# Patient Record
Sex: FEMALE | Race: WHITE | Employment: OTHER | URBAN - METROPOLITAN AREA
[De-identification: names, ages, dates, MRNs, and addresses within clinical notes are randomized per-mention and may not be internally consistent; named-entity substitution may affect disease eponyms.]

---

## 2018-07-05 ENCOUNTER — HOSPITAL ENCOUNTER (OUTPATIENT)
Dept: PHYSICAL THERAPY | Age: 73
Discharge: HOME | End: 2018-07-05
Payer: MEDICARE

## 2018-07-05 ENCOUNTER — HOSPITAL ENCOUNTER (OUTPATIENT)
Dept: BONE DENSITY | Age: 73
Discharge: HOME | End: 2018-07-05
Attending: FAMILY MEDICINE
Payer: MEDICARE

## 2018-07-05 DIAGNOSIS — M85.80 OSTEOPENIA: ICD-10-CM

## 2018-07-05 DIAGNOSIS — M81.0 OSTEOPOROSIS: ICD-10-CM

## 2018-07-05 PROCEDURE — 77080 DXA BONE DENSITY AXIAL: CPT

## 2018-07-05 PROCEDURE — G8979 MOBILITY GOAL STATUS: HCPCS

## 2018-07-05 PROCEDURE — 97162 PT EVAL MOD COMPLEX 30 MIN: CPT

## 2018-07-05 PROCEDURE — G8978 MOBILITY CURRENT STATUS: HCPCS

## 2018-07-05 PROCEDURE — 97110 THERAPEUTIC EXERCISES: CPT

## 2018-07-05 NOTE — PROGRESS NOTES
PT DAILY TREATMENT NOTE - [facility] 3-16    Patient Name: Emmett Gómez  Date:2018  : 1945  [x]  Patient  Verified  Payor: [place] MEDICARE / Plan: [place] MEDICARE PART A & B / Product Type: Medicare /    In time:4:00  Out time:4:40  Total Treatment Time (min): 40  Total Timed Codes (min): 10  1:1 Treatment Time ( [address] only): 40   Visit #: 1 of 10    Treatment Area: Pain in right hip [M25.551]    SUBJECTIVE  Pain Level (0-10 scale): 0/10  Any medication changes, allergies to medications, adverse drug reactions, diagnosis change, or new procedure performed?: [x] No    [] Yes (see summary sheet for update)  Subjective functional status/changes:   [x] See paper initial evaluation form in patient chart      OBJECTIVE    30 min [x]Eval                  []Re-Eval     10 min Therapeutic Exercise:  [] See flow sheet : HEP given and reviewed with Pt   Rationale: increase ROM to improve the patients ability to perform ADLs, resume exercise regimen          With   [x] TE   [] TA   [] neuro   [] other: Patient Education: [x] Review HEP    [] Progressed/Changed HEP based on:   [] positioning   [] body mechanics   [] transfers   [] heat/ice application    [] other:      Other Objective/Functional Measures: FOTO 53 pts     Pain Level (0-10 scale) post treatment: 0/10    ASSESSMENT/Changes in Function:     Patient will continue to benefit from skilled PT services to address functional mobility deficits, address ROM deficits, analyze and address soft tissue restrictions, analyze and cue movement patterns, analyze and modify body mechanics/ergonomics and instruct in home and community integration to attain remaining goals.      [x]  See Plan of Care         PLAN  []  Upgrade activities as tolerated     [x]  Continue plan of care  []  Update interventions per flow sheet       []  Discharge due to:_  []  Other:_      Sosa Andre, PT 2018  5:01 PM

## 2018-07-05 NOTE — PROGRESS NOTES
[facility] - [facility], [address] [place] [address]  [phone] fax  Plan of Care/ Statement of Necessity for Physical Therapy Services    Patient name: Clara Talavera Start of Care: 2018   Referral source: Michelle Barriga MD : 1945    Medical Diagnosis: Pain in right hip [M25.551]   Onset Date:3/2018    Treatment Diagnosis: Right Hip Pain   Prior Hospitalization: see medical history Provider#: [id number]   Medications: Verified on Patient summary List    Comorbidities: Arthritis; Asthma; Neck pain; hx of ulcers; Headaches   Prior Level of Function: Lives in high [facility]; functionally independent; exercising 3x/wk at [address] and following information is based on the information from the initial evaluation. Assessment/ key information: Pt is a 67 y.o. female who presents with c/o gradual onset of right glute cramping and intermittent tenderness that persisted and now radiates down into right thigh and calf. Pt uncertain if from low impact exercises Pt began at beginning of the year after joining [employer]. Pt now reports intermittent burning, hot pain and cramping/aching. Pt compliant with stretches and use of cold pack that assists with pain but only temporarily. Pt reports decreased standing/amb tolerance, difficulty with stairs when visiting son's home, cramping at night, disrupting sleep, and inability to exercise regularly now. Upon exam, Pt exhibited point tenderness to right glute medius, piriformis, quad that reproduced Pt's symptoms; and tightness in same muscle groups. Pt would benefit from skilled PT to address above deficits to improve Pt's function and ability to return to prior functional level without difficulty or pain.     Evaluation Complexity History MEDIUM  Complexity : 1-2 comorbidities / personal factors will impact the outcome/ POC ; Examination LOW Complexity : 1-2 Standardized tests and measures addressing body structure, function, activity limitation and / or participation in recreation  ;Presentation MEDIUM Complexity : Evolving with changing characteristics  ; Clinical Decision Making MEDIUM Complexity : FOTO score of 26-74  Overall Complexity Rating: MEDIUM  Problem List: pain affecting function, decrease ROM, impaired gait/ balance, decrease ADL/ functional abilitiies, decrease activity tolerance and decrease flexibility/ joint mobility    Treatment Plan may include any combination of the following: Therapeutic exercise, Therapeutic activities, Neuromuscular re-education, Physical agent/modality, Manual therapy and Patient education  Patient / Family readiness to learn indicated by: asking questions, trying to perform skills and interest  Persons(s) to be included in education: patient (P)  Barriers to Learning/Limitations: None  Patient Goal (s): No pain.   Patient Self Reported Health Status: good  Rehabilitation Potential: good    Short Term Goals: To be accomplished in 1 weeks:  Goal: Pt to be compliant with initial HEP to improve flexibility and reduce cramping. Status at last note/certification: Established and reviewed with Pt  Long Term Goals: To be accomplished in 5 weeks:  Goal: Pt to report no more than 1-2 episodes of cramping per week to be able to sleep throughout the night. Status at last note/certification: cramping daily, multiple times   Goal: Pt to report at least 50% improvement in right hip symptoms to return to exercise regimen. Status at last note/certification: N/A  Goal: Pt to report FOTO score of 63 pts to show improved function. Status at last note/certification: FOTO 53 pts    Frequency / Duration: Patient to be seen 2 times per week for 5 weeks.     Patient/ Caregiver education and instruction: Diagnosis, prognosis, exercises   [x]  Plan of care has been reviewed with PTA    G-Codes (GP)  Mobility   Current  CK= 40-59%   Goal CJ= 20-39%    The severity rating is based on clinical judgment and the FOTO score. Certification Period: 7/5/18 - 8/3/18  Sonali Andre, PT 7/5/2018 5:02 PM  _____________________________________________________________________  I certify that the above Therapy Services are being furnished while the patient is under my care. I agree with the treatment plan and certify that this therapy is necessary.     Physician's Signature:____________________  Date:__________Time:______    Please sign and return to [facility] - [address]  [phone] fax

## 2018-07-10 ENCOUNTER — HOSPITAL ENCOUNTER (OUTPATIENT)
Dept: PHYSICAL THERAPY | Age: 73
Discharge: HOME | End: 2018-07-10
Payer: MEDICARE

## 2018-07-10 PROCEDURE — 97014 ELECTRIC STIMULATION THERAPY: CPT

## 2018-07-10 PROCEDURE — 97110 THERAPEUTIC EXERCISES: CPT

## 2018-07-10 PROCEDURE — 97112 NEUROMUSCULAR REEDUCATION: CPT

## 2018-07-10 PROCEDURE — 97140 MANUAL THERAPY 1/> REGIONS: CPT

## 2018-07-10 NOTE — PROGRESS NOTES
PT DAILY TREATMENT NOTE - [facility]     Patient Name: Liliya Reading  Date:7/10/2018  : 1945  [x]  Patient  Verified  Payor: VA MEDICARE / Plan: VA MEDICARE PART A & B / Product Type: Medicare /    In time:8:30  Out time:9:20  Total Treatment Time (min): 50  Total Timed Codes (min): 40  1:1 Treatment Time ( [address] only): 40   Visit #: 2 of 10    Treatment Area: Pain in right hip [M25.551]    SUBJECTIVE  Pain Level (0-10 scale): 3-4/10  Any medication changes, allergies to medications, adverse drug reactions, diagnosis change, or new procedure performed?: [x] No    [] Yes (see summary sheet for update)  Subjective functional status/changes:   [] No changes reported  Pt reports compliance with HEP. OBJECTIVE    Modality rationale: decrease pain to improve the patients ability to perform ADls with less difficulty. Min Type Additional Details   10 [x] Estim:  [x]Unatt       [x]IFC  []Premod                        []Other:  []w/ice   []w/heat  Position:left S/L  Location:right glutes    [] Estim: []Att    []TENS instruct  []NMES                    []Other:  []w/US   []w/ice   []w/heat  Position:  Location:    []  Traction: [] Cervical       []Lumbar                       [] Prone          []Supine                       []Intermittent   []Continuous Lbs:  [] before manual  [] after manual    []  Ultrasound: []Continuous   [] Pulsed                           []1MHz   []3MHz W/cm2:  Location:    []  Iontophoresis with dexamethasone         Location: [] Take home patch   [] In clinic    []  Ice     []  heat  []  Ice massage  []  Laser   []  Anodyne Position:  Location:    []  Laser with stim  []  Other:  Position:  Location:    []  Vasopneumatic Device Pressure:       [] lo [] med [] hi   Temperature: [] lo [] med [] hi   [x] Skin assessment post-treatment:  [x]intact [x]redness- no adverse reaction    []redness - adverse reaction:        30 min Neuromuscular Re-education:  []  See flow sheet :pilates ex. Dynamic warm up. Rationale: increase ROM, increase strength, improve coordination and improve balance  to improve the patients ability to perform ADls with less difficulty. 10 min Manual Therapy:  TPR, STM to right piriformis, ITB and quads in left S/L. Rationale: decrease pain, increase ROM, increase tissue extensibility and decrease trigger points to perform ADLs with less difficulty. With   [] TE   [] TA   [] neuro   [] other: Patient Education: [x] Review HEP    [] Progressed/Changed HEP based on:   [] positioning   [] body mechanics   [] transfers   [] heat/ice application    [] other:      Other Objective/Functional Measures:      Pain Level (0-10 scale) post treatment: 3/10    ASSESSMENT/Changes in Function: Initiated ex. Per flow sheet. Pt reported increased pain with pilate's ex. Feet in straps and declined circles. Will progress next visit per pt. Tolerance. Pt reported decreased pain and muscle tension after manual and e-stim. Patient will continue to benefit from skilled PT services to modify and progress therapeutic interventions, address functional mobility deficits, address ROM deficits, address strength deficits, analyze and address soft tissue restrictions and analyze and cue movement patterns to attain remaining goals. []  See Plan of Care  []  See progress note/recertification  []  See Discharge Summary         Progress towards goals / Updated goals:  Short Term Goals: To be accomplished in 1 weeks:  Goal: Pt to be compliant with initial HEP to improve flexibility and reduce cramping. Status at last note/certification: Established and reviewed with Pt  Current: MET. Pt reports compliance. Long Term Goals: To be accomplished in 5 weeks:  Goal: Pt to report no more than 1-2 episodes of cramping per week to be able to sleep throughout the night.   Status at last note/certification: cramping daily, multiple times   Goal: Pt to report at least 50% improvement in right hip symptoms to return to exercise regimen. Status at last note/certification: N/A  Goal: Pt to report FOTO score of 63 pts to show improved function.   Status at last note/certification: FOTO 53 pts    PLAN  [x]  Upgrade activities as tolerated     [x]  Continue plan of care  []  Update interventions per flow sheet       []  Discharge due to:_  []  Other:_      Suzanne Cunningham PTA 7/10/2018  8:28 AM    Future Appointments  Date Time Provider Diana Burton   7/10/2018 8:30 AM Suzanne Cunningham PTA [facility]   7/12/2018 8:30 AM Carletha Garrison [facility]   7/17/2018 9:30  [address] [facility]   7/19/2018 9:30 AM Pal Perez, PT [facility]   7/24/2018 9:30 AM Pal Perez, PT [facility]   7/26/2018 10:30 AM Carletha Garrison [facility]   7/31/2018 9:30 AM Pal Perez, PT Jagruti Fernandez

## 2018-07-12 ENCOUNTER — HOSPITAL ENCOUNTER (OUTPATIENT)
Dept: PHYSICAL THERAPY | Age: 73
Discharge: HOME | End: 2018-07-12
Payer: MEDICARE

## 2018-07-12 PROCEDURE — 97112 NEUROMUSCULAR REEDUCATION: CPT

## 2018-07-12 PROCEDURE — 97014 ELECTRIC STIMULATION THERAPY: CPT

## 2018-07-12 PROCEDURE — 97140 MANUAL THERAPY 1/> REGIONS: CPT

## 2018-07-12 NOTE — PROGRESS NOTES
PT DAILY TREATMENT NOTE - [facility]     Patient Name: Carlitos Ordaz  Date:2018  : 1945  [x]  Patient  Verified  Payor: VA MEDICARE / Plan: VA MEDICARE PART A & B / Product Type: Medicare /    In time:8:30  Out time:9;30  Total Treatment Time (min): 60  Total Timed Codes (min): 45  1:1 Treatment Time ( [address] only): 39   Visit #: 3 of 10    Treatment Area: Pain in right hip [M25.551]    SUBJECTIVE  Pain Level (0-10 scale): 3-4  Any medication changes, allergies to medications, adverse drug reactions, diagnosis change, or new procedure performed?: [x] No    [] Yes (see summary sheet for update)  Subjective functional status/changes:   [] No changes reported  I felt loser after last session. And I didn't wake up with spasms last night, just sore.     OBJECTIVE    Modality rationale: decrease pain to improve the patients ability to improve activity tolerance   Min Type Additional Details   15 [x] Estim:  [x]Unatt       [x]IFC  []Premod                        []Other:  []w/ice   []w/heat  Position: left SL  Location: right hip    [] Estim: []Att    []TENS instruct  []NMES                    []Other:  []w/US   []w/ice   []w/heat  Position:  Location:    []  Traction: [] Cervical       []Lumbar                       [] Prone          []Supine                       []Intermittent   []Continuous Lbs:  [] before manual  [] after manual    []  Ultrasound: []Continuous   [] Pulsed                           []1MHz   []3MHz W/cm2:  Location:    []  Iontophoresis with dexamethasone         Location: [] Take home patch   [] In clinic    []  Ice     []  heat  []  Ice massage  []  Laser   []  Anodyne Position:  Location:    []  Laser with stim  []  Other:  Position:  Location:    []  Vasopneumatic Device Pressure:       [] lo [] med [] hi   Temperature: [] lo [] med [] hi   [x] Skin assessment post-treatment:  [x]intact []redness- no adverse reaction    []redness - adverse reaction:          30 min Neuromuscular Re-education:  []  See flow sheet :   Rationale: increase strength and improve coordination  to improve the patients ability to increase hip stability for ease of function    15 min Manual Therapy:  STM to Right glutes, piriformis, psoas tendon for hip flexion   Rationale: decrease pain, increase ROM and increase tissue extensibility to improve ease of gait, resume workouts. With   [] TE   [] TA   [] neuro   [] other: Patient Education: [x] Review HEP    [] Progressed/Changed HEP based on:   [] positioning   [] body mechanics   [] transfers   [] heat/ice application    [] other:      Other Objective/Functional Measures:      Pain Level (0-10 scale) post treatment:  3    ASSESSMENT/Changes in Function: tender along right hip quadrant, hip flexors and distal ITB. Pt demo minimal hip./trunkdisociationwithgait, but heel toe pattern. Will address gait fluidity next session     Patient will continue to benefit from skilled PT services to modify and progress therapeutic interventions, address functional mobility deficits, address ROM deficits, address strength deficits, analyze and address soft tissue restrictions, analyze and cue movement patterns, analyze and modify body mechanics/ergonomics, assess and modify postural abnormalities, address imbalance/dizziness and instruct in home and community integration to attain remaining goals. []  See Plan of Care  []  See progress note/recertification  []  See Discharge Summary         Progress towards goals / Updated goals:  Goal: Pt to be compliant with initial HEP to improve flexibility and reduce cramping. Status at last note/certification: Established and reviewed with Pt  Current: MET. Pt reports compliance. Long Term Goals: To be accomplished in 5 weeks:  Goal: Pt to report no more than 1-2 episodes of cramping per week to be able to sleep throughout the night.   Status at last note/certification: cramping daily, multiple times   Goal: Pt to report at least 50% improvement in right hip symptoms to return to exercise regimen. Status at last note/certification: N/A  Goal: Pt to report FOTO score of 63 pts to show improved function.   Status at last note/certification: FOTO 53 pts    PLAN  [x]  Upgrade activities as tolerated     []  Continue plan of care  []  Update interventions per flow sheet       []  Discharge due to:_  []  Other:_      Lloyd Livingston, PTA 7/12/2018  8:51 AM    Future Appointments  Date Time Provider Diana Burton   7/17/2018 9:30  [address] [facility]   7/19/2018 9:30 AM Claudeen Clay, PT [facility]   7/24/2018 9:30 AM Claudeen Clay, PT [facility]   7/26/2018 10:30  [address] [facility]   7/31/2018 9:30 AM Claudeen Clay, PT Florentin Shay

## 2018-07-17 ENCOUNTER — HOSPITAL ENCOUNTER (OUTPATIENT)
Dept: PHYSICAL THERAPY | Age: 73
Discharge: HOME | End: 2018-07-17
Payer: MEDICARE

## 2018-07-17 PROCEDURE — 97140 MANUAL THERAPY 1/> REGIONS: CPT

## 2018-07-17 PROCEDURE — 97014 ELECTRIC STIMULATION THERAPY: CPT

## 2018-07-17 PROCEDURE — 97112 NEUROMUSCULAR REEDUCATION: CPT

## 2018-07-17 NOTE — PROGRESS NOTES
PT DAILY TREATMENT NOTE - [facility]     Patient Name: Padmini Gutierrez  Date:2018  : 1945  [x]  Patient  Verified  Payor: VA MEDICARE / Plan: VA MEDICARE PART A & B / Product Type: Medicare /    In time: 9;30 Out time:10;30  Total Treatment Time (min): 60  Total Timed Codes (min): 45  1:1 Treatment Time ( [address] only): 39   Visit #: 4 of 10    Treatment Area: Pain in right hip [M25.551]    SUBJECTIVE  Pain Level (0-10 scale): 7  Any medication changes, allergies to medications, adverse drug reactions, diagnosis change, or new procedure performed?: [x] No    [] Yes (see summary sheet for update)  Subjective functional status/changes:   [] No changes reported  I was sore after Thursday and into the weekend, but it got better.     OBJECTIVE    Modality rationale: decrease inflammation, decrease pain and increase tissue extensibility to improve the patients ability to perform daily tasks   Min Type Additional Details   15 [x] Estim:  [x]Unatt       [x]IFC  []Premod                        []Other:  [x]w/ice   []w/heat  Position: left SL  Location: Right hip, ITB    [] Estim: []Att    []TENS instruct  []NMES                    []Other:  []w/US   []w/ice   []w/heat  Position:  Location:    []  Traction: [] Cervical       []Lumbar                       [] Prone          []Supine                       []Intermittent   []Continuous Lbs:  [] before manual  [] after manual    []  Ultrasound: []Continuous   [] Pulsed                           []1MHz   []3MHz W/cm2:  Location:    []  Iontophoresis with dexamethasone         Location: [] Take home patch   [] In clinic    []  Ice     []  heat  []  Ice massage  []  Laser   []  Anodyne Position:  Location:    []  Laser with stim  []  Other:  Position:  Location:    []  Vasopneumatic Device Pressure:       [] lo [] med [] hi   Temperature: [] lo [] med [] hi   [x] Skin assessment post-treatment:  [x]intact []redness- no adverse reaction    []redness - adverse reaction: 30 min Neuromuscular Re-education:  []  See flow sheet :   Rationale: increase strength, improve coordination and improve balance  to improve the patients ability to increase hip stability and core for ease of function, resume exercise routine    15 min Manual Therapy:  STM to ITB, piriformis   Rationale: decrease pain, increase ROM and increase tissue extensibility to improve ease of mobility       With   [] TE   [] TA   [] neuro   [] other: Patient Education: [x] Review HEP    [] Progressed/Changed HEP based on:   [] positioning   [] body mechanics   [] transfers   [] heat/ice application    [] other:      Other Objective/Functional Measures:   Emphasis on gait and arm swing for correct sequencing and trunk rotation     Pain Level (0-10 scale) post treatment: 3    ASSESSMENT/Changes in Function: with increased ramiro, pt reporting greater ease of LE advancement and more fluidity in gait . Patient will continue to benefit from skilled PT services to modify and progress therapeutic interventions, address functional mobility deficits, address ROM deficits, address strength deficits, analyze and address soft tissue restrictions, analyze and cue movement patterns, analyze and modify body mechanics/ergonomics, assess and modify postural abnormalities, address imbalance/dizziness and instruct in home and community integration to attain remaining goals. []  See Plan of Care  []  See progress note/recertification  []  See Discharge Summary         Progress towards goals / Updated goals:  Goal: Pt to be compliant with initial HEP to improve flexibility and reduce cramping. Status at last note/certification: Established and reviewed with Pt  Current: MET. Pt reports compliance. Long Term Goals: To be accomplished in 5 weeks:  Goal: Pt to report no more than 1-2 episodes of cramping per week to be able to sleep throughout the night.   Status at last note/certification: cramping daily, multiple times Goal: Pt to report at least 50% improvement in right hip symptoms to return to exercise regimen. Status at last note/certification: N/A  Goal: Pt to report FOTO score of 63 pts to show improved function.   Status at last note/certification: FOTO 53 pts    PLAN  [x]  Upgrade activities as tolerated     []  Continue plan of care  []  Update interventions per flow sheet       []  Discharge due to:_  []  Other:_      Marzetta Sever, PTA 7/17/2018  10:22 AM    Future Appointments  Date Time Provider Diana Burton   7/19/2018 9:30 AM Mukesh Wild, PT [facility]   7/24/2018 9:30 AM Mukesh Wild, PT [facility]   7/26/2018 10:30  [address] [facility]   7/31/2018 9:30 AM Mukesh Wild, PT [facility] [place]

## 2018-07-19 ENCOUNTER — HOSPITAL ENCOUNTER (OUTPATIENT)
Dept: PHYSICAL THERAPY | Age: 73
Discharge: HOME | End: 2018-07-19
Payer: MEDICARE

## 2018-07-19 PROCEDURE — 97140 MANUAL THERAPY 1/> REGIONS: CPT

## 2018-07-19 PROCEDURE — 97112 NEUROMUSCULAR REEDUCATION: CPT

## 2018-07-19 NOTE — PROGRESS NOTES
PT DAILY TREATMENT NOTE - [facility]     Patient Name: Liliya Reading  Date:2018  : 1945  [x]  Patient  Verified  Payor: [place] MEDICARE / Plan: [place] MEDICARE PART A & B / Product Type: Medicare /    In time:930  Out time:1025  Total Treatment Time (min): 55  Total Timed Codes (min): 40  1:1 Treatment Time ( [address] only): 35   Visit #: 5 of 10    Treatment Area: Pain in right hip [M25.551]    SUBJECTIVE  Pain Level (0-10 scale): 3  Any medication changes, allergies to medications, adverse drug reactions, diagnosis change, or new procedure performed?: [x] No    [] Yes (see summary sheet for update)  Subjective functional status/changes:   [] No changes reported  I haven't had any of the cramping for the past three nights. I did have some pain last night though.      OBJECTIVE    Modality rationale: decrease inflammation and decrease pain to improve the patients ability to improve activity tolerance   Min Type Additional Details   15 [x] Estim:  [x]Unatt       [x]IFC  []Premod                        []Other:  [x]w/ice   []w/heat  Position:sidelying  Location:Right hip    [] Estim: []Att    []TENS instruct  []NMES                    []Other:  []w/US   []w/ice   []w/heat  Position:  Location:    []  Traction: [] Cervical       []Lumbar                       [] Prone          []Supine                       []Intermittent   []Continuous Lbs:  [] before manual  [] after manual    []  Ultrasound: []Continuous   [] Pulsed                           []1MHz   []3MHz W/cm2:  Location:    []  Iontophoresis with dexamethasone         Location: [] Take home patch   [] In clinic    []  Ice     []  heat  []  Ice massage  []  Laser   []  Anodyne Position:  Location:    []  Laser with stim  []  Other:  Position:  Location:    []  Vasopneumatic Device Pressure:       [] lo [] med [] hi   Temperature: [] lo [] med [] hi   [] Skin assessment post-treatment:  []intact []redness- no adverse reaction    []redness - adverse reaction: 28 min Neuromuscular Re-education:  [x]  See flow sheet :   Rationale: increase strength and improve coordination  to improve the patients ability to improve hip stability     12 min Manual Therapy:  STM Right glute med/TFL/ITB   Rationale: decrease pain, increase ROM, increase tissue extensibility and decrease trigger points to improve ease of mobility       With   [] TE   [] TA   [] neuro   [] other: Patient Education: [x] Review HEP    [] Progressed/Changed HEP based on:   [] positioning   [] body mechanics   [] transfers   [] heat/ice application    [] other:      Other Objective/Functional Measures: completed exercises per flow sheet     Pain Level (0-10 scale) post treatment: 0    ASSESSMENT/Changes in Function: Patient reports limited glute cramping over the past few days, but remains TTP over her Right greater trochanter and in the gluteal musculature. Less tenderness following manual therapy. Patient will continue to benefit from skilled PT services to modify and progress therapeutic interventions, address functional mobility deficits, address ROM deficits, address strength deficits, analyze and address soft tissue restrictions, analyze and cue movement patterns, analyze and modify body mechanics/ergonomics, assess and modify postural abnormalities, address imbalance/dizziness and instruct in home and community integration to attain remaining goals. []  See Plan of Care  []  See progress note/recertification  []  See Discharge Summary         Progress towards goals / Updated goals:  Goal: Pt to be compliant with initial HEP to improve flexibility and reduce cramping. Status at last note/certification: Established and reviewed with Pt  Current status: Met  Long Term Goals: To be accomplished in 5 weeks:  Goal: Pt to report no more than 1-2 episodes of cramping per week to be able to sleep throughout the night.   Status at last note/certification: cramping daily, multiple times   Current status: Progressing, less frequent cramping at night  Goal: Pt to report at least 50% improvement in right hip symptoms to return to exercise regimen. Status at last note/certification: N/A  Current status: Met  Goal: Pt to report FOTO score of 63 pts to show improved function.   Status at last note/certification: FOTO 53 pts  Current status: Progressing, FOTO 56 pts    PLAN  [x]  Upgrade activities as tolerated     [x]  Continue plan of care  []  Update interventions per flow sheet       []  Discharge due to:_  []  Other:_      Checo Jeter, PT 7/19/2018  8:11 AM    Future Appointments  Date Time Provider Diana Burton   7/19/2018 9:30 AM Checo Jeter, PT [facility]   7/24/2018 9:30 AM Checo Jeter, JOHAN [facility]   7/26/2018 10:30  [address] [facility]   7/31/2018 9:30 AM Checo Jeter, JOHAN [facility]

## 2018-07-24 ENCOUNTER — HOSPITAL ENCOUNTER (OUTPATIENT)
Dept: PHYSICAL THERAPY | Age: 73
Discharge: HOME | End: 2018-07-24
Payer: MEDICARE

## 2018-07-24 PROCEDURE — 97110 THERAPEUTIC EXERCISES: CPT

## 2018-07-24 PROCEDURE — 97014 ELECTRIC STIMULATION THERAPY: CPT

## 2018-07-24 PROCEDURE — 97140 MANUAL THERAPY 1/> REGIONS: CPT

## 2018-07-24 NOTE — PROGRESS NOTES
PT DAILY TREATMENT NOTE - [facility]     Patient Name: Clara Talavera  Date:2018  : 1945  [x]  Patient  Verified  Payor: [place] MEDICARE / Plan: VA MEDICARE PART A & B / Product Type: Medicare /    In time:930  Out time:1010  Total Treatment Time (min): 40  Total Timed Codes (min): 25  1:1 Treatment Time ( [address] only): 25   Visit #: 6 of 10    Treatment Area: Pain in right hip [M25.551]    SUBJECTIVE  Pain Level (0-10 scale): 2-3  Any medication changes, allergies to medications, adverse drug reactions, diagnosis change, or new procedure performed?: [x] No    [] Yes (see summary sheet for update)  Subjective functional status/changes:   [] No changes reported  Unrelated to the hip my stomach isn't feeling great.      OBJECTIVE    Modality rationale: decrease inflammation and decrease pain to improve the patients ability to improve activity tolerance   Min Type Additional Details   15 [x] Estim:  [x]Unatt       [x]IFC  []Premod                        []Other:  [x]w/ice   []w/heat  Position:sidelying Left  Location:Right hip    [] Estim: []Att    []TENS instruct  []NMES                    []Other:  []w/US   []w/ice   []w/heat  Position:  Location:    []  Traction: [] Cervical       []Lumbar                       [] Prone          []Supine                       []Intermittent   []Continuous Lbs:  [] before manual  [] after manual    []  Ultrasound: []Continuous   [] Pulsed                           []1MHz   []3MHz W/cm2:  Location:    []  Iontophoresis with dexamethasone         Location: [] Take home patch   [] In clinic    []  Ice     []  heat  []  Ice massage  []  Laser   []  Anodyne Position:  Location:    []  Laser with stim  []  Other:  Position:  Location:    []  Vasopneumatic Device Pressure:       [] lo [] med [] hi   Temperature: [] lo [] med [] hi   [] Skin assessment post-treatment:  []intact []redness- no adverse reaction    []redness - adverse reaction:     13 min Therapeutic Exercise:  [x] See flow sheet :   Rationale: increase ROM and increase strength to improve the patients ability to improve ease of mobility    12 min Manual Therapy:  STM Right glute med/TFL/ITB/glute max/distal vastus lateralis    Rationale: decrease pain, increase ROM, increase tissue extensibility and decrease trigger points to improve ease of mobility         With   [] TE   [] TA   [] neuro   [] other: Patient Education: [x] Review HEP    [] Progressed/Changed HEP based on:   [] positioning   [] body mechanics   [] transfers   [] heat/ice application    [] other:      Other Objective/Functional Measures: performed static stretches; updated HEP     Pain Level (0-10 scale) post treatment: 0    ASSESSMENT/Changes in Function: Due to stomach discomfort, Patient requested more static, gentle stretching today. Updated HEP to include gentle gluteal and quad strengthening, due to tenderness in the distal vastus lateralis and gluteal musculature. Patient will continue to benefit from skilled PT services to modify and progress therapeutic interventions, address functional mobility deficits, address ROM deficits, address strength deficits, analyze and address soft tissue restrictions, analyze and cue movement patterns, analyze and modify body mechanics/ergonomics, assess and modify postural abnormalities, address imbalance/dizziness and instruct in home and community integration to attain remaining goals. []  See Plan of Care  []  See progress note/recertification  []  See Discharge Summary         Progress towards goals / Updated goals:  Goal: Pt to be compliant with initial HEP to improve flexibility and reduce cramping. Status at last note/certification: Established and reviewed with Pt  Current status: Met  Long Term Goals: To be accomplished in 5 weeks:  Goal: Pt to report no more than 1-2 episodes of cramping per week to be able to sleep throughout the night.   Status at last note/certification: cramping daily, multiple times Current status: Progressing, less frequent cramping at night  Goal: Pt to report at least 50% improvement in right hip symptoms to return to exercise regimen. Status at last note/certification: N/A  Current status: Met  Goal: Pt to report FOTO score of 63 pts to show improved function.   Status at last note/certification: FOTO 53 pts  Current status: Progressing, FOTO 56 pts    PLAN  []  Upgrade activities as tolerated     [x]  Continue plan of care  []  Update interventions per flow sheet       []  Discharge due to:_  []  Other:_      Teresita Zhou, SPT   Britney Acosta, PT 7/24/2018  9:37 AM    Future Appointments  Date Time Provider Diana Burton   7/26/2018 10:30  [address] [facility]   7/31/2018 9:30 AM Britney Acosta, PT [facility]

## 2018-07-26 ENCOUNTER — HOSPITAL ENCOUNTER (OUTPATIENT)
Dept: PHYSICAL THERAPY | Age: 73
Discharge: HOME | End: 2018-07-26
Payer: MEDICARE

## 2018-07-26 PROCEDURE — 97140 MANUAL THERAPY 1/> REGIONS: CPT

## 2018-07-26 PROCEDURE — 97014 ELECTRIC STIMULATION THERAPY: CPT

## 2018-07-26 PROCEDURE — 97110 THERAPEUTIC EXERCISES: CPT

## 2018-07-26 NOTE — PROGRESS NOTES
PT DAILY TREATMENT NOTE - [facility]     Patient Name: Vanessa Astudillo  Date:2018  : 1945  [x]  Patient  Verified  Payor: VA MEDICARE / Plan: VA MEDICARE PART A & B / Product Type: Medicare /    In time:10;30  Out time:11;30  Total Treatment Time (min): 60  Total Timed Codes (min): 45  1:1 Treatment Time ( [address] only): 39   Visit #: 7 of 10    Treatment Area: Pain in right hip [M25.551]    SUBJECTIVE  Pain Level (0-10 scale): 2-3  Any medication changes, allergies to medications, adverse drug reactions, diagnosis change, or new procedure performed?: [x] No    [] Yes (see summary sheet for update)  Subjective functional status/changes:   [] No changes reported  Feel better today    OBJECTIVE    Modality rationale: decrease pain to improve the patients ability to improve activity tolerance   Min Type Additional Details   15 [x] Estim:  [x]Unatt       []IFC  []Premod                        [x]Other:  [x]w/ice   []w/heat  Position: left SL  Location: right hip    [] Estim: []Att    []TENS instruct  []NMES                    []Other:  []w/US   []w/ice   []w/heat  Position:  Location:    []  Traction: [] Cervical       []Lumbar                       [] Prone          []Supine                       []Intermittent   []Continuous Lbs:  [] before manual  [] after manual    []  Ultrasound: []Continuous   [] Pulsed                           []1MHz   []3MHz W/cm2:  Location:    []  Iontophoresis with dexamethasone         Location: [] Take home patch   [] In clinic    []  Ice     []  heat  []  Ice massage  []  Laser   []  Anodyne Position:  Location:    []  Laser with stim  []  Other:  Position:  Location:    []  Vasopneumatic Device Pressure:       [] lo [] med [] hi   Temperature: [] lo [] med [] hi   [x] Skin assessment post-treatment:  [x]intact []redness- no adverse reaction    []redness - adverse reaction:         35 min Therapeutic Exercise:  [] See flow sheet :   Rationale: increase ROM and increase strength to improve the patients ability to improve ease of mobility    10 min Manual Therapy:  STM to right ITB and piriformis   Rationale: decrease pain, increase ROM and increase tissue extensibility to improve ease of gait      With   [] TE   [] TA   [] neuro   [] other: Patient Education: [x] Review HEP    [] Progressed/Changed HEP based on:   [] positioning   [] body mechanics   [] transfers   [] heat/ice application    [] other:      Other Objective/Functional Measures:      Pain Level (0-10 scale) post treatment:  2    ASSESSMENT/Changes in Function: decreased tenderness in ITB and adductors today    Patient will continue to benefit from skilled PT services to modify and progress therapeutic interventions, address functional mobility deficits, address ROM deficits, address strength deficits, analyze and address soft tissue restrictions, analyze and cue movement patterns, analyze and modify body mechanics/ergonomics, assess and modify postural abnormalities, address imbalance/dizziness and instruct in home and community integration to attain remaining goals. []  See Plan of Care  []  See progress note/recertification  []  See Discharge Summary         Progress towards goals / Updated goals:  Goal: Pt to be compliant with initial HEP to improve flexibility and reduce cramping. Status at last note/certification: Established and reviewed with Pt  Current status: Met  Long Term Goals: To be accomplished in 5 weeks:  Goal: Pt to report no more than 1-2 episodes of cramping per week to be able to sleep throughout the night. Status at last note/certification: cramping daily, multiple times   Current status: Progressing, less frequent cramping at night  Goal: Pt to report at least 50% improvement in right hip symptoms to return to exercise regimen. Status at last note/certification: N/A  Current status: Met  Goal: Pt to report FOTO score of 63 pts to show improved function.   Status at last note/certification: FOTO 53 pts  Current status: Progressing, FOTO 56 pts    PLAN  [x]  Upgrade activities as tolerated     []  Continue plan of care  []  Update interventions per flow sheet       []  Discharge due to:_  []  Other:_      Shauna Campos, TEE 7/26/2018  4:01 PM    Future Appointments  Date Time Provider Diana Burton   7/31/2018 9:30 AM Felicia Garcia, PT [facility]

## 2018-07-31 ENCOUNTER — HOSPITAL ENCOUNTER (OUTPATIENT)
Dept: PHYSICAL THERAPY | Age: 73
Discharge: HOME | End: 2018-07-31
Payer: MEDICARE

## 2018-07-31 PROCEDURE — 97014 ELECTRIC STIMULATION THERAPY: CPT

## 2018-07-31 PROCEDURE — 97112 NEUROMUSCULAR REEDUCATION: CPT

## 2018-07-31 PROCEDURE — 97140 MANUAL THERAPY 1/> REGIONS: CPT

## 2018-07-31 NOTE — PROGRESS NOTES
PT DAILY TREATMENT NOTE - [facility]     Patient Name: Man Mims  Date:2018  : 1945  [x]  Patient  Verified  Payor: VA MEDICARE / Plan: VA MEDICARE PART A & B / Product Type: Medicare /    In time:927  Out time:1020  Total Treatment Time (min): 53  Total Timed Codes (min): 38  1:1 Treatment Time ( only): 45   Visit #: 8 of 10    Treatment Area: Pain in right hip [M25.551]    SUBJECTIVE  Pain Level (0-10 scale): 2-3  Any medication changes, allergies to medications, adverse drug reactions, diagnosis change, or new procedure performed?: [x] No    [] Yes (see summary sheet for update)  Subjective functional status/changes:   [] No changes reported  My pain just always seems to be in different places    OBJECTIVE    Modality rationale: decrease pain and increase tissue extensibility to improve the patients ability to improve activity tolerance   Min Type Additional Details   15 [x] Estim:  [x]Unatt       [x]IFC  []Premod                        []Other:  [x]w/ice   []w/heat  Position: sidelying  Location: Right hip    [] Estim: []Att    []TENS instruct  []NMES                    []Other:  []w/US   []w/ice   []w/heat  Position:  Location:    []  Traction: [] Cervical       []Lumbar                       [] Prone          []Supine                       []Intermittent   []Continuous Lbs:  [] before manual  [] after manual    []  Ultrasound: []Continuous   [] Pulsed                           []1MHz   []3MHz W/cm2:  Location:    []  Iontophoresis with dexamethasone         Location: [] Take home patch   [] In clinic    []  Ice     []  heat  []  Ice massage  []  Laser   []  Anodyne Position:  Location:    []  Laser with stim  []  Other:  Position:  Location:    []  Vasopneumatic Device Pressure:       [] lo [] med [] hi   Temperature: [] lo [] med [] hi   [] Skin assessment post-treatment:  []intact []redness- no adverse reaction    []redness - adverse reaction:     28 min Neuromuscular Re-education: [x]  See flow sheet :   Rationale: increase ROM and increase strength  to improve the patients ability to improve gluteal activation    10 min Manual Therapy:  STM Right distal gluteus messi, gluteus minimus/medius, vastus lateralis    Rationale: decrease pain, increase ROM, increase tissue extensibility and decrease trigger points to improve ambulation tolerance     With   [] TE   [] TA   [] neuro   [] other: Patient Education: [x] Review HEP    [] Progressed/Changed HEP based on:   [] positioning   [] body mechanics   [] transfers   [] heat/ice application    [] other:      Other Objective/Functional Measures: added bike warmup, clams x 3 with band, chair standing leg pump     Pain Level (0-10 scale) post treatment: 2-3    ASSESSMENT/Changes in Function: Since starting therapy Patient reports reduced buttock cramping, but states that she has become more aware of soreness in her anterior Right hip/thigh. Was previously walking 3 miles but currently unable to walk more than a mile due to increasing Right hip pain. Adjusted exercise routine to incorporate more gluteal strengthening. Patient will continue to benefit from skilled PT services to modify and progress therapeutic interventions, address functional mobility deficits, address ROM deficits, address strength deficits, analyze and address soft tissue restrictions, analyze and cue movement patterns, analyze and modify body mechanics/ergonomics, assess and modify postural abnormalities, address imbalance/dizziness and instruct in home and community integration to attain remaining goals. []  See Plan of Care  []  See progress note/recertification  []  See Discharge Summary         Progress towards goals / Updated goals:  Goal: Pt to be compliant with initial HEP to improve flexibility and reduce cramping.   Status at last note/certification: Established and reviewed with Pt  Current status: Met  Long Term Goals: To be accomplished in 5 weeks:  Goal: Pt to report no more than 1-2 episodes of cramping per week to be able to sleep throughout the night. Status at last note/certification: cramping daily, multiple times   Current status: Progressing, less frequent cramping at night  Goal: Pt to report at least 50% improvement in right hip symptoms to return to exercise regimen. Status at last note/certification: N/A  Current status: Met  Goal: Pt to report FOTO score of 63 pts to show improved function.   Status at last note/certification: FOTO 53 pts  Current status: Progressing, FOTO 56 pts    PLAN  [x]  Upgrade activities as tolerated     [x]  Continue plan of care  []  Update interventions per flow sheet       []  Discharge due to:_  []  Other:_      Julio César Marshall, SPT  Chaim Umaña, PT 7/31/2018  7:07 AM    Future Appointments  Date Time Provider Diana Burton   7/31/2018 9:30 AM Chaim Umaña, PT [facility]

## 2018-08-02 ENCOUNTER — HOSPITAL ENCOUNTER (OUTPATIENT)
Dept: PHYSICAL THERAPY | Age: 73
Discharge: HOME | End: 2018-08-02
Payer: MEDICARE

## 2018-08-02 PROCEDURE — 97110 THERAPEUTIC EXERCISES: CPT

## 2018-08-02 PROCEDURE — 97014 ELECTRIC STIMULATION THERAPY: CPT

## 2018-08-02 PROCEDURE — 97112 NEUROMUSCULAR REEDUCATION: CPT

## 2018-08-02 PROCEDURE — G8979 MOBILITY GOAL STATUS: HCPCS

## 2018-08-02 PROCEDURE — 97140 MANUAL THERAPY 1/> REGIONS: CPT

## 2018-08-02 PROCEDURE — G8978 MOBILITY CURRENT STATUS: HCPCS

## 2018-08-02 NOTE — PROGRESS NOTES
PT DAILY TREATMENT NOTE - [facility]     Patient Name: Seldon Lanes  Date:2018  : 1945  [x]  Patient  Verified  Payor: Brittani Charles / Plan: [place] MEDICARE PART A & B / Product Type: Medicare /    In time:1000  Out time:1101  Total Treatment Time (min): 61  Total Timed Codes (min): 46  1:1 Treatment Time ( [address] only): 55   Visit #: 9 of 10    Treatment Area: Pain in right hip [M25.551]    SUBJECTIVE  Pain Level (0-10 scale): 5  Any medication changes, allergies to medications, adverse drug reactions, diagnosis change, or new procedure performed?: [x] No    [] Yes (see summary sheet for update)  Subjective functional status/changes:   [] No changes reported  It feels a little bit better this morning. I didn't sleep much last night.      OBJECTIVE    Modality rationale: decrease pain to improve the patients ability to improve activity tolerance   Min Type Additional Details   15 [x] Estim:  [x]Unatt       [x]IFC  []Premod                        []Other:  [x]w/ice   []w/heat  Position: side lying  Location: Right hip    [] Estim: []Att    []TENS instruct  []NMES                    []Other:  []w/US   []w/ice   []w/heat  Position:  Location:    []  Traction: [] Cervical       []Lumbar                       [] Prone          []Supine                       []Intermittent   []Continuous Lbs:  [] before manual  [] after manual    []  Ultrasound: []Continuous   [] Pulsed                           []1MHz   []3MHz W/cm2:  Location:    []  Iontophoresis with dexamethasone         Location: [] Take home patch   [] In clinic    []  Ice     []  heat  []  Ice massage  []  Laser   []  Anodyne Position:  Location:    []  Laser with stim  []  Other:  Position:  Location:    []  Vasopneumatic Device Pressure:       [] lo [] med [] hi   Temperature: [] lo [] med [] hi   [] Skin assessment post-treatment:  []intact []redness- no adverse reaction    -redness - adverse reaction:     8 min Therapeutic Exercise:  [x] See flow sheet :   Rationale: increase ROM and increase strength to improve the patients ability to improve hip mobility    28 min Neuromuscular Re-education:  [x]  See flow sheet :   Rationale: increase strength, improve coordination and increase proprioception  to improve the patients ability to improve gluteal activation    10 min Manual Therapy:  STM Right gluteal musculature    Rationale: decrease pain, increase ROM, increase tissue extensibility and decrease trigger points to improve ambulation tolerance           With   [] TE   [] TA   [] neuro   [] other: Patient Education: [x] Review HEP    [] Progressed/Changed HEP based on:   [] positioning   [] body mechanics   [] transfers   [] heat/ice application    [] other:      Other Objective/Functional Measures: Added trapeze assisted squats     Pain Level (0-10 scale) post treatment: 0    ASSESSMENT/Changes in Function: Patient reports increase gluteal pain, but decreased spasms. Demonstrated decent gluteal activation with assisted squats and clams. Reports decreased pain following session. Patient will continue to benefit from skilled PT services to modify and progress therapeutic interventions, address functional mobility deficits, address ROM deficits, address strength deficits, analyze and address soft tissue restrictions, analyze and cue movement patterns, analyze and modify body mechanics/ergonomics, assess and modify postural abnormalities, address imbalance/dizziness and instruct in home and community integration to attain remaining goals. []  See Plan of Care  [x]  See progress note/recertification  []  See Discharge Summary         Progress towards goals / Updated goals:  Goal: Pt to be compliant with initial HEP to improve flexibility and reduce cramping.   Status at last note/certification: Established and reviewed with Pt  Current status: Met  Long Term Goals: To be accomplished in 5 weeks:  Goal: Pt to report no more than 1-2 episodes of cramping per week to be able to sleep throughout the night. Status at last note/certification: cramping daily, multiple times   Current status: Met  Goal: Pt to report at least 50% improvement in right hip symptoms to return to exercise regimen. Status at last note/certification: N/A  Current status: Met, 75%  Goal: Pt to report FOTO score of 63 pts to show improved function.   Status at last note/certification: FOTO 53 pts  Current status: Progressing, FOTO 53 pts    PLAN  [x]  Upgrade activities as tolerated     [x]  Continue plan of care  []  Update interventions per flow sheet       []  Discharge due to:_  []  Other:_      Shannon Viveros, SPT  Pal Perez, PT 8/2/2018  9:56 AM    Future Appointments  Date Time Provider Diana Burton   8/2/2018 10:00 AM Pal Perez, PT [facility]

## 2018-08-02 NOTE — PROGRESS NOTES
[facility] - [facility], [address] [place] [address]  [phone] fax    Continued Plan of Care/ Re-certification for Physical Therapy Services      Patient name: Clara Talavera Start of Care: 2018   Referral source: Michelle Barriga MD : 1945                         Medical Diagnosis: Pain in right hip [M25.551] Onset Date:3/2018                         Treatment Diagnosis: Right Hip Pain   Prior Hospitalization: see medical history Provider#: [id number]   Medications: Verified on Patient summary List    Comorbidities: Arthritis; Asthma; Neck pain; hx of ulcers; Headaches   Prior Level of Function: Lives in high [facility]; functionally independent; exercising 3x/wk at [employer]    Visits from Start of Care: 9    Missed Visits: 0    The Plan of Care and following information is based on the patient's current status:  Goal: Pt to be compliant with initial HEP to improve flexibility and reduce cramping. Status at last note/certification: Established and reviewed with Pt  Current status: Met  Long Term Goals: To be accomplished in 5 weeks:  Goal: Pt to report no more than 1-2 episodes of cramping per week to be able to sleep throughout the night. Status at last note/certification: cramping daily, multiple times   Current status: Met  Goal: Pt to report at least 50% improvement in right hip symptoms to return to exercise regimen. Status at last note/certification: N/A  Current status: Met, 75%  Goal: Pt to report FOTO score of 63 pts to show improved function.   Status at last note/certification: FOTO 53 pts  Current status: Progressing, FOTO 53 pts    Key functional changes:   Functional Gains: less pain in the distal lateral Right thigh, no cramping, less \"piriformis\" pain, improved ease of mobility  Functional Deficits: Right gluteal/buttock pain limiting sleep, less deep pain but more bone pain, unable to walk more than a mile, tailbone pain, inflammation pain   % improvement: 75%  Pain   Average: 4-5/10       Best: 0/10     Worst: 7/10  Patient Goal: \"To be able to walk three miles\"      Problems/ barriers to goal attainment: none     Problem List: pain affecting function, decrease ROM, decrease strength, edema affecting function, impaired gait/ balance, decrease ADL/ functional abilitiies, decrease activity tolerance, decrease flexibility/ joint mobility and decrease transfer abilities    Treatment Plan: Therapeutic exercise, Therapeutic activities, Neuromuscular re-education, Physical agent/modality, Gait/balance training, Manual therapy, Aquatic therapy, Patient education, Self Care training, Functional mobility training, Home safety training and Stair training       Goals for this certification period to be accomplished in 10 treatments:  Goal: Patient will improve FOTO score to 63 pts to show improved function. Status at last note/certification: FOTO 53 pts  Goal: Patient will report increased walking tolerance of 2 miles without increase in Right hip pain in order to progress toward personal goals  Status at last note/certification: 1 mile with pain  Goal: Patient will report ability to ascend/descend a flight of stairs with reciprocal step pattern without increase in pain in order to more easily ambulate within the community  Status at last note/certification: pain with ascend/descending stairs  Goal: Patient will report worst Right hip pain as 4/10 or less in order to improve ease of daily tasks. Status at last note/certification: 0/08    Frequency / Duration: Patient to be seen 2 times per week for 10 treatments:    Assessment / Recommendations:Patient has been consistently attending therapy for Right hip pain. During this time she has had a significant decrease in muscle spasms and increase in hip mobility. Patient continues to be limited with ambulating longer distances and stair negotiation.  For these reasons, Patient remains appropriate for therapy. G-Codes (GP)  Mobility  [id number] Current  CK= 40-59%  [id number] Goal  CJ= 20-39%    The severity rating is based on clinical judgment and the FOTO score. Certification Period: 8/2/18 to 8/31/18    GABBY Dougherty  Chrisandra Phalen, PT 8/2/2018 9:57 AM    ________________________________________________________________________  I certify that the above Therapy Services are being furnished while the patient is under my care. I agree with the treatment plan and certify that this therapy is necessary. [] I have read the above and request that my patient continue as recommended.   [] I have read the above report and request that my patient continue therapy with the following changes/special instructions: ______________________________________  [] I have read the above report and request that my patient be discharged from therapy    Physician's Signature:_______________________________Date:___________Time:__________  Please sign and return to [facility] - [address]  [phone] fax

## 2018-08-07 ENCOUNTER — HOSPITAL ENCOUNTER (OUTPATIENT)
Dept: PHYSICAL THERAPY | Age: 73
Discharge: HOME | End: 2018-08-07
Payer: MEDICARE

## 2018-08-07 PROCEDURE — 97112 NEUROMUSCULAR REEDUCATION: CPT

## 2018-08-07 PROCEDURE — 97035 APP MDLTY 1+ULTRASOUND EA 15: CPT

## 2018-08-07 NOTE — PROGRESS NOTES
PT DAILY TREATMENT NOTE - [facility]     Patient Name: Sahil Hensley  Date:2018  : 1945  [x]  Patient  Verified  Payor: Pham Bertrand / Plan: [place] MED[place] & B / Product Type: Medicare /    In time:8:55  Out time:9:30  Total Treatment Time (min): 35  Total Timed Codes (min): 35  1:1 Treatment Time ( only): 35   Visit #: 1 of 10    Treatment Area: Pain in right hip [M25.551]    SUBJECTIVE  Pain Level (0-10 scale): 3/10  Any medication changes, allergies to medications, adverse drug reactions, diagnosis change, or new procedure performed?: [x] No    [] Yes (see summary sheet for update)  Subjective functional status/changes:   [] No changes reported  \"My brother came by on Thursday, and we did a lot of walking, so by the weekend I was wiped out. \"    OBJECTIVE    Modality rationale: decrease inflammation and decrease pain to improve the patients ability to perform ADls with less difficulty.    Min Type Additional Details    [] Estim:  []Unatt       []IFC  []Premod                        []Other:  []w/ice   []w/heat  Position:  Location:    [] Estim: []Att    []TENS instruct  []NMES                    []Other:  []w/US   []w/ice   []w/heat  Position:  Location:    []  Traction: [] Cervical       []Lumbar                       [] Prone          []Supine                       []Intermittent   []Continuous Lbs:  [] before manual  [] after manual   8 [x]  Ultrasound: []Continuous   [x] Pulsed                           []1MHz   [x]3MHz W/cm2:1.2  Location:right GT    []  Iontophoresis with dexamethasone         Location: [] Take home patch   [] In clinic    []  Ice     []  heat  []  Ice massage  []  Laser   []  Anodyne Position:  Location:    []  Laser with stim  []  Other:  Position:  Location:    []  Vasopneumatic Device Pressure:       [] lo [] med [] hi   Temperature: [] lo [] med [] hi   [x] Skin assessment post-treatment:  [x]intact [x]redness- no adverse reaction    []redness - adverse reaction: 27 min Neuromuscular Re-education:  []  See flow sheet :pilates ex. .   Rationale: increase strength and improve coordination  to improve the patients ability to perform ADls with less difficulty. With   [] TE   [] TA   [] neuro   [] other: Patient Education: [x] Review HEP    [] Progressed/Changed HEP based on:   [] positioning   [] body mechanics   [] transfers   [] heat/ice application    [] other:      Other Objective/Functional Measures: Initiated US to decrease inflammation and pain. Pain Level (0-10 scale) post treatment: 2/10    ASSESSMENT/Changes in Function: Continued with progressed ex. Per flow sheet. Held some ex. To determine if US decreases pain. Patient will continue to benefit from skilled PT services to modify and progress therapeutic interventions, address functional mobility deficits, address ROM deficits and address strength deficits to attain remaining goals. []  See Plan of Care  []  See progress note/recertification  []  See Discharge Summary         Progress towards goals / Updated goals:  Goal: Patient will improve FOTO score to 63 pts to show improved function. Status at last note/certification: FOTO 53 pts  Goal: Patient will report increased walking tolerance of 2 miles without increase in Right hip pain in order to progress toward personal goals  Status at last note/certification: 1 mile with pain  Goal: Patient will report ability to ascend/descend a flight of stairs with reciprocal step pattern without increase in pain in order to more easily ambulate within the community  Status at last note/certification: pain with ascend/descending stairs  Goal: Patient will report worst Right hip pain as 4/10 or less in order to improve ease of daily tasks.   Status at last note/certification: 6/41    PLAN  [x]  Upgrade activities as tolerated     [x]  Continue plan of care  []  Update interventions per flow sheet       []  Discharge due to:_  []  Other:_      Manuel Duval Ritchie [facility] [place] 8/7/2018  8:54 AM    Future Appointments  Date Time Provider Diana Burton   8/7/2018 9:00 AM Yusef Le [facility]   8/9/2018 10:00 AM Yusef Le [facility]14[facility]2018 [facility]   8/16/2018 10:00 [facility]   8/21/2018 11:00 AM Roderick Abdi Man [facility]   8/23/2018 9:30 AM Yusef Le [facility]   8/28/2018 9:30 AM Sabrina [facility]   8/30/2018 9:30 AM Roberth Wallace [facility]

## 2018-08-09 ENCOUNTER — HOSPITAL ENCOUNTER (OUTPATIENT)
Dept: PHYSICAL THERAPY | Age: 73
Discharge: HOME | End: 2018-08-09
Payer: MEDICARE

## 2018-08-09 PROCEDURE — 97035 APP MDLTY 1+ULTRASOUND EA 15: CPT

## 2018-08-09 NOTE — PROGRESS NOTES
PT DAILY TREATMENT NOTE - [facility]     Patient Name: Karly Erp  Date:2018  : 1945  [x]  Patient  Verified  Payor: [place] MEDICARE / Plan: [place] MEDICARE PART A & B / Product Type: Medicare /    In time:9:55  Out time:10:25  Total Treatment Time (min): 30  Total Timed Codes (min): 30  1:1 Treatment Time ( [address] only): 8   Visit #: 2 of 10    Treatment Area: Pain in right hip [M25.551]    SUBJECTIVE  Pain Level (0-10 scale): /10  Any medication changes, allergies to medications, adverse drug reactions, diagnosis change, or new procedure performed?: [x] No    [] Yes (see summary sheet for update)  Subjective functional status/changes:   [] No changes reported  \"I felt better after the US last visit, but the back part of my hip woke me up lastnight. \"    OBJECTIVE    Modality rationale: decrease inflammation and decrease pain to improve the patients ability to perform ADls with less difficulty.    Min Type Additional Details    [] Estim:  []Unatt       []IFC  []Premod                        []Other:  []w/ice   []w/heat  Position:  Location:    [] Estim: []Att    []TENS instruct  []NMES                    []Other:  []w/US   []w/ice   []w/heat  Position:  Location:    []  Traction: [] Cervical       []Lumbar                       [] Prone          []Supine                       []Intermittent   []Continuous Lbs:  [] before manual  [] after manual   8 [x]  Ultrasound: []Continuous   [x] Pulsed                           []1MHz   [x]3MHz W/cm2:1.2  Location:right GT    []  Iontophoresis with dexamethasone         Location: [] Take home patch   [] In clinic    []  Ice     []  heat  []  Ice massage  []  Laser   []  Anodyne Position:  Location:    []  Laser with stim  []  Other:  Position:  Location:    []  Vasopneumatic Device Pressure:       [] lo [] med [] hi   Temperature: [] lo [] med [] hi   [x] Skin assessment post-treatment:  [x]intact [x]redness- no adverse reaction    []redness - adverse reaction:      22 min Neuromuscular Re-education:  []  See flow sheet :pilates ex. .   Rationale: increase strength and improve coordination  to improve the patients ability to perform ADls with less difficulty. With   [] TE   [] TA   [] neuro   [] other: Patient Education: [x] Review HEP    [] Progressed/Changed HEP based on:   [] positioning   [] body mechanics   [] transfers   [] heat/ice application    [] other:      Other Objective/Functional Measures:     Pain Level (0-10 scale) post treatment: 2/10    ASSESSMENT/Changes in Function: PD  Walking ex. Today. Pt reported no change in pain during or after therapy. Patient will continue to benefit from skilled PT services to modify and progress therapeutic interventions, address functional mobility deficits, address ROM deficits and address strength deficits to attain remaining goals. []  See Plan of Care  []  See progress note/recertification  []  See Discharge Summary         Progress towards goals / Updated goals:  Goal: Patient will improve FOTO score to 63 pts to show improved function. Status at last note/certification: FOTO 53 pts  Goal: Patient will report increased walking tolerance of 2 miles without increase in Right hip pain in order to progress toward personal goals  Status at last note/certification: 1 mile with pain  Goal: Patient will report ability to ascend/descend a flight of stairs with reciprocal step pattern without increase in pain in order to more easily ambulate within the community  Status at last note/certification: pain with ascend/descending stairs  Goal: Patient will report worst Right hip pain as 4/10 or less in order to improve ease of daily tasks.   Status at last note/certification: 1/13    PLAN  [x]  Upgrade activities as tolerated     [x]  Continue plan of care  []  Update interventions per flow sheet       []  Discharge due to:_  []  Other:_      Eliseo Jimenez, TEE 8/9/2018  8:54 AM    Future Appointments  Date Time Provider Diana Burton   8/9/2018 10:00 AM Viri De [facility]   8/14/2018 9:30  [address] [facility]   8/16/2018 10:00 AM Elle Mary [facility]   8/21/2018 11:00 AM Jens Castro [facility]   8/23/2018 9:30 AM Viri De Leon [facility]   8/28/2018 9:30  [address] [facility]   8/30/2018 9:30 AM Aly [facility]

## 2018-08-14 ENCOUNTER — HOSPITAL ENCOUNTER (OUTPATIENT)
Dept: PHYSICAL THERAPY | Age: 73
Discharge: HOME | End: 2018-08-14
Payer: MEDICARE

## 2018-08-14 PROCEDURE — 97112 NEUROMUSCULAR REEDUCATION: CPT

## 2018-08-14 PROCEDURE — 97035 APP MDLTY 1+ULTRASOUND EA 15: CPT

## 2018-08-14 NOTE — PROGRESS NOTES
PT DAILY TREATMENT NOTE - [facility]     Patient Name: Wilmar Pearce  Date:2018  : 1945  [x]  Patient  Verified  Payor: [place] MEDICARE / Plan: [place] MEDICARE PART A & B / Product Type: Medicare /    In time:9:30  Out time:10;00  Total Treatment Time (min): 30  Total Timed Codes (min): 30  1:1 Treatment Time ( [address] only): 30   Visit #: 3 of 10    Treatment Area: Pain in right hip [M25.551]    SUBJECTIVE  Pain Level (0-10 scale): 1  Any medication changes, allergies to medications, adverse drug reactions, diagnosis change, or new procedure performed?: [x] No    [] Yes (see summary sheet for update)  Subjective functional status/changes:   [] No changes reported  I feel like it's really getting better. I have a membership at [employer], but I haven't gone back yet.       OBJECTIVE    Modality rationale: decrease inflammation, decrease pain and increase tissue extensibility to improve the patients ability to improve ease of gait, function   Min Type Additional Details    [] Estim:  []Unatt       []IFC  []Premod                        []Other:  []w/ice   []w/heat  Position:  Location:    [] Estim: []Att    []TENS instruct  []NMES                    []Other:  []w/US   []w/ice   []w/heat  Position:  Location:    []  Traction: [] Cervical       []Lumbar                       [] Prone          []Supine                       []Intermittent   []Continuous Lbs:  [] before manual  [] after manual   8 [x]  Ultrasound: []Continuous   [x] Pulsed                           []1MHz   [x]3MHz W/cm2: 1.3  Location: right GT     []  Iontophoresis with dexamethasone         Location: [] Take home patch   [] In clinic    []  Ice     []  heat  []  Ice massage  []  Laser   []  Anodyne Position:  Location:    []  Laser with stim  []  Other:  Position:  Location:    []  Vasopneumatic Device Pressure:       [] lo [] med [] hi   Temperature: [] lo [] med [] hi   [x] Skin assessment post-treatment:  [x]intact []redness- no adverse reaction    []redness - adverse reaction:         22 min Neuromuscular Re-education:  []  See flow sheet :   Rationale: increase strength and improve coordination  to improve the patients ability to increase hip stability for ease of gait, stairs         With   [] TE   [] TA   [] neuro   [] other: Patient Education: [x] Review HEP    [] Progressed/Changed HEP based on:   [] positioning   [] body mechanics   [] transfers   [] heat/ice application    [] other:      Other Objective/Functional Measures: PD warm-up walks     Pain Level (0-10 scale) post treatment:  1    ASSESSMENT/Changes in Function: pt is progressing with decreased pain. She states that she has catie procrastinating about returning to gym but plans to go back     Patient will continue to benefit from skilled PT services to modify and progress therapeutic interventions, address functional mobility deficits, address ROM deficits, address strength deficits, analyze and address soft tissue restrictions, analyze and cue movement patterns, analyze and modify body mechanics/ergonomics, assess and modify postural abnormalities, address imbalance/dizziness and instruct in home and community integration to attain remaining goals. []  See Plan of Care  []  See progress note/recertification  []  See Discharge Summary         Progress towards goals / Updated goals:  Goal: Patient will improve FOTO score to 63 pts to show improved function.   Status at last note/certification: FOTO 53 pts  Goal: Patient will report increased walking tolerance of 2 miles without increase in Right hip pain in order to progress toward personal goals  Status at last note/certification: 1 mile with pain  Goal: Patient will report ability to ascend/descend a flight of stairs with reciprocal step pattern without increase in pain in order to more easily ambulate within the community  Status at last note/certification: pain with ascend/descending stairs  Goal: Patient will report worst Right hip pain as 4/10 or less in order to improve ease of daily tasks.   Status at last note/certification: 3/80     PLAN  [x]  Upgrade activities as tolerated     []  Continue plan of care  []  Update interventions per flow sheet       []  Discharge due to:_  []  Other:_      David Ba PTA 8/14/2018  9:34 AM    Future Appointments  Date Time Provider Diana Burton   8/16/2018 10:00  [address] [facility]   8/21/2018 11:00 AM Delbert Cam[facility]   8/23/2018 9:30 AM Emanuel Meléndez PTA [facility]   8/28/2018 9:30  [address] [facility]   8/30/2018 9:30  [address] [facility]

## 2018-08-16 ENCOUNTER — HOSPITAL ENCOUNTER (OUTPATIENT)
Dept: PHYSICAL THERAPY | Age: 73
Discharge: HOME | End: 2018-08-16
Payer: MEDICARE

## 2018-08-16 PROCEDURE — 97035 APP MDLTY 1+ULTRASOUND EA 15: CPT

## 2018-08-16 PROCEDURE — 97112 NEUROMUSCULAR REEDUCATION: CPT

## 2018-08-16 NOTE — PROGRESS NOTES
PT DAILY TREATMENT NOTE - [facility]     Patient Name: Liliya Reading  Date:2018  : 1945  [x]  Patient  Verified  Payor: [place] MEDICARE / Plan: [place] MEDICARE PART A & B / Product Type: Medicare /    In time:10;00  Out time:10;30  Total Treatment Time (min): 30  Total Timed Codes (min): 30  1:1 Treatment Time ( [address] only): 30   Visit #: 4 of 10    Treatment Area: Pain in right hip [M25.551]    SUBJECTIVE  Pain Level (0-10 scale): 1  Any medication changes, allergies to medications, adverse drug reactions, diagnosis change, or new procedure performed?: [x] No    [] Yes (see summary sheet for update)  Subjective functional status/changes:   [] No changes reported  The pain during the day isn't too bad, as long as I don't over do it. But the pain at night isn't getting better. .    OBJECTIVE    Modality rationale: decrease pain and increase tissue extensibility to improve the patients ability to improve ease of sleep   Min Type Additional Details    [] Estim:  []Unatt       []IFC  []Premod                        []Other:  []w/ice   []w/heat  Position:  Location:    [] Estim: []Att    []TENS instruct  []NMES                    []Other:  []w/US   []w/ice   []w/heat  Position:  Location:    []  Traction: [] Cervical       []Lumbar                       [] Prone          []Supine                       []Intermittent   []Continuous Lbs:  [] before manual  [] after manual   8 [x]  Ultrasound: [x]Continuous   [] Pulsed                           [x]1MHz   []3MHz W/cm2:  1.4  Location: right piriformis    []  Iontophoresis with dexamethasone         Location: [] Take home patch   [] In clinic    []  Ice     []  heat  []  Ice massage  []  Laser   []  Anodyne Position:  Location:    []  Laser with stim  []  Other:  Position:  Location:    []  Vasopneumatic Device Pressure:       [] lo [] med [] hi   Temperature: [] lo [] med [] hi   [x] Skin assessment post-treatment:  [x]intact []redness- no adverse reaction []redness - adverse reaction:          22 min Neuromuscular Re-education:  []  See flow sheet :   Rationale: increase strength and improve coordination  to improve the patients ability to increase hip stability            With   [] TE   [] TA   [] neuro   [] other: Patient Education: [x] Review HEP    [] Progressed/Changed HEP based on:   [] positioning   [] body mechanics   [] transfers   [] heat/ice application    [] other:      Other Objective/Functional Measures:      Pain Level (0-10 scale) post treatment:  1    ASSESSMENT/Changes in Function:  Taut bands in piriformis, with pt describing referral pattern over glute/piriformis region  Minimal pain after session, however most pain at night    Patient will continue to benefit from skilled PT services to modify and progress therapeutic interventions, address functional mobility deficits, address ROM deficits, address strength deficits, analyze and address soft tissue restrictions, analyze and cue movement patterns, analyze and modify body mechanics/ergonomics, assess and modify postural abnormalities, address imbalance/dizziness and instruct in home and community integration to attain remaining goals. []  See Plan of Care  []  See progress note/recertification  []  See Discharge Summary         Progress towards goals / Updated goals:  Goal: Patient will improve FOTO score to 63 pts to show improved function.   Status at last note/certification: FOTO 53 pts  Goal: Patient will report increased walking tolerance of 2 miles without increase in Right hip pain in order to progress toward personal goals  Status at last note/certification: 1 mile with pain  Goal: Patient will report ability to ascend/descend a flight of stairs with reciprocal step pattern without increase in pain in order to more easily ambulate within the community  Status at last note/certification: pain with ascend/descending stairs  Goal: Patient will report worst Right hip pain as 4/10 or less in order to improve ease of daily tasks.   Status at last note/certification: 7/07  6-7/10 at night      PLAN  []  Upgrade activities as tolerated     []  Continue plan of care  []  Update interventions per flow sheet       []  Discharge due to:_  []  Other:_      Femrin Murphy, [facility] 8/16/2018  10:40 AM    Future Appointments  Date Time Provider Diana Burton   8/21/2018 11:00 AM Pal Perez, PT [facility]   8/23/2018 9:30 AM Suzanne Cunningham PTA [facility]   8/28/2018 9:30  [address] [facility]   8/30/2018 9:30  [address] [facility]

## 2018-08-21 ENCOUNTER — HOSPITAL ENCOUNTER (OUTPATIENT)
Dept: PHYSICAL THERAPY | Age: 73
Discharge: HOME | End: 2018-08-21
Payer: MEDICARE

## 2018-08-21 PROCEDURE — 97035 APP MDLTY 1+ULTRASOUND EA 15: CPT

## 2018-08-21 PROCEDURE — 97112 NEUROMUSCULAR REEDUCATION: CPT

## 2018-08-21 NOTE — PROGRESS NOTES
PT DAILY TREATMENT NOTE - [facility]     Patient Name: Haseeb De Anda  Date:2018  : 1945  [x]  Patient  Verified  Payor: Margie Collins / Plan: [place] MEDICARE PART A & B / Product Type: Medicare /    In time:1055  Out time:1126  Total Treatment Time (min): 31  Total Timed Codes (min): 31  1:1 Treatment Time ( [address] only): 31   Visit #: 5 of 10    Treatment Area: Pain in right hip [M25.551]    SUBJECTIVE  Pain Level (0-10 scale): 1  Any medication changes, allergies to medications, adverse drug reactions, diagnosis change, or new procedure performed?: [x] No    [] Yes (see summary sheet for update)  Subjective functional status/changes:   [] No changes reported  I just got out of the car and didn't holler. I'm feeling better.      OBJECTIVE    Modality rationale: decrease inflammation and decrease pain to improve the patients ability to improve activity tolerance   Min Type Additional Details    [] Estim:  []Unatt       []IFC  []Premod                        []Other:  []w/ice   []w/heat  Position:  Location:    [] Estim: []Att    []TENS instruct  []NMES                    []Other:  []w/US   []w/ice   []w/heat  Position:  Location:    []  Traction: [] Cervical       []Lumbar                       [] Prone          []Supine                       []Intermittent   []Continuous Lbs:  [] before manual  [] after manual   8 [x]  Ultrasound: []Continuous   [x] Pulsed                           []1MHz   [x]3MHz W/cm2:1.4  Location:Right glutes    []  Iontophoresis with dexamethasone         Location: [] Take home patch   [] In clinic    []  Ice     []  heat  []  Ice massage  []  Laser   []  Anodyne Position:  Location:    []  Laser with stim  []  Other:  Position:  Location:    []  Vasopneumatic Device Pressure:       [] lo [] med [] hi   Temperature: [] lo [] med [] hi   [] Skin assessment post-treatment:  []intact []redness- no adverse reaction    []redness - adverse reaction:     23 min Neuromuscular Re-education: [x]  See flow sheet :   Rationale: increase strength and improve coordination  to improve the patients ability to improve hip stability      With   [] TE   [] TA   [] neuro   [] other: Patient Education: [x] Review HEP    [] Progressed/Changed HEP based on:   [] positioning   [] body mechanics   [] transfers   [] heat/ice application    [] other:      Other Objective/Functional Measures: completed exercises per flow sheet     Pain Level (0-10 scale) post treatment: 1    ASSESSMENT/Changes in Function: Patient is making good gains in mobility and reports improved walking tolerance and good compliance with HEP. Reviewed appropriate gradual progression of activity tolerance and gym based exercises. Patient states that she will plan to DC next visit. Patient will continue to benefit from skilled PT services to modify and progress therapeutic interventions, address functional mobility deficits, address ROM deficits, address strength deficits, analyze and address soft tissue restrictions, analyze and cue movement patterns, analyze and modify body mechanics/ergonomics, assess and modify postural abnormalities, address imbalance/dizziness and instruct in home and community integration to attain remaining goals. []  See Plan of Care  []  See progress note/recertification  []  See Discharge Summary         Progress towards goals / Updated goals:  Goal: Patient will improve FOTO score to 63 pts to show improved function.   Status at last note/certification: FOTO 53 pts  Current status: Progressing, 61  Goal: Patient will report increased walking tolerance of 2 miles without increase in Right hip pain in order to progress toward personal goals  Status at last note/certification: 1 mile with pain  Current status: Progressing, able to walk at least a mile  Goal: Patient will report ability to ascend/descend a flight of stairs with reciprocal step pattern without increase in pain in order to more easily ambulate within the community  Status at last note/certification: pain with ascend/descending stairs  Current status: Progressing, continues to report moderate difficulty   Goal: Patient will report worst Right hip pain as 4/10 or less in order to improve ease of daily tasks.   Status at last note/certification: 3/16  Current status: Progressing, 6-7/10 at night         PLAN  [x]  Upgrade activities as tolerated     [x]  Continue plan of care  []  Update interventions per flow sheet       []  Discharge due to:_  []  Other:_      Phyllis Chaves, PT 8/21/2018  7:14 AM    Future Appointments  Date Time Provider Diana Burton   8/21/2018 11:00 AM Phyllis Chaves, PT [facility]   8/23/2018 9:30 AM Miguel Aguilar PTA [facility]   8/28/2018 9:30  [address] [facility]   8/30/2018 9:30  [address] [facility]

## 2018-08-23 ENCOUNTER — HOSPITAL ENCOUNTER (OUTPATIENT)
Dept: PHYSICAL THERAPY | Age: 73
Discharge: HOME | End: 2018-08-23
Payer: MEDICARE

## 2018-08-23 PROCEDURE — 97140 MANUAL THERAPY 1/> REGIONS: CPT

## 2018-08-23 PROCEDURE — G8979 MOBILITY GOAL STATUS: HCPCS

## 2018-08-23 PROCEDURE — 97035 APP MDLTY 1+ULTRASOUND EA 15: CPT

## 2018-08-23 PROCEDURE — G8980 MOBILITY D/C STATUS: HCPCS

## 2018-08-23 NOTE — PROGRESS NOTES
PT DISCHARGE DAILY NOTE AND [id number]    Date:2018  Patient name: Vannessa Martins Start of Care: 2018   Referral source: Nuha James MD : 1945                         Medical Diagnosis: Pain in right hip [M25.551] Onset Date:3/2018                         Treatment Diagnosis: Right Hip Pain   Prior Hospitalization: see medical history Provider#: [id number]   Medications: Verified on Patient summary List    Comorbidities: Arthritis; Asthma; Neck pain; hx of ulcers; Headaches   Prior Level of Function: Lives in high [facility] apt - elevator; functionally independent; exercising 3x/wk at [employer]    Visits from [place] of Care: 15    Missed Visits: 0    Reporting Period : 18 to 18    [x]  Patient  Verified  Payor: Renate Kelly / Plan: VA MEDICARE PART A & B / Product Type: Medicare /    In time:926  Out time:1002  Total Treatment Time (min): 36  Total Timed Codes (min): 36  1:1 Treatment Time ( [address] only): 36   Visit #: 6 of 10    SUBJECTIVE  Pain Level (0-10 scale): 2  Any medication changes, allergies to medications, adverse drug reactions, diagnosis change, or new procedure performed?: [x] No    [] Yes (see summary sheet for update)  Subjective functional status/changes:   [] No changes reported  It's just a little ache but I feel encouraged.      OBJECTIVE    Modality rationale: decrease inflammation and decrease pain to improve the patients ability to improve activity tolerance   Min Type Additional Details    [] Estim:  []Unatt       []IFC  []Premod                        []Other:  []w/ice   []w/heat  Position:  Location:    [] Estim: []Att    []TENS instruct  []NMES                    []Other:  []w/US   []w/ice   []w/heat  Position:  Location:    []  Traction: [] Cervical       []Lumbar                       [] Prone          []Supine                       []Intermittent   []Continuous Lbs:  [] before manual  [] after manual   8 [x]  Ultrasound: []Continuous   [x] Pulsed []1MHz   [x]3MHz W/cm2:1.4  Location:Right glutes    []  Iontophoresis with dexamethasone         Location: [] Take home patch   [] In clinic    []  Ice     []  heat  []  Ice massage  []  Laser   []  Anodyne Position:  Location:    []  Laser with stim  []  Other:  Position:  Location:    []  Vasopneumatic Device Pressure:       [] lo [] med [] hi   Temperature: [] lo [] med [] hi   [] Skin assessment post-treatment:  []intact []redness- no adverse reaction    []redness - adverse reaction:     20 min Neuromuscular Re-education:  [x]  See flow sheet :   Rationale: increase strength and improve coordination  to improve the patients ability to improve hip stability with recreational activities     8 min Manual Therapy:  STM Right glute max/piriformis   Rationale: decrease pain, increase ROM, increase tissue extensibility and decrease trigger points to improve ease of mobility    With   [] TE   [] TA   [] neuro   [] other: Patient Education: [x] Review HEP    [] Progressed/Changed HEP based on:   [] positioning   [] body mechanics   [] transfers   [] heat/ice application    [] other:      Other Objective/Functional Measures: see goals     Pain Level (0-10 scale) post treatment: 1    Summary of Care:  Goal: Patient will improve FOTO score to 63 pts to show improved function.   Status at last note/certification: FOTO 53 pts  Current status: Progressing, 61  Goal: Patient will report increased walking tolerance of 2 miles without increase in Right hip pain in order to progress toward personal goals  Status at last note/certification: 1 mile with pain  Current status: Progressing, able to walk at least a mile  Goal: Patient will report ability to ascend/descend a flight of stairs with reciprocal step pattern without increase in pain in order to more easily ambulate within the community  Status at last note/certification: pain with ascend/descending stairs  Current status: Progressing, continues to report moderate difficulty   Goal: Patient will report worst Right hip pain as 4/10 or less in order to improve ease of daily tasks. Status at last note/certification: 5/45  Current status: Met, 2-3/10 recently     ASSESSMENT/Changes in Function: Patient has consistently attended therapy for Right hip pain. She currently reports good improvement in pain levels and activity tolerance, with greater ease managing LE into car and with negotiating stairs. Reviewed appropriate walking routine and gradual return to gym with Patient, as well as incorporating hip strengthening with walking to manage symptoms and prevent return of higher pain levels. Due to ability to manage independently Patient will discharge at this time. G-Codes (GP)  Mobility   [id number] Goal  CJ= 20-39%  D/C  CJ= 20-39%    The severity rating is based on clinical judgment and the FOTO score.       Thank you for this referral!     PLAN  [x]Discontinue therapy: [x]Patient has reached or is progressing toward set goals      []Patient is non-compliant or has abdicated      []Due to lack of appreciable progress towards set goals    Kurt Gonzalez, PT 8/23/2018  7:13 AM

## 2018-08-28 ENCOUNTER — APPOINTMENT (OUTPATIENT)
Dept: PHYSICAL THERAPY | Age: 73
End: 2018-08-28
Payer: MEDICARE

## 2018-08-30 ENCOUNTER — APPOINTMENT (OUTPATIENT)
Dept: PHYSICAL THERAPY | Age: 73
End: 2018-08-30
Payer: MEDICARE

## 2020-11-11 ENCOUNTER — HOSPITAL ENCOUNTER (OUTPATIENT)
Dept: PHYSICAL THERAPY | Age: 75
Discharge: HOME | End: 2020-11-11
Payer: MEDICARE

## 2020-11-11 PROCEDURE — 97161 PT EVAL LOW COMPLEX 20 MIN: CPT

## 2020-11-11 NOTE — PROGRESS NOTES
[facility], [address] [place] [address]  [phone] fax  Plan of Care/ Statement of Necessity for Physical Therapy Services    Patient name: Charlene Quintana Start of Care: 2020   Referral source: Montana Carrero MD : 1945    Medical Diagnosis: Pain in right hip [M25.551]  Payor: VA MEDICARE / Plan: VA MEDICARE PART A & B / Product Type: Medicare /  Onset Date:  ~chronic, 2 years ago    Treatment Diagnosis: R hip pain   Prior Hospitalization: see medical history Provider#: [id number]   Medications: Verified on Patient summary List    Comorbidities: osteoporosis, arthritis, thyroid problems, asthma    Prior Level of Function: indep      The Plan of Care and following information is based on the information from the initial evaluation. Assessment/ key information:   Assessment / key information:  Patient is a 76 y.o. female who presents to [facility] at [place] with diagnosis of Pain in right hip [M25.551]. Patient reports pain 7/10  on R hip. Pt reports x-rays performed ~3 mo ago and she reports arthritis in R hip. Patient's pain level ranges from 7/10 C, and ranges 3/10 to 9/10. Pt reports pain in R hip commenced about 2 years ago. Pain  Increases with standing, moving, decreases with heat, rest.  Upon objective evaluation patient presents with impaired and painful AROM of R hip, impaired strength in R hip flexion, abduction and extension and decreased flexibility of quad and Hamstring muscles. Patient ambulates with no assistive device demonstrating gait deviation of antalgic gait pattern. Patient scored 52 on FOTO indicating  decreased function and quality of life. Functional limitations include pain with walking, stairs, getting in and out of car. Pt denies any resent falls. Gait Antalgic. Postural deviations: rounded shoulders. Visual inspection: skin inctact.   Hip AROM:    Right flexion: 85 deg (p!), extension: 0 deg (p!), abduction 28 deg, adduction: 0deg  Left flexion 120 deg, extension 0 deg, abduction: 30 deg, adduction: 0 deg    Manual muscle test:   Right knee extension WFL, knee flexion WFL,  hip flexion 3+/5, hip abd 3-/5 (p!), hip ext 3/5, ankle DF WFL,   Left knee extension WFL, knee flexion WFL,  hip flexion 4-/5, hip abd 4/5, hip ext 3/5, ankle DF WFL    Flexibility: Passive SLR right 60/90,  left 60/90. Ely's (+) bilat  Palpation :Ttp R hip and R piriformis . Justification for Eval Code Complexity:  Patient History : mod  Examination see exam   Clinical Presentation: stable  Clinical Decision Making : FOTO : 49 /100    Patient can benefit from skilled PT to increase bilat hip strength and core strength, decrease pain to improve overall function and quality of life.     Evaluation Complexity History MEDIUM  Complexity : 1-2 comorbidities / personal factors will impact the outcome/ POC ; Examination MEDIUM Complexity : 3 Standardized tests and measures addressing body structure, function, activity limitation and / or participation in recreation  ;Presentation LOW Complexity : Stable, uncomplicated  ;Clinical Decision Making MEDIUM Complexity : FOTO score of 26-74  Overall Complexity Rating: LOW   Problem List: pain affecting function, decrease ROM, decrease strength, impaired gait/ balance, decrease ADL/ functional abilitiies, decrease activity tolerance and decrease flexibility/ joint mobility   Treatment Plan may include any combination of the following: Therapeutic exercise, Therapeutic activities, Neuromuscular re-education, Physical agent/modality, Gait/balance training, Manual therapy, Aquatic therapy, Patient education, Self Care training, Functional mobility training, Home safety training and Stair training  Patient / Family readiness to learn indicated by: asking questions, trying to perform skills and interest  Persons(s) to be included in education: patient (P)  Barriers to Learning/Limitations: None  Patient Goal (s): Veryl Anderson and localize  Patient Self Reported Health Status: good  Rehabilitation Potential: good    · Short Term Goals: To be accomplished in 2  weeks:  1) Patient will be independent in performing daily initial home exercise program.. 2) Increase R hip AROM flexion by  120 degrees to facilitate ambulation, LE's functional activities and ADLs. Status at IE: 85 deg (p!)  · Long Term Goals: To be accomplished in  4  weeks:  1) Patient will be independent with comprehensive and updated HEP. 2) Patient will achieve +4 on GROC to improve ambulating, ascending and descending stairs, functional mobility and transfers  3) Increase FOTO to 60 indicating improved function and quality of life. Status at IE: 52  4) Patient will increase R hip strength in flexion, abduction, and extension to at least 4/5 so patient has improved ability to perform LE functional activities and ADLs. Status at IE: flexion: 3+/5, abduction 3-/5, hip extension: 3/5    Frequency / Duration: Patient to be seen 2 times per week for 4 weeks. Patient/ Caregiver education and instruction: Diagnosis, prognosis, other PT POC   [x]  Plan of care has been reviewed with PTA    Certification Period: 11/11/2020 - 12/11/2020  Princeton Simmonds, PT 11/11/2020 13:54 pm  _____________________________________________________________________  I certify that the above Therapy Services are being furnished while the patient is under my care. I agree with the treatment plan and certify that this therapy is necessary.     Physician's Signature:____________Date:_________TIME:________    ** Signature, Date and Time must be completed for valid certification **    Please sign and return to [facility], [address] [place] [address]  [phone] fax

## 2020-11-11 NOTE — PROGRESS NOTES
PT DAILY TREATMENT NOTE/ EVAL    Patient Name: Felipa Rutherford  Date:2020  : 1945  [x]  Patient  Verified  Payor: VA MEDICARE / Plan: VA MEDICARE PART A & B / Product Type: Medicare /    In time: 7391  Out time:10:15  Total Treatment Time (min): 23  Visit #: 1 of 8    Medicare/BCBS Only   Total Timed Codes (min):  0 1:1 Treatment Time:  23     Treatment Area: Pain in right hip [M25.551]    SUBJECTIVE  Pain Level (0-10 scale): 7  []constant [x]intermittent []improving []worsening []no change since onset    Any medication changes, allergies to medications, adverse drug reactions, diagnosis change, or new procedure performed?: See summary sheet. Subjective functional status/changes:     Pt reports X-rays performed about 3 months ago    OBJECTIVE/EXAMINATION  SEE POC        23 min [x]Eval                  []Re-Eval                 With   X Patient Education: [] Review HEP    [] Progressed/Changed HEP based on:   [] positioning   [] body mechanics   [] transfers   [] heat/ice application    [x] other: PT POC     Other Objective/Functional Measures: SEE POC      Other tests/comments: SEE POC       Pain Level (0-10 scale) post treatment: 5    ASSESSMENT/Changes in Function: SEE POC    Patient will benefit from skilled PT services to modify and progress therapeutic interventions, address functional mobility deficits, address ROM deficits, address strength deficits, analyze and address soft tissue restrictions, analyze and cue movement patterns, analyze and modify body mechanics/ergonomics, assess and modify postural abnormalities, address imbalance/dizziness and instruct in home and community integration to attain goals and maximize pt's functional status.       [x]  See Plan of Care  []  See progress note/recertification  []  See Discharge Summary         Progress towards goals / Updated goals:  Goals established at time of evaluation     PLAN  []  Upgrade activities as tolerated     []  Continue plan of care  []  Update interventions per flow sheet       []  Discharge due to:_  [x]  Other: Pt will benefit from skilled OP PT 2 a week for 4 weeks in order to address impairments and maximize functional status.      Abrahan Kelley, PT 11/11/2020  11:20 AM

## 2020-11-13 ENCOUNTER — HOSPITAL ENCOUNTER (OUTPATIENT)
Dept: PHYSICAL THERAPY | Age: 75
Discharge: HOME | End: 2020-11-13
Payer: MEDICARE

## 2020-11-13 PROCEDURE — 97110 THERAPEUTIC EXERCISES: CPT

## 2020-11-13 NOTE — PROGRESS NOTES
PT DAILY TREATMENT NOTE 10-18    Patient Name: Jarred Ramirez  Date:2020  : 1945  [x]  Patient  Verified  Payor: Adri Solis / Plan: [place] [employer]AGE / Product Type: Managed Care Medicare /    In time:2:14  Out time:3:00  Total Treatment Time (min): 55  Visit #: 2 of 8    Medicare/BCBS Only   Total Timed Codes (min):  46 1:1 Treatment Time:  42       Treatment Area: Pain in right hip [M25.551]    SUBJECTIVE  Pain Level (0-10 scale): 6  Any medication changes, allergies to medications, adverse drug reactions, diagnosis change, or new procedure performed?: [x] No    [] Yes (see summary sheet for update)  Subjective functional status/changes:   [] No changes reported  \"Last night was a bad night, I could not seem to get comfortable no matter what I did. \"    OBJECTIVE    46 min Therapeutic Exercise:  [x] See flow sheet :   Rationale: increase ROM and increase strength to improve the patients ability to perform gait and transfers with improved LE strength and mobility. With   [] TE   [] TA   [] neuro   [] other: Patient Education: [x] Review HEP    [] Progressed/Changed HEP based on:   [] positioning   [] body mechanics   [] transfers   [] heat/ice application    [] other:      Other Objective/Functional Measures: -Initiated treatment per flowsheet  -Provided pt copy of HEP     Pain Level (0-10 scale) post treatment: 0    ASSESSMENT/Changes in Function: Patient reporting decreased pain following more mobility focused interventions during today's session. She was unable to complete hip internal rotation strengthening today due to increased pain.     Patient will continue to benefit from skilled PT services to modify and progress therapeutic interventions, address functional mobility deficits, address ROM deficits, address strength deficits, analyze and address soft tissue restrictions, analyze and cue movement patterns, analyze and modify body mechanics/ergonomics, assess and modify postural abnormalities and address imbalance/dizziness to attain remaining goals. []  See Plan of Care  []  See progress note/recertification  []  See Discharge Summary         Progress towards goals / Updated goals:  1) Patient will be independent in performing daily initial home exercise program..  Current: distributed HEP at first follow up session  2) Increase R hip AROM flexion by  120 degrees to facilitate ambulation, LE's functional activities and ADLs. Status at IE: 85 deg (p!)  ·      Long Term Goals: To be accomplished in  4  weeks:  1) Patient will be independent with comprehensive and updated HEP. 2) Patient will achieve +4 on GROC to improve ambulating, ascending and descending stairs, functional mobility and transfers  3) Increase FOTO to 60 indicating improved function and quality of life. Status at IE: 52  4) Patient will increase R hip strength in flexion, abduction, and extension to at least 4/5 so patient has improved ability to perform LE functional activities and ADLs.    Status at IE: flexion: 3+/5, abduction 3-/5, hip extension: 3/5    PLAN  [x]  Upgrade activities as tolerated     [x]  Continue plan of care  []  Update interventions per flow sheet       []  Discharge due to:_  []  Other:_      Carmen Lincoln 11/13/2020  2:29 PM    Future Appointments   Date Time Provider Diana Burton   11/18/2020 11:15 AM Mu Conner PT [facility]   11/20/2020 12:00 PM Mu Conner PT [facility]   11/23/2020  1:30 PM Mu Conner PT [facility]   11/25/2020 12:00 PM Yesi Townsend   11/30/2020 12:00 PM Mu Conner PT [facility]

## 2020-11-18 ENCOUNTER — HOSPITAL ENCOUNTER (OUTPATIENT)
Dept: PHYSICAL THERAPY | Age: 75
Discharge: HOME | End: 2020-11-18
Payer: MEDICARE

## 2020-11-18 PROCEDURE — 97110 THERAPEUTIC EXERCISES: CPT

## 2020-11-18 NOTE — PROGRESS NOTES
PT DAILY TREATMENT NOTE     Patient Name: Ton Huizar  Date:2020  : 1945  [x]  Patient  Verified  Payor: Josephjosiahvasyl Velasquez / Plan: [place] [employer]ICARE ADVANTAGE / Product Type: Managed Care Medicare /    In OXQU:[id number]  Out time:1158  Total Treatment Time (min): 40  Visit #: 3 of 8    Medicare/BCBS Only   Total Timed Codes (min):  44 1:1 Treatment Time:  44       Treatment Area: Pain in right hip [M25.551]    SUBJECTIVE  Pain Level (0-10 scale): 4-5  Any medication changes, allergies to medications, adverse drug reactions, diagnosis change, or new procedure performed?: [x] No    [] Yes (see summary sheet for update)  Subjective functional status/changes:   [] No changes reported  It's not too bad right now.      OBJECTIVE    Modality rationale: Patient declined   Min Type Additional Details    [] Estim:  []Unatt       []IFC  []Premod                        []Other:  []w/ice   []w/heat  Position:  Location:    [] Estim: []Att    []TENS instruct  []NMES                    []Other:  []w/US   []w/ice   []w/heat  Position:  Location:    []  Traction: [] Cervical       []Lumbar                       [] Prone          []Supine                       []Intermittent   []Continuous Lbs:  [] before manual  [] after manual    []  Ultrasound: []Continuous   [] Pulsed                           []1MHz   []3MHz W/cm2:  Location:    []  Iontophoresis with dexamethasone         Location: [] Take home patch   [] In clinic    []  Ice     []  heat  []  Ice massage  []  Laser   []  Anodyne Position:  Location:    []  Laser with stim  []  Other:  Position:  Location:    []  Vasopneumatic Device Pressure:       [] lo [] med [] hi   Temperature: [] lo [] med [] hi   [] Skin assessment post-treatment:  []intact []redness- no adverse reaction    []redness  adverse reaction:     44 min Therapeutic Exercise:  [x] See flow sheet :   Rationale: increase ROM and increase strength to improve the patients ability to improve ease of mobility, transfers    With   [] TE   [] TA   [] neuro   [] other: Patient Education: [x] Review HEP    [] Progressed/Changed HEP based on:   [] positioning   [] body mechanics   [] transfers   [] heat/ice application    [] other:      Other Objective/Functional Measures: added DKTC with swiss ball     Pain Level (0-10 scale) post treatment: 4-5    ASSESSMENT/Changes in Function: Patient demonstrates improving Right hip AROM and overall stability. She does have difficulty with getting up and moving after sitting, as well as with prolonged walking (pain is more severe following walking). She also reports intermittent buckling and concerns with balance. We will continue to progress as able. Patient will continue to benefit from skilled PT services to modify and progress therapeutic interventions, address functional mobility deficits, address ROM deficits, address strength deficits, analyze and address soft tissue restrictions, analyze and cue movement patterns, analyze and modify body mechanics/ergonomics, assess and modify postural abnormalities, address imbalance/dizziness and instruct in home and community integration to attain remaining goals. []  See Plan of Care  []  See progress note/recertification  []  See Discharge Summary         Progress towards goals / Updated goals:  1) Patient will be independent in performing daily initial home exercise program..  Status at last note/certification: n/a  Current: met; distributed HEP at first follow up session and patient is compliant   2) Increase R hip AROM flexion to 120 degrees to facilitate ambulation, LE's functional activities and ADLs.     Status at last note/certification: 85 deg (p!)  Current: met, 129 deg in supine, no pain  Long Term Goals: To be accomplished in  4  weeks:  1) Patient will be independent with comprehensive and updated HEP.    Status at last note/certification: n/a  Current: update HEP closer to discharge  2) Patient will achieve +4 on GROC (on FOTO) to improve ambulating, ascending and descending stairs, functional mobility and transfers  Status at last note/certification: n/a  Current: reassess with FOTO at MD note  3) Increase FOTO to 60 indicating improved function and quality of life. Status at last note/certification: 49  Current: reassess at MD note  4) Patient will increase R hip strength in flexion, abduction, and extension to at least 4/5 so patient has improved ability to perform LE functional activities and ADLs.    Status at last note/certification: flexion: 3+/5, abduction 3-/5, hip extension: 3/5  Current: reassess closer to MD note    PLAN  [x]  Upgrade activities as tolerated     [x]  Continue plan of care  [x]  Update interventions per flow sheet       []  Discharge due to:_  []  Other:_      Shawna Davalos PT 11/18/2020  11:15 AM    Future Appointments   Date Time Provider Diana Burton   11/18/2020 11:15 AM Anai Jensen [facility]   11/20/2020 12:00 PM Anai Jensen [facility]   11/23/2020  1:30 PM Anai Jensen [facility]   11/25/2020 12:00 PM [place], [address]   11/30/2020 12:00 PM Anai Jensen [facility]

## 2020-11-19 ENCOUNTER — HOSPITAL ENCOUNTER (OUTPATIENT)
Dept: PHYSICAL THERAPY | Age: 75
Discharge: HOME | End: 2020-11-19
Payer: MEDICARE

## 2020-11-19 PROCEDURE — 97110 THERAPEUTIC EXERCISES: CPT

## 2020-11-19 NOTE — PROGRESS NOTES
PT DAILY TREATMENT NOTE     Patient Name: Brittani Guzman  Date:2020  : 1945  [x]  Patient  Verified  Payor: Yoselin Car / Plan: [place] [employer] MEDICARE ADVANTAGE / Product Type: Managed Care Medicare /    In time:3:47  Out time:4:31  Total Treatment Time (min): 44  Visit #: 4 of 8    Medicare/BCBS Only   Total Timed Codes (min):  44 1:1 Treatment Time:  44       Treatment Area: Pain in right hip [M25.551]    SUBJECTIVE  Pain Level (0-10 scale): 4-510  Any medication changes, allergies to medications, adverse drug reactions, diagnosis change, or new procedure performed?: [x] No    [] Yes (see summary sheet for update)  Subjective functional status/changes:   [] No changes reported  \"I feel pretty good. I'm starting to loosen up. \"    OBJECTIVE    44 min Therapeutic Exercise:  [x] See flow sheet :   Rationale: increase ROM and increase strength to improve the patients ability to improve ease of transfers, standing/amb activities    With   [] TE   [] TA   [] neuro   [] other: Patient Education: [x] Review HEP    [] Progressed/Changed HEP based on:   [] positioning   [] body mechanics   [] transfers   [] heat/ice application    [] other:      Other Objective/Functional Measures: performed exercises per flow sheet. Pain Level (0-10 scale) post treatment: 3-4/10    ASSESSMENT/Changes in Function: Pt reports since start of care, hip mobility has improved and stiffness in hips have lessened. Added adductor stretch to HEP. Will continue to progress strengthening program in clinic per Pt tolerance to improve B hip stability and ease of ambulation on various surfaces without increased pain.      Patient will continue to benefit from skilled PT services to modify and progress therapeutic interventions, address functional mobility deficits, address ROM deficits, address strength deficits, analyze and address soft tissue restrictions, analyze and cue movement patterns, analyze and modify body mechanics/ergonomics, assess and modify postural abnormalities, address imbalance/dizziness and instruct in home and community integration to attain remaining goals. []  See Plan of Care  []  See progress note/recertification  []  See Discharge Summary         Progress towards goals / Updated goals:  1) Patient will be independent in performing daily initial home exercise program..  Status at last note/certification: n/a  Current: met; distributed HEP at first follow up session and patient is compliant   2) Increase R hip AROM flexion to 120 degrees to facilitate ambulation, LE's functional activities and ADLs.     Status at last note/certification: 85 deg (p!)  Current: met, 129 deg in supine, no pain  Long Term Goals: To be accomplished in  4  weeks:  1) Patient will be independent with comprehensive and updated HEP. Status at last note/certification: n/a  Current: update HEP closer to discharge  2) Patient will achieve +4 on GROC (on FOTO) to improve ambulating, ascending and descending stairs, functional mobility and transfers  Status at last note/certification: n/a  Current: reassess with FOTO at MD note  3) Increase FOTO to 60 indicating improved function and quality of life. Status at last note/certification: 49  Current: reassess at MD note  4) Patient will increase R hip strength in flexion, abduction, and extension to at least 4/5 so patient has improved ability to perform LE functional activities and ADLs.    Status at last note/certification: flexion: 3+/5, abduction 3-/5, hip extension: 3/5  Current: reassess closer to MD note    PLAN  [x]  Upgrade activities as tolerated     [x]  Continue plan of care  [x]  Update interventions per flow sheet       []  Discharge due to:_  []  Other:_      Sapna Andre, PT 11/19/2020  11:15 AM    Future Appointments   Date Time Provider Diana Burton   11/23/2020  1:30 PM [facility]   11/25/2020 12:00 PM Kaycee Vanessa [facility]   11/30/2020 12:00 PM Todd Chaves PT [facility]

## 2020-11-20 ENCOUNTER — APPOINTMENT (OUTPATIENT)
Dept: PHYSICAL THERAPY | Age: 75
End: 2020-11-20
Payer: MEDICARE

## 2020-11-20 ENCOUNTER — TRANSCRIBE ORDER (OUTPATIENT)
Dept: SCHEDULING | Age: 75
End: 2020-11-20

## 2020-11-20 DIAGNOSIS — M85.9 DISORDER OF BONE DENSITY AND STRUCTURE, UNSPECIFIED: Primary | ICD-10-CM

## 2020-11-20 DIAGNOSIS — Z78.0 MENOPAUSE: ICD-10-CM

## 2020-11-20 DIAGNOSIS — M85.80 OSTEOPENIA: ICD-10-CM

## 2020-11-20 DIAGNOSIS — Z12.31 SCREENING MAMMOGRAM, ENCOUNTER FOR: Primary | ICD-10-CM

## 2020-11-23 ENCOUNTER — HOSPITAL ENCOUNTER (OUTPATIENT)
Dept: PHYSICAL THERAPY | Age: 75
Discharge: HOME | End: 2020-11-23
Payer: MEDICARE

## 2020-11-23 PROCEDURE — 97110 THERAPEUTIC EXERCISES: CPT

## 2020-11-23 NOTE — PROGRESS NOTES
PT DAILY TREATMENT NOTE     Patient Name: Keyon Quezada  Date:2020  : 1945  [x]  Patient  Verified  Payor: VA MEDICARE / Plan: [place] MEDICARE PART A & B / Product Type: Medicare /    In time:132  Out time:211  Total Treatment Time (min): 39  Visit #: 5 of 8    Medicare/BCBS Only   Total Timed Codes (min):  39 1:1 Treatment Time:  39       Treatment Area: Pain in right hip [M25.551]    SUBJECTIVE  Pain Level (0-10 scale): 4  Any medication changes, allergies to medications, adverse drug reactions, diagnosis change, or new procedure performed?: [x] No    [] Yes (see summary sheet for update)  Subjective functional status/changes:   [] No changes reported  I can tell it's loosening up, but it still wakes me up at night.      OBJECTIVE    Modality rationale: Patient declined   Min Type Additional Details    [] Estim:  []Unatt       []IFC  []Premod                        []Other:  []w/ice   []w/heat  Position:  Location:    [] Estim: []Att    []TENS instruct  []NMES                    []Other:  []w/US   []w/ice   []w/heat  Position:  Location:    []  Traction: [] Cervical       []Lumbar                       [] Prone          []Supine                       []Intermittent   []Continuous Lbs:  [] before manual  [] after manual    []  Ultrasound: []Continuous   [] Pulsed                           []1MHz   []3MHz W/cm2:  Location:    []  Iontophoresis with dexamethasone         Location: [] Take home patch   [] In clinic    []  Ice     []  heat  []  Ice massage  []  Laser   []  Anodyne Position:  Location:    []  Laser with stim  []  Other:  Position:  Location:    []  Vasopneumatic Device Pressure:       [] lo [] med [] hi   Temperature: [] lo [] med [] hi   [] Skin assessment post-treatment:  []intact []redness- no adverse reaction    []redness  adverse reaction:     39 min Therapeutic Exercise:  [x] See flow sheet :   Rationale: increase ROM and increase strength to improve the patients ability to improve stand/amb tolerance, ease of transfers    With   [] TE   [] TA   [] neuro   [] other: Patient Education: [x] Review HEP    [] Progressed/Changed HEP based on:   [] positioning   [] body mechanics   [] transfers   [] heat/ice application    [] other:      Other Objective/Functional Measures: added band to sidelying clams     Pain Level (0-10 scale) post treatment: 3    ASSESSMENT/Changes in Function: Patient reports that she was able to walk two miles this weekend. She did have some pain, but did not have to stop to rest. Pain is deep in her hip joint with ambulation, as well as with hip strengthening activities. Patient will continue to benefit from skilled PT services to modify and progress therapeutic interventions, address functional mobility deficits, address ROM deficits, address strength deficits, analyze and address soft tissue restrictions, analyze and cue movement patterns, analyze and modify body mechanics/ergonomics, assess and modify postural abnormalities, address imbalance/dizziness and instruct in home and community integration to attain remaining goals. []  See Plan of Care  []  See progress note/recertification  []  See Discharge Summary         Progress towards goals / Updated goals:  1) Patient will be independent in performing daily initial home exercise program..  Status at last note/certification: n/a  Current: met; distributed HEP at first follow up session and patient is compliant   2) Increase R hip AROM flexion to 120 degrees to facilitate ambulation, LE's functional activities and ADLs.     Status at last note/certification: 85 deg (p!)  Current: met, 129 deg in supine, no pain  Long Term Goals: To be accomplished in  4  weeks:  1) Patient will be independent with comprehensive and updated HEP.    Status at last note/certification: n/a  Current: update HEP closer to discharge  2) Patient will achieve +4 on GROC (on FOTO) to improve ambulating, ascending and descending stairs, functional mobility and transfers  Status at last note/certification: n/a  Current: reassess with FOTO at MD note  3) Increase FOTO to 60 indicating improved function and quality of life. Status at last note/certification: 49  Current: reassess at MD note  4) Patient will increase R hip strength in flexion, abduction, and extension to at least 4/5 so patient has improved ability to perform LE functional activities and ADLs.    Status at last note/certification: flexion: 3+/5, abduction 3-/5, hip extension: 3/5  Current: reassess closer to MD note    PLAN  [x]  Upgrade activities as tolerated     [x]  Continue plan of care  []  Update interventions per flow sheet       []  Discharge due to:_  []  Other:_      Aneta Holm, PT 11/23/2020  1:34 PM    Future Appointments   Date Time Provider Diana Burton   11[facility]2020 12:00 [facility]   11/30/2020 12:00 PM Yony King, [facility]   2/1/2021 12:30 PM [facility] LORETTA RM 4 3D [facility]   2/1/2021  1:00 PM [facility] BONE DEXA RM 1 [facility]RBD [facility]

## 2020-11-25 ENCOUNTER — HOSPITAL ENCOUNTER (OUTPATIENT)
Dept: PHYSICAL THERAPY | Age: 75
Discharge: HOME | End: 2020-11-25
Payer: MEDICARE

## 2020-11-25 PROCEDURE — 97110 THERAPEUTIC EXERCISES: CPT

## 2020-11-25 NOTE — PROGRESS NOTES
PT DAILY TREATMENT NOTE     Patient Name: Jayson Ivan  Date:2020  : 1945  [x]  Patient  Verified  Payor: Rambo Horowitz / Plan: [place] OPT[employer] MEDICARE ADVANTAGE / Product Type: Managed Care Medicare /    In time:12:00  Out time: 12:38  Total Treatment Time (min): 38  Visit #: 6 of 8    Medicare/BCBS Only   Total Timed Codes (min):  38 1:1 Treatment Time:  38       Treatment Area: Pain in right hip [M25.551]    SUBJECTIVE  Pain Level (0-10 scale): 4  Any medication changes, allergies to medications, adverse drug reactions, diagnosis change, or new procedure performed?: [x] No    [] Yes (see summary sheet for update)  Subjective functional status/changes:   [] No changes reported  I feel like I'm more balanced. But when the right hip gets aggravated my gait is off and I  depend more ion the left side. OBJECTIVE    38 min Therapeutic Exercise:  [] See flow sheet :   Rationale: increase ROM and increase strength to improve the patients ability to increase standing/walking tolerance       With   [] TE   [] TA   [] neuro   [] other: Patient Education: [x] Review HEP    [] Progressed/Changed HEP based on:   [] positioning   [] body mechanics   [] transfers   [] heat/ice application    [] other:      Other Objective/Functional Measures:   Gains: feels that she is more balanced, less pain at times. Pain Level (0-10 scale) post treatment: 3    ASSESSMENT/Changes in Function:   Walking tolerance and stability are improving however the right hip more is more uncomfortable the left.     Patient will continue to benefit from skilled PT services to modify and progress therapeutic interventions, address functional mobility deficits, address ROM deficits, address strength deficits, analyze and address soft tissue restrictions, analyze and cue movement patterns, analyze and modify body mechanics/ergonomics, assess and modify postural abnormalities, address imbalance/dizziness and instruct in home and community integration to attain remaining goals. []  See Plan of Care  []  See progress note/recertification  []  See Discharge Summary         Progress towards goals / Updated goals:  1) Patient will be independent in performing daily initial home exercise program..  Status at last note/certification: n/a  Current: met; distributed HEP at first follow up session and patient is compliant   2) Increase R hip AROM flexion to 120 degrees to facilitate ambulation, LE's functional activities and ADLs.     Status at last note/certification: 85 deg (p!)  Current: met, 129 deg in supine, no pain  Long Term Goals: To be accomplished in  4  weeks:  1) Patient will be independent with comprehensive and updated HEP. Status at last note/certification: n/a  Current: update HEP closer to discharge  2) Patient will achieve +4 on GROC (on FOTO) to improve ambulating, ascending and descending stairs, functional mobility and transfers  Status at last note/certification: n/a  Current: reassess with FOTO at MD note  3) Increase FOTO to 60 indicating improved function and quality of life. Status at last note/certification: 49  Current: reassess at MD note  4) Patient will increase R hip strength in flexion, abduction, and extension to at least 4/5 so patient has improved ability to perform LE functional activities and ADLs.    Status at last note/certification: flexion: 3+/5, abduction 3-/5, hip extension: 3/5  Current: reassess closer to MD note    PLAN  [x]  Upgrade activities as tolerated     []  Continue plan of care  []  Update interventions per flow sheet       []  Discharge due to:_  []  Other:_      Thais Malik, TEE 11/25/2020  12:14 PM    Future Appointments   Date Time Provider Diana Burton   11/30/2020 12:00 PM Vannessa Anders PT [facility]   2/1/2021 12:30 PM [facility] RM 4 3D [facility]   2/1/2021  1:00 PM [facility] BONE DEXA RM 1 [facility]

## 2020-11-30 ENCOUNTER — HOSPITAL ENCOUNTER (OUTPATIENT)
Dept: PHYSICAL THERAPY | Age: 75
Discharge: HOME | End: 2020-11-30
Payer: MEDICARE

## 2020-11-30 PROCEDURE — 97530 THERAPEUTIC ACTIVITIES: CPT

## 2020-11-30 PROCEDURE — 97110 THERAPEUTIC EXERCISES: CPT

## 2020-11-30 NOTE — PROGRESS NOTES
PT DAILY TREATMENT NOTE     Patient Name: Norm Winters  Date:2020  : 1945  [x]  Patient  Verified  Payor: [employer] / [email or web address] / Product Type: Managed Care Medicare /    In time:1146  Out time:1231  Total Treatment Time (min): 45  Visit #: 7 of 8    Medicare/BCBS Only   Total Timed Codes (min):  45 1:1 Treatment Time:  45       Treatment Area: Pain in right hip [M25.551]    SUBJECTIVE  Pain Level (0-10 scale): 3-4  Any medication changes, allergies to medications, adverse drug reactions, diagnosis change, or new procedure performed?: [x] No    [] Yes (see summary sheet for update)  Subjective functional status/changes:   [] No changes reported  I can tell what we are doing here is helping because I feel it in my muscles more than my hip.     OBJECTIVE    Modality rationale:    Min Type Additional Details    [] Estim:  []Unatt       []IFC  []Premod                        []Other:  []w/ice   []w/heat  Position:  Location:    [] Estim: []Att    []TENS instruct  []NMES                    []Other:  []w/US   []w/ice   []w/heat  Position:  Location:    []  Traction: [] Cervical       []Lumbar                       [] Prone          []Supine                       []Intermittent   []Continuous Lbs:  [] before manual  [] after manual    []  Ultrasound: []Continuous   [] Pulsed                           []1MHz   []3MHz W/cm2:  Location:    []  Iontophoresis with dexamethasone         Location: [] Take home patch   [] In clinic    []  Ice     []  heat  []  Ice massage  []  Laser   []  Anodyne Position:  Location:    []  Laser with stim  []  Other:  Position:  Location:    []  Vasopneumatic Device Pressure:       [] lo [] med [] hi   Temperature: [] lo [] med [] hi   [] Skin assessment post-treatment:  []intact []redness- no adverse reaction    []redness  adverse reaction:     35 min Therapeutic Exercise:  [x] See flow sheet :   Rationale: increase ROM and increase strength to improve the patients ability to improve stand/amb tolerance, stair negotiation     10 min Therapeutic Activity:  [x]  See flow sheet :FOTO, goal reassessment   Rationale: increase strength, improve coordination and increase proprioception  to improve the patients ability to improve therapy outcomes      With   [] TE   [] TA   [] neuro   [] other: Patient Education: [x] Review HEP    [] Progressed/Changed HEP based on:   [] positioning   [] body mechanics   [] transfers   [] heat/ice application    [] other:      Other Objective/Functional Measures: see goals     Pain Level (0-10 scale) post treatment: 3    ASSESSMENT/Changes in Function: Patient reports improving LE strength since beginning therapy, assisting with ambulation and transfers. Her biggest objective limitation at this time is Right glute med weakness, which we will work to improve in the coming visits. Patient will continue to benefit from skilled PT services to modify and progress therapeutic interventions, address functional mobility deficits, address ROM deficits, address strength deficits, analyze and address soft tissue restrictions, analyze and cue movement patterns, analyze and modify body mechanics/ergonomics, assess and modify postural abnormalities, address imbalance/dizziness and instruct in home and community integration to attain remaining goals. []  See Plan of Care  []  See progress note/recertification  []  See Discharge Summary         Progress towards goals / Updated goals:  1) Patient will be independent in performing daily initial home exercise program..  Status at last note/certification: n/a  Current: met; distributed HEP at first follow up session and patient is compliant   2) Increase R hip AROM flexion to 120 degrees to facilitate ambulation, LE's functional activities and ADLs.     Status at last note/certification: 85 deg (p!)  Current: met, 129 deg in supine, no pain  Long Term Goals:  To be accomplished in  4  weeks:  1) Patient will be independent with comprehensive and updated HEP. Status at last note/certification: n/a  Current: update HEP closer to discharge  2) Patient will achieve +4 on GROC (on FOTO) to improve ambulating, ascending and descending stairs, functional mobility and transfers  Status at last note/certification: n/a  Current: met, 5  3) Increase FOTO to 60 indicating improved function and quality of life. Status at last note/certification: 49  Current: met, 63 pts  4) Patient will increase R hip strength in flexion, abduction, and extension to at least 4/5 so patient has improved ability to perform LE functional activities and ADLs.    Status at last note/certification: flexion: 3+/5, abduction 3-/5, hip extension: 3/5  Current: progressing, flexion 4+/5, extension 4+/5, abduction 3/5    PLAN  [x]  Upgrade activities as tolerated     [x]  Continue plan of care  []  Update interventions per flow sheet       []  Discharge due to:_  [x]  Other:_  Re-certification next visit    Ligia Bentley PT 11/30/2020  11:36 AM    Future Appointments   Date Time Provider Diana Burton   11/30/2020 12:00 PM Leslie Dorman, PT [facility]   12/3/2020  1:30 PM Sapna Andre, PT [facility]   12/7/2020  1:30 PM Latonya Wlid [facility]   12/10/2020  1:30 PM Jose Angel Gómez [facility]   2/1/2021 12:30 PM [facility] RM 4 3D [facility]   2/1/2021  1:00 PM [facility] BONE DEXA RM 1 [facility]

## 2020-12-03 ENCOUNTER — HOSPITAL ENCOUNTER (OUTPATIENT)
Dept: PHYSICAL THERAPY | Age: 75
Discharge: HOME | End: 2020-12-03
Payer: MEDICARE

## 2020-12-03 PROCEDURE — 97110 THERAPEUTIC EXERCISES: CPT

## 2020-12-03 PROCEDURE — 97112 NEUROMUSCULAR REEDUCATION: CPT

## 2020-12-03 NOTE — PROGRESS NOTES
[facility] YESENIA TICO CASTELLON [facility], [address] [place] [address]  [phone] fax    Continued Plan of Care/ Re-certification for Physical Therapy Services      Patient name: Seymour Aguilar Start of Care: 2020   Referral source: Lisa Cooper MD : 1945               Medical Diagnosis: Pain in right hip [M25.551]  Payor: [place] MED[place]ARE / Plan: [place] MEDICARE PART A & B / Product Type: Medicare /  Onset Date:  ~chronic, 2 years ago               Treatment Diagnosis: R hip pain   Prior Hospitalization: see medical history Provider#: [id number]   Medications: Verified on Patient summary List    Comorbidities: osteoporosis, arthritis, thyroid problems, asthma    Prior Level of Function: independent                          Visits from Start of Care: 8    Missed Visits: 0    The Plan of Care and following information is based on the patient's current status:    Short Term Goals: To be accomplished in 1 week:  1) Patient will be independent in performing daily initial home exercise program..  Status at last note/certification: n/a  Current: met; distributed HEP at first follow up session and patient is compliant   2) Increase R hip AROM flexion to 120 degrees to facilitate ambulation, LE's functional activities and ADLs.     Status at last note/certification: 85 deg (p!)  Current: met - hip flexion 129 deg in supine, no pain  Long Term Goals: To be accomplished in  4  weeks:  1) Patient will be independent with comprehensive and updated HEP. Status at last note/certification: n/a  Current: not yet met - update HEP closer to discharge  2) Patient will achieve +4 on GROC (on FOTO) to improve ambulating, ascending and descending stairs, functional mobility and transfers  Status at last note/certification: n/a  Current: met - 5 scored  3) Increase FOTO to 60 indicating improved function and quality of life.    Status at last note/certification: 49  Current: met - FOTO 63 pts  4) Patient will increase R hip strength in flexion, abduction, and extension to at least 4/5 so patient has improved ability to perform LE functional activities and ADLs.    Status at last note/certification: flexion: 3+/5, abduction 3-/5, hip extension: 3/5  Current: progressing - flexion 4+/5, extension 4+/5, abduction 3/5    Key functional changes:  Pt has attended skilled therapy for initial evaluation and 7 treatment sessions to address right hip pain and weakness. Pt reports improved LE strength since start of care, assisting with improvements in transfers and ambulation. Pt continues to exhibit gluteus medius weakness and resultant difficulty with stair negotiation. Problems/ barriers to goal attainment: None     Problem List: pain affecting function, decrease ROM, decrease strength, edema affecting function, impaired gait/ balance, decrease ADL/ functional abilitiies, decrease activity tolerance, decrease flexibility/ joint mobility and decrease transfer abilities    Treatment Plan: Therapeutic exercise, Therapeutic activities, Neuromuscular re-education, Physical agent/modality, Gait/balance training, Manual therapy, Aquatic therapy, Patient education, Functional mobility training and Stair training    Patient Goal (s) has been updated and includes: \"Improved mobility, walking, and ability to do the stairs with ease. \"     Goals for this certification period to be accomplished in 4 weeks:  Goal: Patient will be independent with comprehensive and updated HEP. Status at last note/certification: will update HEP closer to discharge  Current:   Goal: Patient will increase R hip strength in flexion, abduction, and extension to at least 4/5 so patient has improved ability to perform LE functional activities and ADLs.    Status at last note/certification: flexion 4+/5, extension 4+/5, abduction 3/5  Current:   Goal: Patient to negotiate at least 4 steps with reciprocal pattern without deviations or right hip discomfort for ease of negotiating stairs in home and community. Status at last note/certification: difficulty with stairs  Current:    Frequency / Duration: Patient to be seen 2 times per week for 4 weeks:    Assessment / Recommendations: Pt would benefit from continued skilled therapy to improve right hip strength and stability to increase ease of all transfers, standing/amb activities with minimal to no pain. Certification Period: 12/4/20 - 01/02/21    Sonali MORA Jes, PT 12/3/2020 1:38 PM    ________________________________________________________________________  I certify that the above Therapy Services are being furnished while the patient is under my care. I agree with the treatment plan and certify that this therapy is necessary. [] I have read the above and request that my patient continue as recommended.   [] I have read the above report and request that my patient continue therapy with the following changes/special instructions: ______________________________________  [] I have read the above report and request that my patient be discharged from therapy    Physician's Signature:____________Date:_________TIME:________    ** Signature, Date and Time must be completed for valid certification **    Please sign and return to [facility] YESENIA CASTELLON [address]  [phone] fax

## 2020-12-03 NOTE — PROGRESS NOTES
PT DAILY TREATMENT NOTE     Patient Name: Myrtle Iyer  Date:12/3/2020  : 1945  [x]  Patient  Verified  Payor: Joya Carr / Plan: [facility] [employer]ICARE ADVANTAGE / Product Type: Managed Care Medicare /    In time:1:30  Out time:2:12  Total Treatment Time (min): 42  Visit #: 8 of 8    Medicare/BCBS Only   Total Timed Codes (min):  42 1:1 Treatment Time:  42       Treatment Area: Pain in right hip [M25.551]    SUBJECTIVE  Pain Level (0-10 scale): 3/10  Any medication changes, allergies to medications, adverse drug reactions, diagnosis change, or new procedure performed?: [x] No    [] Yes (see summary sheet for update)  Subjective functional status/changes:   [] No changes reported  \"It's getting better. I am feeling more muscle soreness than joint pain now. So, its working. \"    OBJECTIVE    32 min Therapeutic Exercise:  [] See flow sheet :   Rationale: increase ROM and increase strength to improve the patients ability to increase ease of transfers, standing/amb tolerance    10 min Neuromuscular Re-education:  []  See flow sheet :   Rationale: increase strength and improve coordination  to improve the patients ability to increase glute strength and stability to increase ease of stair negotiation          With   [] TE   [] TA   [] neuro   [] other: Patient Education: [x] Review HEP    [] Progressed/Changed HEP based on:   [] positioning   [] body mechanics   [] transfers   [] heat/ice application    [] other:      Other Objective/Functional Measures: Continued with exercises per flow sheet. Pain Level (0-10 scale) post treatment: 3/10    ASSESSMENT/Changes in Function: Pt noted some onset of low back pain that she attributed to vacuuming yesterday. As a result, Pt only did about 5 minisquats, but notes this exercise does not normally cause this type of pain.     Patient will continue to benefit from skilled PT services to address functional mobility deficits, address ROM deficits, address strength deficits, analyze and address soft tissue restrictions, analyze and cue movement patterns, analyze and modify body mechanics/ergonomics, assess and modify postural abnormalities, address imbalance/dizziness and instruct in home and community integration to attain remaining goals. []  See Plan of Care  [x]  See progress note/recertification  []  See Discharge Summary         Progress towards goals / Updated goals:  Short Term Goals: To be accomplished in 1 week:  1) Patient will be independent in performing daily initial home exercise program..  Status at last note/certification: n/a  Current: met; distributed HEP at first follow up session and patient is compliant   2) Increase R hip AROM flexion to 120 degrees to facilitate ambulation, LE's functional activities and ADLs.     Status at last note/certification: 85 deg (p!)  Current: met - hip flexion 129 deg in supine, no pain  Long Term Goals: To be accomplished in  4  weeks:  1) Patient will be independent with comprehensive and updated HEP. Status at last note/certification: n/a  Current: not yet met - update HEP closer to discharge  2) Patient will achieve +4 on GROC (on FOTO) to improve ambulating, ascending and descending stairs, functional mobility and transfers  Status at last note/certification: n/a  Current: met - 5 scored  3) Increase FOTO to 60 indicating improved function and quality of life. Status at last note/certification: 49  Current: met - FOTO 63 pts  4) Patient will increase R hip strength in flexion, abduction, and extension to at least 4/5 so patient has improved ability to perform LE functional activities and ADLs.    Status at last note/certification: flexion: 3+/5, abduction 3-/5, hip extension: 3/5  Current: progressing - flexion 4+/5, extension 4+/5, abduction 3/5    PLAN  [x]  Upgrade activities as tolerated     [x]  Continue plan of care  []  Update interventions per flow sheet       []  Discharge due to:_  [] Other:_      Serge Isaiah Andre, PT 12/3/2020  1:36 PM    Future Appointments   Date Time Provider Diana Burton   12/7/2020  1:30 PM Rhonda Ridley [facility]   12/10/2020  1:30 PM Castro Melendez [facility]   2/1/2021 12:30 PM [facility] RM 4 3D [facility]   2/1/2021  1:00 PM HBV BONE DEXA RM 1 [facility]

## 2020-12-07 ENCOUNTER — HOSPITAL ENCOUNTER (OUTPATIENT)
Dept: PHYSICAL THERAPY | Age: 75
Discharge: HOME | End: 2020-12-07
Payer: MEDICARE

## 2020-12-07 PROCEDURE — 97112 NEUROMUSCULAR REEDUCATION: CPT

## 2020-12-07 PROCEDURE — 97110 THERAPEUTIC EXERCISES: CPT

## 2020-12-07 NOTE — PROGRESS NOTES
PT DAILY TREATMENT NOTE     Patient Name: William Fay  Date:2020  : 1945  [x]  Patient  Verified  Payor: [employer] / Plan: [facility] / Product Type: Managed Care Medicare /    In time:1:30  Out time:2:10  Total Treatment Time (min): 40  Visit #: 1 of 8    Medicare/BCBS Only   Total Timed Codes (min):  40 1:1 Treatment Time:  40       Treatment Area: Pain in right hip [M25.551]    SUBJECTIVE  Pain Level (0-10 scale): 3  Any medication changes, allergies to medications, adverse drug reactions, diagnosis change, or new procedure performed?: [x] No    [] Yes (see summary sheet for update)  Subjective functional status/changes:   [] No changes reported  Pt reports feeling about the same    OBJECTIVE    30 min Therapeutic Exercise:  [x] See flow sheet :   Rationale: increase ROM and increase strength to improve the patients ability to perform ADLs    10 min Neuromuscular Re-education:  [x]  See flow sheet :   Rationale: increase strength, improve coordination, improve balance and increase proprioception  to improve the patients ability to perform functional tasks            With   [] TE   [] TA   [] neuro   [] other: Patient Education: [x] Review HEP    [] Progressed/Changed HEP based on:   [] positioning   [] body mechanics   [] transfers   [] heat/ice application    [] other:      Other Objective/Functional Measures:   incr'd reps per flowsheet  Added SLS     Pain Level (0-10 scale) post treatment: 3    ASSESSMENT/Changes in Function: Challenged with SL stability on Right LE, req's several uses of kael HR to maintain balance. Reports incr'd ease with standing from chair and right hip feels stronger. No c/o incr'd pain following treatment but reports ms fatigue.     Patient will continue to benefit from skilled PT services to modify and progress therapeutic interventions, address functional mobility deficits, address ROM deficits, address strength deficits, analyze and address soft tissue restrictions, analyze and cue movement patterns, analyze and modify body mechanics/ergonomics and assess and modify postural abnormalities to attain remaining goals. []  See Plan of Care  []  See progress note/recertification  []  See Discharge Summary         Progress towards goals / Updated goals:  Goals for this certification period to be accomplished in 4 weeks:  Goal: Patient will be independent with comprehensive and updated HEP. Status at last note/certification: will update HEP closer to discharge  Current:   Goal: Patient will increase R hip strength in flexion, abduction, and extension to at least 4/5 so patient has improved ability to perform LE functional activities and ADLs.    Status at last note/certification: flexion 4+/5, extension 4+/5, abduction 3/5  Current:   Goal: Patient to negotiate at least 4 steps with reciprocal pattern without deviations or right hip discomfort for ease of negotiating stairs in home and community.   Status at last note/certification: difficulty with stairs  Current:       PLAN  []  Upgrade activities as tolerated     [x]  Continue plan of care  []  Update interventions per flow sheet       []  Discharge due to:_  []  Other:_      Altamese Like, PTA 12/7/2020  1:34 PM    Future Appointments   Date Time Provider Diana Burton   12/10/2020  1:30 PM Steven Marcano   2/1/2021 12:30 PM [facility] RM 4 3D [facility]   2/1/2021  1:00 PM [facility] BONE DEXA RM 1 [facility]

## 2020-12-10 ENCOUNTER — HOSPITAL ENCOUNTER (OUTPATIENT)
Dept: PHYSICAL THERAPY | Age: 75
Discharge: HOME | End: 2020-12-10
Payer: MEDICARE

## 2020-12-10 PROCEDURE — 97112 NEUROMUSCULAR REEDUCATION: CPT

## 2020-12-10 PROCEDURE — 97110 THERAPEUTIC EXERCISES: CPT

## 2020-12-10 NOTE — PROGRESS NOTES
PT DAILY TREATMENT NOTE 10-18    Patient Name: Taye Brown  Date:12/10/2020  : 1945  [x]  Patient  Verified  Payor: [employer] MEDICARE / Plan: [place] [employer] MEDICARE ADVANTAGE / Product Type: Managed Care Medicare /    In time:1:31  Out time:2:16  Total Treatment Time (min): 45  Visit #: 2 of 8    Medicare/BCBS Only   Total Timed Codes (min):  45 1:1 Treatment Time:  42       Treatment Area: Pain in right hip [M25.551]    SUBJECTIVE  Pain Level (0-10 scale): 3  Any medication changes, allergies to medications, adverse drug reactions, diagnosis change, or new procedure performed?: [x] No    [] Yes (see summary sheet for update)  Subjective functional status/changes:   [] No changes reported  \"I still have some pain in my right quadriceps and right shin, it feels like the muscles are cramping there. \"    OBJECTIVE    27 min Therapeutic Exercise:  [x] See flow sheet :   Rationale: increase ROM and increase strength to improve the patients ability to perform gait and transfers with improved LE strength and mobility. 18 min Neuromuscular Re-education:  [x]  See flow sheet :   Rationale: increase strength, improve coordination, improve balance and increase proprioception  to improve the patients ability to perform stair negotiation and functional mobility in the home/community with improved quadriceps control and decreased falls risk. With   [] TE   [] TA   [x] neuro   [] other: Patient Education: [x] Review HEP    [] Progressed/Changed HEP based on:   [] positioning   [] body mechanics   [] transfers   [] heat/ice application    [] other:      Other Objective/Functional Measures: -Initiated Pilates chair/reformer interventions     Pain Level (0-10 scale) post treatment: 3    ASSESSMENT/Changes in Function: Verbal cues provided throughout newly added Pilates interventions to improved gluteal recruitment and relaxation.     Patient will continue to benefit from skilled PT services to modify and progress therapeutic interventions, address functional mobility deficits, address ROM deficits, address strength deficits, analyze and address soft tissue restrictions, analyze and cue movement patterns, analyze and modify body mechanics/ergonomics, assess and modify postural abnormalities and address imbalance/dizziness to attain remaining goals. []  See Plan of Care  []  See progress note/recertification  []  See Discharge Summary         Progress towards goals / Updated goals:  Goal: Patient will be independent with comprehensive and updated HEP. Status at last note/certification: will update HEP closer to discharge  Current: reassess closer to MD note   Goal: Patient will increase R hip strength in flexion, abduction, and extension to at least 4/5 so patient has improved ability to perform LE functional activities and ADLs.    Status at last note/certification: flexion 4+/5, extension 4+/5, abduction 3/5  Current:   Goal: Patient to negotiate at least 4 steps with reciprocal pattern without deviations or right hip discomfort for ease of negotiating stairs in home and community.   Status at last note/certification: difficulty with stairs  Current:    PLAN  [x]  Upgrade activities as tolerated     [x]  Continue plan of care  []  Update interventions per flow sheet       []  Discharge due to:_  []  Other:_      Nestor Level 12/10/2020  10:18 AM    Future Appointments   Date Time Provider Diana Burton   12/10/2020  1:30 PM [facility]   2/1/2021 12:30 PM [facility] RM 4 3D [facility]   2/1/2021  1:00 PM HBV BONE DEXA RM 1 [facility]

## 2020-12-15 ENCOUNTER — APPOINTMENT (OUTPATIENT)
Dept: PHYSICAL THERAPY | Age: 75
End: 2020-12-15
Payer: MEDICARE

## 2020-12-15 NOTE — PROGRESS NOTES
[facility] YESENIA TICO [facility]  [address] [place], [address] [place] [address]  [phone] fax    Discharge Summary      Patient Margaret Bloch Start of Care: 11/11/2020   Referral source: Jhony Parks MD LEDY: [id number]               Medical Diagnosis: Pain in right hip [M25.551]  Payor: [place] MEDICARE / Plan: [place] MEDICARE PART A & B / Product Type: Medicare /  Onset Date:  ~chronic, 2 years ago               Treatment Diagnosis: R hip pain   Prior Hospitalization: see medical history Provider#: [id number]   Medications: Verified on Patient summary List    Comorbidities: osteoporosis, arthritis, thyroid problems, asthma    Prior Level of Function: independent             Visits from Start of Care: 10    Missed Visits: 0  Reporting Period : 12/3/2020 to 12/15/2020    Goal: Patient will be independent with comprehensive and updated HEP. Status at last note/certification: unable to reassess   Status at discharge: not met    Goal:  Patient will increase R hip strength in flexion, abduction, and extension to at least 4/5 so patient has improved ability to perform LE functional activities and ADLs. Status at last note/certification: unable to reassess   Status at discharge: not met    Goal: Patient to negotiate at least 4 steps with reciprocal pattern without deviations or right hip discomfort for ease of negotiating stairs in home and community. Status at last note/certification: unable to reassess   Status at discharge: not met    Assessment/ Summary of Care:   Pt was seen for 10 therapy sessions. Per telephone log, the pt requested d/c from therapy secondary to someone on the floor of her apartment building testing positive for COVID-19. Pt is therefore d/c'ed from therapy and unable to reassess goals at this time.      RECOMMENDATIONS:  [x]Discontinue therapy: []Patient has reached or is progressing toward set goals      [x]Patient is non-compliant or has abdicated      []Due to lack of appreciable progress towards set goals    Oliver Felix, PT 12/15/2020 2:50 PM

## 2020-12-17 ENCOUNTER — APPOINTMENT (OUTPATIENT)
Dept: PHYSICAL THERAPY | Age: 75
End: 2020-12-17
Payer: MEDICARE

## 2020-12-21 ENCOUNTER — APPOINTMENT (OUTPATIENT)
Dept: PHYSICAL THERAPY | Age: 75
End: 2020-12-21
Payer: MEDICARE

## 2020-12-23 ENCOUNTER — APPOINTMENT (OUTPATIENT)
Dept: PHYSICAL THERAPY | Age: 75
End: 2020-12-23
Payer: MEDICARE

## 2020-12-28 ENCOUNTER — APPOINTMENT (OUTPATIENT)
Dept: PHYSICAL THERAPY | Age: 75
End: 2020-12-28
Payer: MEDICARE

## 2020-12-30 ENCOUNTER — APPOINTMENT (OUTPATIENT)
Dept: PHYSICAL THERAPY | Age: 75
End: 2020-12-30
Payer: MEDICARE

## 2021-01-06 ENCOUNTER — HOSPITAL ENCOUNTER (OUTPATIENT)
Dept: PHYSICAL THERAPY | Age: 76
Discharge: HOME | End: 2021-01-06
Payer: MEDICARE

## 2021-01-06 PROCEDURE — 97162 PT EVAL MOD COMPLEX 30 MIN: CPT

## 2021-01-06 PROCEDURE — 97530 THERAPEUTIC ACTIVITIES: CPT

## 2021-01-06 NOTE — PROGRESS NOTES
[facility] YESENIA CASTELLON [facility], [address] [place] [address]  [phone] fax  Plan of Care/ Statement of Necessity for Physical Therapy Services    Patient name: Mi Womack Start of Care: 2021   Referral source: Shikha Banuelos MD : 1945    Medical Diagnosis: Pain in right hip [M25.551]  Payor: Gurdeep Pelayo / Plan: [facility] / Product Type: Managed Care Medicare /  Onset Date:2020    Treatment Diagnosis: Right Hip Pain   Prior Hospitalization: see medical history Provider#: [id number]   Medications: Verified on Patient summary List    Comorbidities: osteoporosis, arthritis, thyroid problems, asthma    Prior Level of Function: lives in [place]; functionally independent with pain      The Plan of Care and following information is based on the information from the initial evaluation. Assessment/ key information: Pt is a 76 y.o. female who presents with c/o right hip pain and LE weakness that has persisted x two years due to presence of OA. Pt has increased pain with walking longer distances of > 1 mile max, negotiating stairs into condo building, getting in/out of car or bathtub, and notes some instability with balance. Upon exam, Pt exhibited palpable tenderness along right hip joint, 4/5 left hip strength grossly, and instability with squatting, SLS. Pt would benefit from skilled PT to address above deficits to improve Pt's function with less pain. Evaluation Complexity History MEDIUM  Complexity : 1-2 comorbidities / personal factors will impact the outcome/ POC ; Examination MEDIUM Complexity : 3 Standardized tests and measures addressing body structure, function, activity limitation and / or participation in recreation  ;Presentation MEDIUM Complexity : Evolving with changing characteristics  ; Clinical Decision Making MEDIUM Complexity : FOTO score of 26-74  Overall Complexity Rating: MEDIUM  Problem List: pain affecting function, decrease ROM, decrease strength, edema affecting function, impaired gait/ balance, decrease ADL/ functional abilitiies, decrease activity tolerance, decrease flexibility/ joint mobility and decrease transfer abilities   Treatment Plan may include any combination of the following: Therapeutic exercise, Therapeutic activities, Neuromuscular re-education, Physical agent/modality, Gait/balance training, Manual therapy, Patient education, Self Care training, Functional mobility training and Stair training  Patient / Family readiness to learn indicated by: asking questions, trying to perform skills and interest  Persons(s) to be included in education: patient (P)  Barriers to Learning/Limitations: None  Patient Goal (s): Lessen pain - strengthen muscles.   Patient Self Reported Health Status: good  Rehabilitation Potential: good    Short Term Goals: To be accomplished in 1 weeks:  Goal: Pt to continue compliance with current HEP to improve right hip strength and stability. Status at last note/certification: Established and reviewed with Pt  Long Term Goals: To be accomplished in 4 weeks:  Goal: Pt to increase right hip strength to 4+/5 grossly to negotiate 5-6 stairs with 1 UE support and no deviations to get into condo building. Status at last note/certification: 4/5 grossly  Goal: Pt to perform B SLS x 30 seconds without pain to increase ease of ambulation, stair negotiation. Status at last note/certification: 13 seconds right with pain, 15 seconds left  Goal: Pt to perform 10 squats without pain or UE support to work towards increase ease of getting in/out of car, bathtub. Status at last note/certification: pain and weakness in LEs  Goal: Pt to report < 4/10 pain at worst to increase ease with getting in/out of car, bathtub. Status at last note/certification: 1/79 pain at worst  Goal: Pt to report FOTO score of 63 pts to show improved function and quality of life.   Status at last note/certification: FOTO 56 pts     Frequency / Duration: Patient to be seen 2 times per week for 4 weeks. Patient/ Caregiver education and instruction: Diagnosis, prognosis, exercises   [x]  Plan of care has been reviewed with PTA    Certification Period: 1/6/21 - 2/4/21  Sonali Andre, PT 1/6/2021 1:28 PM  _____________________________________________________________________  I certify that the above Therapy Services are being furnished while the patient is under my care. I agree with the treatment plan and certify that this therapy is necessary.     Physician's Signature:____________Date:_________TIME:________    ** Signature, Date and Time must be completed for valid certification **    Please sign and return to [facility] YESENIA CASTELLON [facility], [address] [place] [address]  [phone] fax

## 2021-01-06 NOTE — PROGRESS NOTES
PT DAILY TREATMENT NOTE     Patient Name: Jerman Petty  Date:2021  : 1945  [x]  Patient  Verified  Payor: [employer] / Plan: [place] [employer] MEDICARE ADVANTAGE / Product Type: Managed Care Medicare /    In time:1:35  Out time:2:10  Total Treatment Time (min): 35  Visit #: 1 of 8    Medicare/BCBS Only   Total Timed Codes (min):  16 1:1 Treatment Time:  35       Treatment Area: Pain in right hip [M25.551]    SUBJECTIVE  Pain Level (0-10 scale): 210  Any medication changes, allergies to medications, adverse drug reactions, diagnosis change, or new procedure performed?: [x] No    [] Yes (see summary sheet for update)  Subjective functional status/changes:   [] No changes reported  See paper initial evaluation note. OBJECTIVE    19 min [x]Eval                  []Re-Eval     8 min Therapeutic Activity:  []  See flow sheet : diagnosis; prognosis; current HEP reviewed with Pt   Rationale: increase ROM and increase strength  to improve the patients ability to increase standing/amb tolerance    8 min Manual Therapy:  MET leg pull to correct right ilial upslip   The manual therapy interventions were performed at a separate and distinct time from the therapeutic activities interventions.   Rationale: decrease pain and increase ROM to normalize gait and reduce hip pain          With   [] TE   [x] TA   [] neuro   [] other: Patient Education: [x] Review HEP    [] Progressed/Changed HEP based on:   [] positioning   [] body mechanics   [] transfers   [] heat/ice application    [] other:      Other Objective/Functional Measures: FOTO 56 pts     Pain Level (0-10 scale) post treatment: 10    ASSESSMENT/Changes in Function:     Patient will continue to benefit from skilled PT services to address functional mobility deficits, address ROM deficits, address strength deficits, analyze and address soft tissue restrictions, analyze and cue movement patterns, analyze and modify body mechanics/ergonomics, assess and modify postural abnormalities, address imbalance/dizziness, and instruct in home and community integration to attain remaining goals. [x]  See Plan of Care  []  See progress note/recertification  []  See Discharge Summary         Progress towards goals / Updated goals:  See plan of care.     PLAN  []  Upgrade activities as tolerated     [x]  Continue plan of care  []  Update interventions per flow sheet       []  Discharge due to:_  []  Other:_      Zhanna Andre, PT 1/6/2021  1:28 PM    Future Appointments   Date Time Provider Diana Burton   1/6/2021  1:30 PM Zhanna Andre, PT [facility]   2/1/2021 12:30 PM [facility] LORETTA RM 4 3D HBVRMAM [facility]   2/1/2021  1:00 PM HBV BONE DEXA RM 1 HBVRBD HBV

## 2021-01-11 ENCOUNTER — HOSPITAL ENCOUNTER (OUTPATIENT)
Dept: PHYSICAL THERAPY | Age: 76
Discharge: HOME | End: 2021-01-11
Payer: MEDICARE

## 2021-01-11 PROCEDURE — 97112 NEUROMUSCULAR REEDUCATION: CPT

## 2021-01-11 PROCEDURE — 97110 THERAPEUTIC EXERCISES: CPT

## 2021-01-11 PROCEDURE — 97530 THERAPEUTIC ACTIVITIES: CPT

## 2021-01-11 NOTE — PROGRESS NOTES
PT DAILY TREATMENT NOTE     Patient Name: Balnco Subramanian  Date:2021  : 1945  [x]  Patient  Verified  Payor: Xiao Lovelace / Plan: [employer] / Product Type: Managed Care Medicare /    In time:1:36  Out time:2:15  Total Treatment Time (min): 39  Visit #: 2 of 8    Medicare/BCBS Only   Total Timed Codes (min):  39 1:1 Treatment Time:  39       Treatment Area: Pain in right hip [M25.551]    SUBJECTIVE  Pain Level (0-10 scale): 10  Any medication changes, allergies to medications, adverse drug reactions, diagnosis change, or new procedure performed?: [x] No    [] Yes (see summary sheet for update)  Subjective functional status/changes:   [] No changes reported  \"The hip has been feeling pretty good. \"    OBJECTIVE    8 min Therapeutic Exercise:  [] See flow sheet :   Rationale: increase ROM and increase strength to improve the patients ability to increase of standing/amb, stair negotiation     8 min Therapeutic Activity:  []  See flow sheet :   Rationale: increase strength and improve coordination  to improve the patients ability to increase ease of squatting, stooping and stair negotiation    23 min Neuromuscular Re-education:  []  See flow sheet : Pilates hip stabilization   Rationale: increase strength, improve coordination and increase proprioception  to improve the patients ability to improve hip mobility and stability for ease of standing activities          With   [] TE   [] TA   [] neuro   [] other: Patient Education: [x] Review HEP    [] Progressed/Changed HEP based on:   [] positioning   [] body mechanics   [] transfers   [] heat/ice application    [] other:      Other Objective/Functional Measures: Initiated treatment program per flow sheet. Pt given verbal and demonstrative cueing for proper technique of all exercises.     Pain Level (0-10 scale) post treatment: 2/10    ASSESSMENT/Changes in Function: Pt able to perform all exercise interventions but challenged with step ups/downs and sidestepping due to right hip weakness and mild discomfort. Pt noted no change in pain level after session, only muscle soreness. Pt declined modalities to do at home as needed. Patient will continue to benefit from skilled PT services to address functional mobility deficits, address ROM deficits, address strength deficits, analyze and address soft tissue restrictions, analyze and cue movement patterns, analyze and modify body mechanics/ergonomics, assess and modify postural abnormalities, address imbalance/dizziness and instruct in home and community integration to attain remaining goals. []  See Plan of Care  []  See progress note/recertification  []  See Discharge Summary         Progress towards goals / Updated goals:  Short Term Goals: To be accomplished in 1 weeks:  Goal: Pt to continue compliance with current HEP to improve right hip strength and stability. Status at last note/certification: Established and reviewed with Pt  Current status: met - Pt reports compliance with HEP  Long Term Goals: To be accomplished in 4 weeks:  Goal: Pt to increase right hip strength to 4+/5 grossly to negotiate 5-6 stairs with 1 UE support and no deviations to get into condo building. Status at last note/certification: 4/5 grossly  Current status:  Goal: Pt to perform B SLS x 30 seconds without pain to increase ease of ambulation, stair negotiation. Status at last note/certification: 13 seconds right with pain, 15 seconds left  Current status:  Goal: Pt to perform 10 squats without pain or UE support to work towards increase ease of getting in/out of car, bathtub. Status at last note/certification: pain and weakness in LEs  Current status:  Goal: Pt to report < 4/10 pain at worst to increase ease with getting in/out of car, bathtub.   Status at last note/certification: 5/83 pain at worst  Current status:  Goal: Pt to report FOTO score of 63 pts to show improved function and quality of life.   Status at last note/certification: FOTO 56 pts   Current status: reassess at MD note    PLAN  [x]  Upgrade activities as tolerated     [x]  Continue plan of care  []  Update interventions per flow sheet       []  Discharge due to:_  []  Other:_      Shalom Andre, PT 1/11/2021  1:39 PM    Future Appointments   Date Time Provider Diana Burton   1/14/2021  1:30 PM [facility]   1/18/2021  1:30 PM [facility]   1/21/2021  1:30 PM [facility]   1/25/2021  1:30 PM Shalom Andre, PT [facility]   1/28/2021  1:30 PM [facility]   2/1/2021 12:30 PM [facility] LORETTA RM 4 3D HBVRMAM HBV   2/1/2021  1:00 PM HBV BONE DEXA RM 1 HBVRBD HBV

## 2021-01-14 ENCOUNTER — HOSPITAL ENCOUNTER (OUTPATIENT)
Dept: PHYSICAL THERAPY | Age: 76
Discharge: HOME | End: 2021-01-14
Payer: MEDICARE

## 2021-01-14 PROCEDURE — 97112 NEUROMUSCULAR REEDUCATION: CPT

## 2021-01-14 PROCEDURE — 97110 THERAPEUTIC EXERCISES: CPT

## 2021-01-14 NOTE — PROGRESS NOTES
PT DAILY TREATMENT NOTE     Patient Name: Kayla Armstrong  Date:2021  : 1945  [x]  Patient  Verified  Payor: Raul Fung / Plan: [facility] / Product Type: Managed Care Medicare /    In time:1:30  Out time:2:10  Total Treatment Time (min): 40  Visit #: 3 of 8    Medicare/BCBS Only   Total Timed Codes (min):  40 1:1 Treatment Time:  40       Treatment Area: Pain in right hip [M25.551]    SUBJECTIVE  Pain Level (0-10 scale): 2  Any medication changes, allergies to medications, adverse drug reactions, diagnosis change, or new procedure performed?: [x] No    [] Yes (see summary sheet for update)  Subjective functional status/changes:   [] No changes reported  The hip is feeling stronger and less pain    OBJECTIVE    8 min Therapeutic Exercise:  [] See flow sheet :   Rationale: increase ROM and increase strength to improve the patients ability to improve ease of ambulation, functional tasks       32 min Neuromuscular Re-education:  []  See flow sheet :   Rationale: increase strength, improve coordination, improve balance and increase proprioception  to improve the patients ability to increase hip stability for stair negotiation, transfers          With   [] TE   [] TA   [] neuro   [] other: Patient Education: [x] Review HEP    [] Progressed/Changed HEP based on:   [] positioning   [] body mechanics   [] transfers   [] heat/ice application    [] other:      Other Objective/Functional Measures:      Pain Level (0-10 scale) post treatment: 2 amb    ASSESSMENT/Changes in Function: daily use of floor pedals at home for mobility. Unable to perform feet in straps due to issues with LB pain and stability.  Pain in hip with step downs and increased activation of stabilizers    Patient will continue to benefit from skilled PT services to modify and progress therapeutic interventions, address functional mobility deficits, address ROM deficits, address strength deficits, analyze and address soft tissue restrictions, analyze and cue movement patterns, analyze and modify body mechanics/ergonomics, assess and modify postural abnormalities, address imbalance/dizziness and instruct in home and community integration to attain remaining goals. []  See Plan of Care  []  See progress note/recertification  []  See Discharge Summary         Progress towards goals / Updated goals:  Goal: Pt to continue compliance with current HEP to improve right hip strength and stability. Status at last note/certification: Established and reviewed with Pt  Current status: met - Pt reports compliance with HEP  Long Term Goals: To be accomplished in 4 weeks:  Goal: Pt to increase right hip strength to 4+/5 grossly to negotiate 5-6 stairs with 1 UE support and no deviations to get into condo building. Status at last note/certification: 4/5 grossly  Current status:  Goal: Pt to perform B SLS x 30 seconds without pain to increase ease of ambulation, stair negotiation. Status at last note/certification: 13 seconds right with pain, 15 seconds left  Current status:  Goal: Pt to perform 10 squats without pain or UE support to work towards increase ease of getting in/out of car, bathtub. Status at last note/certification: pain and weakness in LEs  Current status:  Goal: Pt to report < 4/10 pain at worst to increase ease with getting in/out of car, bathtub. Status at last note/certification: 8/10 pain at worst  Current status:  Goal: Pt to report FOTO score of 63 pts to show improved function and quality of life.   Status at last note/certification: KELLY 70 EMD   Current status: reassess at MD note    PLAN  [x]  Upgrade activities as tolerated     []  Continue plan of care  []  Update interventions per flow sheet       []  Discharge due to:_  []  Other:_      Woo Ulloa, PTA 1/14/2021  1:36 PM    Future Appointments   Date Time Provider Diana Burton   1/18/2021  1:30 PM Constantine Minder [facility]   1/21/2021 1:30 PM Wilmer Tripathi [facility]   1/25/2021  1:30 PM Brennon Andre, [facility]   1/28/2021  1:30 PM [place] Brett2 [address]   2/1/2021 12:30 PM HBV LORETTA RM 4 3D [facility] HBV   2/1/2021  1:00 PM HBV BONE DEXA RM 1 HBVRBD HBV

## 2021-01-18 ENCOUNTER — HOSPITAL ENCOUNTER (OUTPATIENT)
Dept: PHYSICAL THERAPY | Age: 76
Discharge: HOME | End: 2021-01-18
Payer: MEDICARE

## 2021-01-18 PROCEDURE — 97110 THERAPEUTIC EXERCISES: CPT

## 2021-01-18 PROCEDURE — 97112 NEUROMUSCULAR REEDUCATION: CPT

## 2021-01-18 NOTE — PROGRESS NOTES
PT DAILY TREATMENT NOTE     Patient Name: Ra Sanchez  Date:2021  : 1945  [x]  Patient  Verified  Payor: Ricardo Reid / Plan: [employer] / Product Type: Managed Care Medicare /    In time:1:30  Out time:2:00  Total Treatment Time (min): 40  Visit #: 4 of 8      Medicare/BCBS Only   Total Timed Codes (min):  40 1:1 Treatment Time:  40       Treatment Area: Pain in right hip [M25.551]    SUBJECTIVE  Pain Level (0-10 scale): 2  Any medication changes, allergies to medications, adverse drug reactions, diagnosis change, or new procedure performed?: [x] No    [] Yes (see summary sheet for update)  Subjective functional status/changes:   [] No changes reported  I didn't have as much pain last night, usually wakes me up. OBJECTIVE    10 min Therapeutic Exercise:  [] See flow sheet :   Rationale: increase ROM and increase strength to improve the patients ability to improve ease of steps, standing/walking tolerance    30 min Neuromuscular Re-education:  []  See flow sheet :   Rationale: increase strength, improve coordination, improve balance and increase proprioception  to improve the patients ability to increase stability for squats, transfers       With   [] TE   [] TA   [] neuro   [] other: Patient Education: [x] Review HEP    [] Progressed/Changed HEP based on:   [] positioning   [] body mechanics   [] transfers   [] heat/ice application    [] other:      Other Objective/Functional Measures:      Pain Level (0-10 scale) post treatment: 2    ASSESSMENT/Changes in Function: improved ease of squats, with equal WB.   Still has pain with step ups ascending with right,    Patient will continue to benefit from skilled PT services to modify and progress therapeutic interventions, address functional mobility deficits, address ROM deficits, address strength deficits, analyze and address soft tissue restrictions, analyze and cue movement patterns, analyze and modify body mechanics/ergonomics, assess and modify postural abnormalities, address imbalance/dizziness and instruct in home and community integration to attain remaining goals. []  See Plan of Care  []  See progress note/recertification  []  See Discharge Summary         Progress towards goals / Updated goals:  Goal: Pt to continue compliance with current HEP to improve right hip strength and stability. Status at last note/certification: Established and reviewed with Pt  Current status: met - Pt reports compliance with HEP  Long Term Goals: To be accomplished in 4 weeks:  Goal: Pt to increase right hip strength to 4+/5 grossly to negotiate 5-6 stairs with 1 UE support and no deviations to get into condo building. Status at last note/certification: 4/5 grossly  Current status: 4+/5 flexion, abduction, 4/5 adduction/extension  Goal: Pt to perform B SLS x 30 seconds without pain to increase ease of ambulation, stair negotiation. Status at last note/certification: 13 seconds right with pain, 15 seconds left  Current status:  Goal: Pt to perform 10 squats without pain or UE support to work towards increase ease of getting in/out of car, bathtub. Status at last note/certification: pain and weakness in LEs  Current status: mild discomfort in knees, hip no issues  Goal: Pt to report < 4/10 pain at worst to increase ease with getting in/out of car, bathtub. Status at last note/certification: 8/10 pain at worst  Current status:  3/10 improving confidence with sit to stands from chairs,  however due to height of tub, still uses handrail for assist  Goal: Pt to report FOTO score of 63 pts to show improved function and quality of life.   Status at last note/certification: WSSF 43 ZPR   Current status: reassess at MD note       PLAN  [x]  Upgrade activities as tolerated     []  Continue plan of care  []  Update interventions per flow sheet       []  Discharge due to:_  []  Other:_      Miguel Pinto, PTA 1/18/2021  1:38 PM    Future Appointments   Date Time Provider Diana Burton   1/21/2021  1:30 PM Nora [facility]   1/25/2021  1:30 PM Kala Andre, [facility]   1/28/2021  1:30 PM [place] [address]   2/1/2021 12:30 PM [facility] LORETTA RM 4 3D HBV[facility]AM [facility]   2/1/2021  1:00 PM HBV BONE DEXA RM 1 HBVRBD HBV

## 2021-01-21 ENCOUNTER — HOSPITAL ENCOUNTER (OUTPATIENT)
Dept: PHYSICAL THERAPY | Age: 76
Discharge: HOME | End: 2021-01-21
Payer: MEDICARE

## 2021-01-21 PROCEDURE — 97110 THERAPEUTIC EXERCISES: CPT

## 2021-01-21 PROCEDURE — 97112 NEUROMUSCULAR REEDUCATION: CPT

## 2021-01-21 NOTE — PROGRESS NOTES
PT DAILY TREATMENT NOTE     Patient Name: Ra Sanchez  Date:2021  : 1945  [x]  Patient  Verified  Payor: Ricardo Reid / Plan: [place] [employer] / Product Type: Managed Care Medicare /    In time:1:30  Out time: 2:10  Total Treatment Time (min): 40  Visit #: 5 of 8    Medicare/BCBS Only   Total Timed Codes (min):  40 1:1 Treatment Time:  40       Treatment Area: Pain in right hip [M25.551]    SUBJECTIVE  Pain Level (0-10 scale): 1  Any medication changes, allergies to medications, adverse drug reactions, diagnosis change, or new procedure performed?: [x] No    [] Yes (see summary sheet for update)  Subjective functional status/changes:   [] No changes reported  I definitely feel stronger, but not ye walking a comfortable mile    OBJECTIVE      8 min Therapeutic Exercise:  [] See flow sheet :   Rationale: increase ROM and increase strength to improve the patients ability to improve ease of walking, activity tolerance    32 min Neuromuscular Re-education:  []  See flow sheet :   Rationale: increase strength, improve coordination, improve balance and increase proprioception  to improve the patients ability to increase stability for progression to walking program       With   [] TE   [] TA   [] neuro   [] other: Patient Education: [x] Review HEP    [] Progressed/Changed HEP based on:   [] positioning   [] body mechanics   [] transfers   [] heat/ice application    [] other:      Other Objective/Functional Measures:   Gains: improved balance, mobility LE strength. Increased walking, but probably not a mile.   Up to about 12 minutes, but not \"relaxing\"   70% improved    Pain Level (0-10 scale) post treatment: 0    ASSESSMENT/Changes in Function: functionally pt is able to perform all activities as needed, but not yet comfortable with walking up to 1 mile    Patient will continue to benefit from skilled PT services to modify and progress therapeutic interventions, address functional mobility deficits, address ROM deficits, address strength deficits, analyze and address soft tissue restrictions, analyze and cue movement patterns, analyze and modify body mechanics/ergonomics, assess and modify postural abnormalities, address imbalance/dizziness and instruct in home and community integration to attain remaining goals. []  See Plan of Care  []  See progress note/recertification  []  See Discharge Summary         Progress towards goals / Updated goals:  improve right hip strength and stability. Status at last note/certification: Established and reviewed with Pt  Current status: met - Pt reports compliance with HEP  Long Term Goals: To be accomplished in 4 weeks:  Goal: Pt to increase right hip strength to 4+/5 grossly to negotiate 5-6 stairs with 1 UE support and no deviations to get into condo building. Status at last note/certification: 4/5 grossly  Current status: 4+/5 flexion, abduction, 4/5 adduction/extension  Goal: Pt to perform B SLS x 30 seconds without pain to increase ease of ambulation, stair negotiation. Status at last note/certification: 13 seconds right with pain, 15 seconds left  Current status:  Goal: Pt to perform 10 squats without pain or UE support to work towards increase ease of getting in/out of car, bathtub. Status at last note/certification: pain and weakness in LEs  Current status: mild discomfort in knees, hip no issues  Goal: Pt to report < 4/10 pain at worst to increase ease with getting in/out of car, bathtub. Status at last note/certification: 8/10 pain at worst  Current status:  3/10 improving confidence with sit to stands from chairs,  however due to height of tub, still uses handrail for assist  Goal: Pt to report FOTO score of 63 pts to show improved function and quality of life.   Status at last note/certification: HTTR 56 JWW   Current status: reassess at MD note    PLAN  [x]  Upgrade activities as tolerated     []  Continue plan of care  [] Update interventions per flow sheet       []  Discharge due to:_  []  Other:_      Tarry Laila, PTA 1/21/2021  1:54 PM    Future Appointments   Date Time Provider Diana Burton   1/25/2021  1:30 PM Catrachita Andre, PT [facility]   1/28/2021  1:30 PM [place] [address]   2/1/2021 12:30 PM HBV LORETTA RM 4 3D HBVRMAM [facility]   2/1/2021  1:00 PM HBV BONE DEXA RM 1 HBVRBD HBV

## 2021-01-25 ENCOUNTER — HOSPITAL ENCOUNTER (OUTPATIENT)
Dept: PHYSICAL THERAPY | Age: 76
Discharge: HOME | End: 2021-01-25
Payer: MEDICARE

## 2021-01-25 PROCEDURE — 97530 THERAPEUTIC ACTIVITIES: CPT

## 2021-01-25 PROCEDURE — 97112 NEUROMUSCULAR REEDUCATION: CPT

## 2021-01-25 PROCEDURE — 97110 THERAPEUTIC EXERCISES: CPT

## 2021-01-25 NOTE — PROGRESS NOTES
PT DISCHARGE DAILY NOTE AND [id number]    Patient name: Hilton Martinez Start of Care: 2021   Referral source: Ravindra Shay MD : 1945   Medical/Treatment Diagnosis: Pain in right hip [M25.551] Onset Date:2020     Prior Hospitalization: see medical history Provider#: [id number]   Medications: Verified on Patient Summary List    Comorbidities: osteoporosis, arthritis, thyroid problems, asthma   Prior Level of Function: lives in [place]; functionally independent with pain    Visits from Start of Care: 6    Missed Visits: 0    Reporting Period : 21 to 21    Date:2021  : 1945  [x]  Patient  Verified  Payor: Dariela Davila / Plan: [employer] / Product Type: Managed Care Medicare /    In time:1:30  Out time:2:12  Total Treatment Time (min): 42  Visit #: 6 of 8    Medicare/BCBS Only   Total Timed Codes (min):  42 1:1 Treatment Time:  42       SUBJECTIVE  Pain Level (0-10 scale): 10  Any medication changes, allergies to medications, adverse drug reactions, diagnosis change, or new procedure performed?: [x] No    [] Yes (see summary sheet for update)  Subjective functional status/changes:   [] No changes reported  \"I've been doing much better. I'm keeping up with the exercises at home and I can tell the difference. \"    OBJECTIVE    22 min Therapeutic Exercise:  [] See flow sheet :   Rationale: increase ROM and increase strength to improve the patients ability to increase ease of transfers, stairs, ADLs    10 min Therapeutic Activity:  []  See flow sheet : goals, FOTO   Rationale: increase ROM and increase strength  to improve the patients ability to increase ease of transfers, stairs, squatting     10 min Neuromuscular Re-education:  []  See flow sheet :   Rationale: increase strength and improve balance  to improve the patients ability to improve LE strength, stability, and dynamic balance for safety with gait          With   [] TE   [] TA   [] neuro   [] other: Patient Education: [x] Review HEP    [] Progressed/Changed HEP based on:   [] positioning   [] body mechanics   [] transfers   [] heat/ice application    [] other:      Other Objective/Functional Measures: Continued with exercises per flow sheet. Reassessed goals for discharge. Pain Level (0-10 scale) post treatment: 0/10    Summary of Care:  Short Term Goals: To be accomplished in 1 week:  Goal: Pt to continue compliance with current HEP to improve right hip strength and stability. Status at last note/certification: Established and reviewed with Pt  Current status: met - Pt reports compliance with HEP  Long Term Goals: To be accomplished in 4 weeks:  Goal: Pt to increase right hip strength to 4+/5 grossly to negotiate 5-6 stairs with 1 UE support and no deviations to get into condo building. Status at last note/certification: 4/5 grossly  Current status: progressing - 4+/5 flexion, abduction, 4/5 adduction/extension  Goal: Pt to perform B SLS x 30 seconds without pain to increase ease of ambulation, stair negotiation. Status at last note/certification: 13 seconds right with pain, 15 seconds left  Current status:  Goal: Pt to perform 10 squats without pain or UE support to work towards increase ease of getting in/out of car, bathtub. Status at last note/certification: pain and weakness in LEs  Current status: progressing - mild discomfort in knees, hip no issues  Goal: Pt to report < 4/10 pain at worst to increase ease with getting in/out of car, bathtub. Status at last note/certification: 8/10 pain at worst  Current status: met - 3/10, improving confidence with sit to stands from chairs,  however due to height of tub, still uses handrail for assist  Goal: Pt to report FOTO score of 63 pts to show improved function and quality of life.   Status at last note/certification: [facility] 78 NKC   Current status: progressing - FOTO 59 pts    ASSESSMENT/Changes in Function: Pt attended 6 visits consistently and made great progress with skilled physical therapy services. At time of last visit, Pt reported the following:  Functional Gains - Pt is able to sleep through the night, is able to negotiate stairs with increased ease, and notes less pain overall; Functional Deficits - Pt still notes stepping up on 8 inch curbs or balancing on right LE produces pressure to right hip; and 90% improvement since start of care. Pt has met or is progressing towards all goals and is compliant with comprehensive HEP. Pt is appropriate for D/C at this time to continue to manage care independently and maintain long term gains made with skilled therapy.   Thank you for this referral.      Thank you for this referral!      PLAN  [x]Discontinue therapy: [x]Patient has reached or is progressing toward set goals      []Patient is non-compliant or has abdicated      []Due to lack of appreciable progress towards set goals    Yousuf Andre, PT 1/25/2021  1:39 PM

## 2021-01-25 NOTE — PROGRESS NOTES
Physical Therapy Discharge Instructions      [facility] YESENIA BRIANJazmín PARULMING [facility], [address] [place] [address]  [phone] fax    Patient: Hilton Martinez  : 1945      Continue Home Exercise Program 1 times per day for 2 weeks, then decrease to 5-7 times per week      Continue with    [] Ice  as needed 1 times per day     [x] Heat           Follow up with MD:     [] Upon completion of therapy     [x] As needed      Recommendations:     [x]   Return to activity with home program    []   Return to activity with the following modifications:       []Post Rehab Program    []Join Independent aquatic program     []Return to/join local gym      Additional Comments: It has been a pleasure working with you! Keep up with the home exercises and please contact us if we can be of further assistance to you!     Jt Andre, PT 2021 2:09 PM

## 2021-01-28 ENCOUNTER — APPOINTMENT (OUTPATIENT)
Dept: PHYSICAL THERAPY | Age: 76
End: 2021-01-28
Payer: MEDICARE

## 2021-02-01 ENCOUNTER — HOSPITAL ENCOUNTER (OUTPATIENT)
Dept: MAMMOGRAPHY | Age: 76
Discharge: HOME | End: 2021-02-01
Attending: FAMILY MEDICINE
Payer: MEDICARE

## 2021-02-01 ENCOUNTER — HOSPITAL ENCOUNTER (OUTPATIENT)
Dept: BONE DENSITY | Age: 76
Discharge: HOME | End: 2021-02-01
Attending: FAMILY MEDICINE
Payer: MEDICARE

## 2021-02-01 DIAGNOSIS — Z12.31 SCREENING MAMMOGRAM, ENCOUNTER FOR: ICD-10-CM

## 2021-02-01 DIAGNOSIS — Z78.0 MENOPAUSE: ICD-10-CM

## 2021-02-01 DIAGNOSIS — M85.9 DISORDER OF BONE DENSITY AND STRUCTURE, UNSPECIFIED: ICD-10-CM

## 2021-02-01 DIAGNOSIS — M85.80 OSTEOPENIA: ICD-10-CM

## 2021-02-01 PROCEDURE — 77080 DXA BONE DENSITY AXIAL: CPT

## 2021-02-01 PROCEDURE — 77067 SCR MAMMO BI INCL CAD: CPT

## 2021-03-08 ENCOUNTER — TRANSCRIBE ORDER (OUTPATIENT)
Dept: SCHEDULING | Age: 76
End: 2021-03-08

## 2021-03-08 DIAGNOSIS — Z87.898 HISTORY OF ABNORMAL MAMMOGRAM: Primary | ICD-10-CM

## 2021-03-17 ENCOUNTER — TRANSCRIBE ORDER (OUTPATIENT)
Dept: SCHEDULING | Age: 76
End: 2021-03-17

## 2021-03-17 DIAGNOSIS — R92.8 ABNORMAL MAMMOGRAM: Primary | ICD-10-CM

## 2021-03-24 ENCOUNTER — HOSPITAL ENCOUNTER (OUTPATIENT)
Dept: ULTRASOUND IMAGING | Age: 76
Discharge: HOME | End: 2021-03-24
Attending: FAMILY MEDICINE
Payer: MEDICARE

## 2021-03-24 ENCOUNTER — HOSPITAL ENCOUNTER (OUTPATIENT)
Dept: MAMMOGRAPHY | Age: 76
Discharge: HOME | End: 2021-03-24
Attending: FAMILY MEDICINE
Payer: MEDICARE

## 2021-03-24 DIAGNOSIS — Z87.898 HISTORY OF ABNORMAL MAMMOGRAM: ICD-10-CM

## 2021-03-24 DIAGNOSIS — R92.8 ABNORMAL MAMMOGRAM: ICD-10-CM

## 2021-03-24 PROCEDURE — 77065 DX MAMMO INCL CAD UNI: CPT

## 2022-04-12 ENCOUNTER — OFFICE VISIT (OUTPATIENT)
Dept: ORTHOPEDIC SURGERY | Age: 77
End: 2022-04-12
Payer: MEDICARE

## 2022-04-12 VITALS — WEIGHT: 142 LBS | OXYGEN SATURATION: 98 % | HEART RATE: 88 BPM | BODY MASS INDEX: 25.16 KG/M2 | HEIGHT: 63 IN

## 2022-04-12 DIAGNOSIS — S42.291A CLOSED FRACTURE OF HEAD OF RIGHT HUMERUS, INITIAL ENCOUNTER: ICD-10-CM

## 2022-04-12 DIAGNOSIS — M25.511 RIGHT SHOULDER PAIN, UNSPECIFIED CHRONICITY: Primary | ICD-10-CM

## 2022-04-12 PROCEDURE — 1101F PT FALLS ASSESS-DOCD LE1/YR: CPT | Performed by: ORTHOPAEDIC SURGERY

## 2022-04-12 PROCEDURE — 1090F PRES/ABSN URINE INCON ASSESS: CPT | Performed by: ORTHOPAEDIC SURGERY

## 2022-04-12 PROCEDURE — G8427 DOCREV CUR MEDS BY ELIG CLIN: HCPCS | Performed by: ORTHOPAEDIC SURGERY

## 2022-04-12 PROCEDURE — G8432 DEP SCR NOT DOC, RNG: HCPCS | Performed by: ORTHOPAEDIC SURGERY

## 2022-04-12 PROCEDURE — G8419 CALC BMI OUT NRM PARAM NOF/U: HCPCS | Performed by: ORTHOPAEDIC SURGERY

## 2022-04-12 PROCEDURE — G8536 NO DOC ELDER MAL SCRN: HCPCS | Performed by: ORTHOPAEDIC SURGERY

## 2022-04-12 PROCEDURE — 73030 X-RAY EXAM OF SHOULDER: CPT | Performed by: ORTHOPAEDIC SURGERY

## 2022-04-12 PROCEDURE — G8399 PT W/DXA RESULTS DOCUMENT: HCPCS | Performed by: ORTHOPAEDIC SURGERY

## 2022-04-12 PROCEDURE — 99203 OFFICE O/P NEW LOW 30 MIN: CPT | Performed by: ORTHOPAEDIC SURGERY

## 2022-04-12 RX ORDER — TRAMADOL HYDROCHLORIDE 50 MG/1
50 TABLET ORAL
Qty: 28 TABLET | Refills: 0 | Status: SHIPPED | OUTPATIENT
Start: 2022-04-12 | End: 2022-04-22

## 2022-04-12 NOTE — PROGRESS NOTES
Lory Perez  1945   Chief Complaint   Patient presents with    Shoulder Pain     right shoulder        HISTORY OF PRESENT ILLNESS  Lory Perez is a 68 y.o. female who presents today for evaluation of right shoulder. She rates her pain 6/10 today. Pain has been present since 3/19/2022 when she was playing soccer with her grandson, slipped and landed on her right shoulder. She was seen at [facility] ED and diagnosed with a fracture of the humeral head with subluxation. Minimal relief with ibuprofen and oxycodone. Presents today wearing a sling. Notes the wrist and elbow have improved. Patient describes the pain as aching and sharp that is Constant in nature. Symptoms are worse with Bending; Stretching; Straightening and is better with  NSAIDs. Associated symptoms include nothing. Since problem started, it: is unchanged. Pain does wake patient up at night. Has taken NSAIDs for the problem. Has tried following treatments: Injections:NO; Brace:NO; Therapy:NO; Cane/Crutch:NO       Allergies   Allergen Reactions    Morphine Other (comments)     BP DROPPED    Sulfa (Sulfonamide Antibiotics) Other (comments)     MIGRAINE        Past Medical History:   Diagnosis Date    Arthritis     Menopause     Thyroid disease     nODULE-BENIGN      Social History     Socioeconomic History    Marital status: SINGLE     Spouse name: Not on file    Number of children: Not on file    Years of education: Not on file    Highest education level: Not on file   Occupational History    Not on file   Tobacco Use    Smoking status: Never Smoker    Smokeless tobacco: Never Used   Substance and Sexual Activity    Alcohol use:  Yes     Alcohol/week: 5.0 standard drinks     Types: 6 Glasses of wine per week     Comment: DAILY    Drug use: No    Sexual activity: Not on file   Other Topics Concern    Not on file   Social History Narrative    Not on file     Social Determinants of Health     Financial Resource Strain:     Difficulty of Paying Living Expenses: Not on file   Food Insecurity:     Worried About Running Out of Food in the Last Year: Not on file    Paulette of Food in the Last Year: Not on file   Transportation Needs:     Lack of Transportation (Medical): Not on file    Lack of Transportation (Non-Medical): Not on file   Physical Activity:     Days of Exercise per Week: Not on file    Minutes of Exercise per Session: Not on file   Stress:     Feeling of Stress : Not on file   Social Connections:     Frequency of Communication with Friends and Family: Not on file    Frequency of Social Gatherings with Friends and Family: Not on file    Attends Christianity Services: Not on file    Active Member of [address] [place] [address] [employer] or Organizations: Not on file    Attends Club or Organization Meetings: Not on file    Marital Status: Not on file   Intimate Partner Violence:     Fear of Current or Ex-Partner: Not on file    Emotionally Abused: Not on file    Physically Abused: Not on file    Sexually Abused: Not on file   Housing Stability:     Unable to Pay for Housing in the Last Year: Not on file    Number of [place] in the Last Year: Not on file    Unstable Housing in the Last Year: Not on file      Past Surgical History:   Procedure Laterality Date    HX  SECTION      HX OTHER SURGICAL      REMOVAL OF FATTY TISSUE ON ARM    HX PARTIAL THYROIDECTOMY      NEUROLOGICAL PROCEDURE UNLISTED      PINCHED NERVE SURGERY      History reviewed. No pertinent family history. Current Outpatient Medications   Medication Sig    fluticasone (FLONASE) 50 mcg/actuation nasal spray 1 [place] by Both Nostrils route nightly. Indications: ALLERGIC RHINITIS    levothyroxine (SYNTHROID) 50 mcg tablet Take  by mouth Daily (before breakfast).  acyclovir (ZOVIRAX) 200 mg capsule Take  by mouth daily.  Indications: PREVENT RECURRENCE OF HERPES    conjugated estrogens (PREMARIN) 0.625 mg/gram vaginal cream Insert 0.5 g into vagina every seven (7) days. No current facility-administered medications for this visit. REVIEW OF SYSTEM   Patient denies: Weight loss, Fever/Chills, HA, Visual changes, Fatigue, Chest pain, SOB, Abdominal pain, N/V/D/C, Blood in stool or urine, Edema. Pertinent positive as above in HPI. All others were negative    PHYSICAL EXAM:   Visit Vitals  Pulse 88   Ht 5' 3\" (1.6 m)   Wt 142 lb (64.4 kg)   SpO2 98%   BMI 25.15 kg/m²     The patient is a well-developed, well-nourished female   in no acute distress. The patient is alert and oriented times three. The patient is alert and oriented times three. Mood and affect are normal.  LYMPHATIC: lymph nodes are not enlarged and are within normal limits  SKIN: normal in color and non tender to palpation. There are no bruises or abrasions noted. NEUROLOGICAL: Motor sensory exam is within normal limits. Reflexes are equal bilaterally. There is normal sensation to pinprick and light touch  MUSCULOSKELETAL:  Examination Right shoulder   Skin Intact   AC joint tenderness -   Biceps tenderness -   Forward flexion/Elevation ROM 30   Active abduction ROM 30   Glenohumeral abduction 30   External rotation ROM 30   Internal rotation ROM 30   Apprehension -   Gunners Relocation -   Jerk -   Load and Shift -   Obriens -   Speeds -   Impingement sign -   Supraspinatus/Empty Can +   External Rotation Strength -, 5/5   Lift Off/Belly Press -, 5/5   Neurovascular Intact        PROCEDURE: none    IMAGING: XR of the right shoulder with 3 views obtained in the office dated 4/12/2022 was reviewed and read by Dr. Ludwig Hoover: comminuted displaced proximal humerus fracture    CT of the right UE dated 3/20/2022 read and reviewed by Dr. Munroe Ax: Comminuted fracture of the right middle head with impacted humeral neck. Hepatic cysts.          XR of the right shoulder with 2 views obtained at [facility] dated 3/19/2022 was reviewed and read by Dr. Ludwig Hoover: Comminuted right humeral head and neck fracture with resultant inferior subluxation of the right glenohumeral joint. IMPRESSION:      ICD-10-CM ICD-9-CM    1. Right shoulder pain, unspecified chronicity  M25.511 719.41 AMB POC XRAY, SHOULDER; COMPLETE, 2+   2. Closed fracture of head of right humerus, initial encounter  S42.291A 812.09         PLAN:  1. Patient presents today with right shoulder pain due to a displaced humeral head fracture. I discussed treatment options today including a shoulder replacement due to the displacement of the fracture. She would like to hold off on surgery for now. Discussed starting some ROM exercises for the elbow to help with mobility. Provided a sling today and prescribed Tramadol to help with pain. Return in 2 weeks for repeat XR. Risk factors include: hx of arthritis   2. No ultrasound exam indicated today  3. No cortisone injection indicated today   4. No Physical/Occupational Therapy indicated today  5. No diagnostic test indicated today:   6. Yes durable medical equipment indicated today SLING  7. No referral indicated today   8. No medications indicated today:   9. Yes Narcotic indicated today Patient given pain medication for short term acute pain relief. Goal is to treat patient according to above plan and to ultimately have patient off all pain medications once appropriate. If chronic pain management is required beyond what is expected for current orthopedic problem, will refer patient to pain management.  was reviewed and will be reviewed with every medication refill request.       RTC 2 weeks for repeat XR       Scribed by Edilson Wylie) as dictated by Ramin Archuleta MD    I, Dr. Ramin Archuleta, confirm that all documentation is accurate.     Ramin Archuleta M.D.   [facility]

## 2022-04-20 DIAGNOSIS — S42.291A CLOSED FRACTURE OF HEAD OF RIGHT HUMERUS, INITIAL ENCOUNTER: ICD-10-CM

## 2022-04-22 RX ORDER — TRAMADOL HYDROCHLORIDE 50 MG/1
TABLET ORAL
Qty: 28 TABLET | Refills: 0 | Status: SHIPPED | OUTPATIENT
Start: 2022-04-22 | End: 2022-04-29

## 2022-04-26 ENCOUNTER — OFFICE VISIT (OUTPATIENT)
Dept: ORTHOPEDIC SURGERY | Age: 77
End: 2022-04-26
Payer: MEDICARE

## 2022-04-26 DIAGNOSIS — S42.291A CLOSED FRACTURE OF HEAD OF RIGHT HUMERUS, INITIAL ENCOUNTER: Primary | ICD-10-CM

## 2022-04-26 PROCEDURE — 99213 OFFICE O/P EST LOW 20 MIN: CPT | Performed by: ORTHOPAEDIC SURGERY

## 2022-04-26 NOTE — PROGRESS NOTES
Wilson Piper  1945   Chief Complaint   Patient presents with    Shoulder Pain     right shoulder pain        HISTORY OF PRESENT ILLNESS  Wilosn Piper is a 68 y.o. female who presents today for reevaluation of right shoulder. She rates her pain 10/10 today. Pain has been present since 3/19/2022 when she was playing soccer with her grandson, slipped and landed on her right shoulder. She was seen at [facility] ED and diagnosed with a fracture of the humeral head with subluxation. Minimal relief with ibuprofen and oxycodone. Presents today wearing a sling. Notes the wrist and elbow have improved. Patient denies any fever, chills, chest pain, shortness of breath or calf pain. The remainder of the review of systems is negative. There are no new illness or injuries to report since last seen in the office. There are no changes to medications, allergies, family or social history. PHYSICAL EXAM:   There were no vitals taken for this visit. The patient is a well-developed, well-nourished female   in no acute distress. The patient is alert and oriented times three. The patient is alert and oriented times three. Mood and affect are normal.  LYMPHATIC: lymph nodes are not enlarged and are within normal limits  SKIN: normal in color and non tender to palpation. There are no bruises or abrasions noted. NEUROLOGICAL: Motor sensory exam is within normal limits. Reflexes are equal bilaterally.  There is normal sensation to pinprick and light touch  MUSCULOSKELETAL:  Examination Right shoulder   Skin Intact   AC joint tenderness -   Biceps tenderness -   Forward flexion/Elevation ROM 30   Active abduction ROM 30   Glenohumeral abduction 30   External rotation ROM 30   Internal rotation ROM 30   Apprehension -   Gunners Relocation -   Jerk -   Load and Shift -   Obriens -   Speeds -   Impingement sign -   Supraspinatus/Empty Can +   External Rotation Strength -, 5/5   Lift Off/Belly Press -, 5/5 Neurovascular Intact        PROCEDURE: none    IMAGING: XR of the right shoulder with 3 views obtained in the office dated 4/26/2022 was reviewed and read by Dr. Fabiana Archer: displaced proximal humerus fracture    XR of the right shoulder with 3 views obtained in the office dated 4/12/2022 was reviewed and read by Dr. Fabiana Archer: comminuted displaced proximal humerus fracture    CT of the right UE dated 3/20/2022 read and reviewed by Dr. Fabiana Archer: Comminuted fracture of the right middle head with impacted humeral neck. Hepatic cysts. XR of the right shoulder with 2 views obtained at [facility] dated 3/19/2022 was reviewed and read by Dr. Fabiana Archer: Comminuted right humeral head and neck fracture with resultant inferior subluxation of the right glenohumeral joint. IMPRESSION:      ICD-10-CM ICD-9-CM    1. Closed fracture of head of right humerus, initial encounter  S42.291A 812.09         PLAN:  1. Patient presents today with right shoulder pain due to a displaced humeral head fracture. I discussed treatment options today including a shoulder replacement due to the displacement of the fracture. She would like to hold off on surgery for now. Discussed starting some ROM exercises for the elbow to help with mobility. Provided a sling today and prescribed Tramadol to help with pain. Return in 2 weeks for repeat XR. Risk factors include: hx of arthritis   2. No ultrasound exam indicated today  3. No cortisone injection indicated today   4. No Physical/Occupational Therapy indicated today  5. No diagnostic test indicated today:   6. Yes durable medical equipment indicated today SLING  7. No referral indicated today   8. No medications indicated today:   9. Yes Narcotic indicated today Patient given pain medication for short term acute pain relief. Goal is to treat patient according to above plan and to ultimately have patient off all pain medications once appropriate.  If chronic pain management is required beyond what is expected for current orthopedic problem, will refer patient to pain management.  was reviewed and will be reviewed with every medication refill request.       RTC 2 weeks for repeat XR       Scribed by Clark Perales[address] [place] [address] [address]) as dictated by Jon Lou MD    I, Dr. Jon Lou, confirm that all documentation is accurate.     Jon Lou M.D.   [facility]

## 2022-05-03 ENCOUNTER — DOCUMENTATION ONLY (OUTPATIENT)
Dept: ORTHOPEDIC SURGERY | Age: 77
End: 2022-05-03

## 2022-05-03 ENCOUNTER — OFFICE VISIT (OUTPATIENT)
Dept: ORTHOPEDIC SURGERY | Age: 77
End: 2022-05-03
Payer: MEDICARE

## 2022-05-03 ENCOUNTER — TELEPHONE (OUTPATIENT)
Dept: ORTHOPEDIC SURGERY | Age: 77
End: 2022-05-03

## 2022-05-03 VITALS
TEMPERATURE: 97.7 F | BODY MASS INDEX: 23.42 KG/M2 | HEIGHT: 64 IN | OXYGEN SATURATION: 97 % | WEIGHT: 137.2 LBS | HEART RATE: 93 BPM

## 2022-05-03 DIAGNOSIS — S42.291A CLOSED FRACTURE OF HEAD OF RIGHT HUMERUS, INITIAL ENCOUNTER: Primary | ICD-10-CM

## 2022-05-03 PROCEDURE — G8399 PT W/DXA RESULTS DOCUMENT: HCPCS | Performed by: ORTHOPAEDIC SURGERY

## 2022-05-03 PROCEDURE — 73030 X-RAY EXAM OF SHOULDER: CPT | Performed by: ORTHOPAEDIC SURGERY

## 2022-05-03 PROCEDURE — G8427 DOCREV CUR MEDS BY ELIG CLIN: HCPCS | Performed by: ORTHOPAEDIC SURGERY

## 2022-05-03 PROCEDURE — G8536 NO DOC ELDER MAL SCRN: HCPCS | Performed by: ORTHOPAEDIC SURGERY

## 2022-05-03 PROCEDURE — 99213 OFFICE O/P EST LOW 20 MIN: CPT | Performed by: ORTHOPAEDIC SURGERY

## 2022-05-03 PROCEDURE — G8432 DEP SCR NOT DOC, RNG: HCPCS | Performed by: ORTHOPAEDIC SURGERY

## 2022-05-03 PROCEDURE — 1101F PT FALLS ASSESS-DOCD LE1/YR: CPT | Performed by: ORTHOPAEDIC SURGERY

## 2022-05-03 PROCEDURE — G8420 CALC BMI NORM PARAMETERS: HCPCS | Performed by: ORTHOPAEDIC SURGERY

## 2022-05-03 PROCEDURE — 1090F PRES/ABSN URINE INCON ASSESS: CPT | Performed by: ORTHOPAEDIC SURGERY

## 2022-05-03 NOTE — PROGRESS NOTES
Keyon Garcia  1945   Chief Complaint   Patient presents with    Shoulder Pain     RT F/U        HISTORY OF PRESENT ILLNESS  Keyon Garcia is a 68 y.o. female who presents today for reevaluation of right shoulder. She rates her pain 2/10 today. Pain is localized at the top of the shoulder. She notes improvements since last OV. Pain has been present since 3/19/2022 when she was playing soccer with her grandson, slipped and landed on her right shoulder. She was seen at [facility] ED and diagnosed with a fracture of the humeral head with subluxation. Has been taking tramadol and tylenol 500. Minimal relief with ibuprofen and oxycodone. Presents today wearing a sling. Notes the wrist and elbow have improved. Patient denies any fever, chills, chest pain, shortness of breath or calf pain. The remainder of the review of systems is negative. There are no new illness or injuries to report since last seen in the office. There are no changes to medications, allergies, family or social history. PHYSICAL EXAM:   Visit Vitals  Pulse 93   Temp 97.7 °F (36.5 °C) (Temporal)   Ht 5' 4\" (1.626 m)   Wt 137 lb 3.2 oz (62.2 kg)   SpO2 97%   BMI 23.55 kg/m²     The patient is a well-developed, well-nourished female   in no acute distress. The patient is alert and oriented times three. The patient is alert and oriented times three. Mood and affect are normal.  LYMPHATIC: lymph nodes are not enlarged and are within normal limits  SKIN: normal in color and non tender to palpation. There are no bruises or abrasions noted. NEUROLOGICAL: Motor sensory exam is within normal limits. Reflexes are equal bilaterally.  There is normal sensation to pinprick and light touch  MUSCULOSKELETAL:  Examination Right shoulder   Skin Intact   AC joint tenderness -   Biceps tenderness -   Forward flexion/Elevation ROM 30   Active abduction ROM 30   Glenohumeral abduction 30   External rotation ROM 30   Internal rotation ROM 30 Apprehension -   Gunners Relocation -   Jerk -   Load and Shift -   Obriens -   Speeds -   Impingement sign -   Supraspinatus/Empty Can +   External Rotation Strength -, 5/5   Lift Off/Belly Press -, 5/5   Neurovascular Intact        PROCEDURE: none    IMAGING: XR of the right shoulder with 3 views obtained in the office dated 5/3/2022 was reviewed and read by Dr. Amanda Schmidt: no changes in position of the fracture site. significant displacement    XR of the right shoulder with 3 views obtained in the office dated 4/26/2022 was reviewed and r ead by Dr. Amanda Schmidt: displaced proximal humerus fracture    XR of the right shoulder with 3 views obtained in the office dated 4/12/2022 was reviewed and read by Dr. Amanda Schmidt: comminuted displaced proximal humerus fracture    CT of the right UE dated 3/20/2022 read and reviewed by Dr. Amanda Schmidt: Comminuted fracture of the right middle head with impacted humeral neck. Hepatic cysts. XR of the right shoulder with 2 views obtained at [facility] dated 3/19/2022 was reviewed and read by Dr. Amanda Schmidt: Comminuted right humeral head and neck fracture with resultant inferior subluxation of the right glenohumeral joint. IMPRESSION:      ICD-10-CM ICD-9-CM    1. Closed fracture of head of right humerus, initial encounter  S42.291A 812.09 AMB POC XRAY, SHOULDER; COMPLETE, 2+        PLAN:  1. Patient presents today with right shoulder pain due to a displaced humeral head fracture. Recommended a reverse total shoulder arthroplasty. Pt would not like to continue with surgery at this time. OK to d/c sling. Provided her with Remedios's card to call if she would like to discuss surgery   Risk factors include: hx of arthritis   2. No ultrasound exam indicated today  3. No cortisone injection indicated today   4. No Physical/Occupational Therapy indicated today  5. No diagnostic test indicated today:   6. No durable medical equipment indicated today   7. No referral indicated today   8.  No medications indicated today:   9. No Narcotic indicated today       RTC 3 weeks      Scribed by Lynsey Sanchez [facility]) as dictated by Sumeet Morales MD    I, Dr. Sumeet Morales, confirm that all documentation is accurate.     Sumeet Morales M.D.   [facility]

## 2022-05-03 NOTE — TELEPHONE ENCOUNTER
Patient emailed me with the following questions. She was seen this morning by Dr. Candis Gallegos and  she is trying to decide if she wants to have surgery on her shoulder. Can she see her xray on the computer? What is the name of the surgery? Is it replacement of parts? Which parts? Is it a hospital stay? When? (if its a total it would depend on her clearances but we are scheduling out end of June)  where? How long would her recovery time be? Would she be in a sling? When would she start PT? When would she be able to drive? She would like anything else you can add to help her decide.   Please call patient to advise [phone]

## 2022-05-03 NOTE — PROGRESS NOTES
Patients call is return, left vm and requested patient call for an appointment to go over details of surgery.

## 2022-05-04 NOTE — TELEPHONE ENCOUNTER
While we understand her situation, Her questions are too much to go over on a phone visit. We also cannot show her xrays through a phone.  We also feel that we could better answer all of her questions in person

## 2022-05-04 NOTE — TELEPHONE ENCOUNTER
Patient called stating that she can not drive right now is it a way that she can do an virtual appointment.  Patient contact [phone]

## 2022-05-04 NOTE — TELEPHONE ENCOUNTER
Patient call returned, she is requesting an appt-I advised her that the [facility] will call her in the morning.

## 2022-05-10 ENCOUNTER — OFFICE VISIT (OUTPATIENT)
Dept: ORTHOPEDIC SURGERY | Age: 77
End: 2022-05-10
Payer: MEDICARE

## 2022-05-10 VITALS — WEIGHT: 135 LBS | TEMPERATURE: 97.3 F | HEIGHT: 62 IN | BODY MASS INDEX: 24.84 KG/M2

## 2022-05-10 DIAGNOSIS — Z01.818 PRE-OP TESTING: ICD-10-CM

## 2022-05-10 DIAGNOSIS — S42.291A CLOSED FRACTURE OF HEAD OF RIGHT HUMERUS, INITIAL ENCOUNTER: Primary | ICD-10-CM

## 2022-05-10 PROCEDURE — 99214 OFFICE O/P EST MOD 30 MIN: CPT | Performed by: ORTHOPAEDIC SURGERY

## 2022-05-10 PROCEDURE — G8399 PT W/DXA RESULTS DOCUMENT: HCPCS | Performed by: ORTHOPAEDIC SURGERY

## 2022-05-10 PROCEDURE — G8432 DEP SCR NOT DOC, RNG: HCPCS | Performed by: ORTHOPAEDIC SURGERY

## 2022-05-10 PROCEDURE — G8427 DOCREV CUR MEDS BY ELIG CLIN: HCPCS | Performed by: ORTHOPAEDIC SURGERY

## 2022-05-10 PROCEDURE — G8536 NO DOC ELDER MAL SCRN: HCPCS | Performed by: ORTHOPAEDIC SURGERY

## 2022-05-10 PROCEDURE — 1101F PT FALLS ASSESS-DOCD LE1/YR: CPT | Performed by: ORTHOPAEDIC SURGERY

## 2022-05-10 PROCEDURE — G8420 CALC BMI NORM PARAMETERS: HCPCS | Performed by: ORTHOPAEDIC SURGERY

## 2022-05-10 PROCEDURE — 1090F PRES/ABSN URINE INCON ASSESS: CPT | Performed by: ORTHOPAEDIC SURGERY

## 2022-05-10 RX ORDER — ACETAMINOPHEN 325 MG/1
1000 TABLET ORAL ONCE
Status: CANCELLED | OUTPATIENT
Start: 2022-05-10 | End: 2022-05-10

## 2022-05-10 RX ORDER — GABAPENTIN 100 MG/1
400 CAPSULE ORAL ONCE
Status: CANCELLED | OUTPATIENT
Start: 2022-05-10 | End: 2022-05-10

## 2022-05-10 RX ORDER — HYDROCODONE BITARTRATE AND ACETAMINOPHEN 7.5; 325 MG/1; MG/1
1 TABLET ORAL
Qty: 40 TABLET | Refills: 0 | Status: SHIPPED | OUTPATIENT
Start: 2022-05-10 | End: 2022-05-17

## 2022-05-10 NOTE — LETTER
[address] [facility]    Surgery Request Form [facility] the [facility]        Fax to [phone]                                        Telephone: [phone] or [phone]    To be completed by Physician Office:    Date: 5/10/2022    Requested by: Jose Wilkerson    Phone No: [phone]  Fax No:  [phone]      Surgery Date: 2022  Requested Time: 7:30AM                 Surgeon: Tee Farias MD  Assistant/2nd Surgeon:        Please [place]:            EMERGENT       URGENT         ELECTIVE   CPT CODE*  Procedure   83924 RIGHT SHOULDER REVERSE TOTAL SHOULDER REPLACEMENT           *CPT code is required for ALL procedures. Patient Information:    Name: Robbie Payne    SSN: [id number]  : 1945   Male/Female: female    Home Phone No: [phone] (home)     Primary Insurance:Payor: [employer] / Plan: [employer] / Product Type: Managed Care Medicare /    Insurance Policy Number:   Active Insurance as of 5/10/2022     Primary Coverage     [address] [facility] Employer/Plan Group    [employer] MEDICARE [place]     Payor Plan Address Payor Plan Phone Number Payor Plan Fax Number Effective Dates    [address] [phone]  2020 - Sybil Recio  [address] Name [address] Birth Date Member ID       Kelly Renteria 1945 [id number]                  *If self-pay, please fax Self-Pay/Vibha Verification Form with this request. Elective cases will not be scheduled until approved. Latex Allergy:  No     Vaccination status: There is no immunization history on file for this patient. IMPRESSION:        ICD-10-CM ICD-9-CM     1.  Closed fracture of head of right humerus, initial encounter  S42.291A 812.09 AMB POC XRAY, SHOULDER; COMPLETE, 2+               Admission:  Same Day Admit      Anesthesia Type General w/Nerve Block for Post-op Pain Control    Comments/Special Equipment and/or  AR[employer]REX     2 '[facility] WILL NOT BE IN OR)

## 2022-05-10 NOTE — H&P
HISTORY AND PHYSICAL          Patient: Deanne Duong                MRN: [id number]       SSN: [id number]  YOB: 1945          AGE: 68 y.o. SEX: female      Patient scheduled for:  Right reverse total shoulder arthroplasty    Surgeon: Tamica Bennett MD    ANESTHESIA TYPE:  General    HISTORY:     The patient was seen in the office today for a preoperative history and physical for an upcoming above listed surgery. The patient is a pleasant 68 y.o. female who has a history of right shoulder pain. She rates her pain 7/10 today. Pain is localized at the top of the shoulder. She notes improvements since last OV. Pain has been present since 3/19/2022 when she was playing soccer with her grandson, slipped and landed on her right shoulder. She was seen at [facility] ED and diagnosed with a fracture of the humeral head with subluxation. Has been taking tramadol and tylenol 500. Minimal relief with ibuprofen and oxycodone. Presents today wearing a sling. Notes the wrist and elbow have improved. Due to the current findings, affected activity of daily living and continued pain and discomfort, surgical intervention is indicated. The alternatives, risks, and complications, including but not limited to infection, blood loss, need for blood transfusion, neurovascular damage, heri-incisional numbness, subcutaneous hematoma, bone fracture, anesthetic complications, DVT, PE, death, RSD, postoperative stiffness and pain, possible surgical scar, delayed healing and nonhealing, reflexive sympathetic dystrophy, damage to blood vessels and nerves, need for more surgery, MI, and stroke,  failure of hardware, gait disturbances,have been discussed. The patient understands and wishes to proceed with surgery.      PAST MEDICAL HISTORY:     Past Medical History:   Diagnosis Date    Arthritis     Menopause     Thyroid disease     nODULE-BENIGN       CURRENT MEDICATIONS:     Current Outpatient Medications   Medication Sig Dispense Refill    fluticasone (FLONASE) 50 mcg/actuation nasal spray 1 [place] by Both Nostrils route nightly. Indications: ALLERGIC RHINITIS      levothyroxine (SYNTHROID) 50 mcg tablet Take  by mouth Daily (before breakfast).  acyclovir (ZOVIRAX) 200 mg capsule Take  by mouth daily. Indications: PREVENT RECURRENCE OF HERPES      conjugated estrogens (PREMARIN) 0.625 mg/gram vaginal cream Insert 0.5 g into vagina every seven (7) days. ALLERGIES:     Allergies   Allergen Reactions    Morphine Other (comments)     BP DROPPED    Sulfa (Sulfonamide Antibiotics) Other (comments)     MIGRAINE         SURGICAL HISTORY:     Past Surgical History:   Procedure Laterality Date    HX  SECTION      HX OTHER SURGICAL      REMOVAL OF FATTY TISSUE ON ARM    HX PARTIAL THYROIDECTOMY      NEUROLOGICAL PROCEDURE UNLISTED      PINCHED NERVE SURGERY       SOCIAL HISTORY:     Social History     Socioeconomic History    Marital status: SINGLE   Tobacco Use    Smoking status: Never Smoker    Smokeless tobacco: Never Used   Substance and Sexual Activity    Alcohol use: Yes     Alcohol/week: 5.0 standard drinks     Types: 6 Glasses of wine per week     Comment: DAILY    Drug use: No       FAMILY HISTORY:     No family history on file. REVIEW OF SYSTEMS:     Negative for fevers, chills, chest pain, shortness of breath, weight loss, recent illness     General: Negative for fever and chills. No unexpected change in weight. Denies fatigue. No change in appetite. Skin: Negative for rash or itching. HEENT: Negative for congestion, sore throat, neck pain and neck stiffness. No change in vision or hearing. Hasn't noted any enlarged lymph nodes in the neck. Cardiovascular:  Negative for chest pain and palpitations. Has not noted pedal edema. Respiratory: Negative for cough, colds, sinus, hemoptysis, shortness of breath and wheezing.   Gastrointestinal: Negative for nausea and vomiting, rectal bleeding, coffee ground emesis, abdominal pain, diarrhea and constipation. Genitourinary: Negative for dysuria, frequency urgency, or burning on micturition. No flank pain, no foul smelling urine, no difficulty with initiating urination. Hematological: Negative for bleeding or easy bruising. Musculoskeletal: Negative  for arthralgias, back pain or neck pain. Neurological: Negative for dizziness, seizures or syncopal episodes. Denies headaches. Endocrine: Denies excessive thirst.  No heat/cold intolerance. Psychiatric: Negative for depression or insomnia. PHYSICAL EXAMINATION:     VITALS:   Visit Vitals  Temp 97.3 °F (36.3 °C)   Ht 5' 2\" (1.575 m)   Wt 135 lb (61.2 kg)   BMI 24.69 kg/m²     GEN:  Well developed, well nourished 68 y.o. female in no acute distress. HEENT: Normocephalic and atraumatic. Eyes: Conjunctivae and EOM are normal.Pupils are equal, round, and reactive to light. External ear normal appearance, external nose normal appearing. Mouth/Throat: Oropharynx is clear and moist, able to handle oral secretions w/out difficulty, airway patent  NECK: Supple. Normal ROM, No lymphadenopathy. Trachea is midline. No bruising, swelling or deformity  RESP: Clear to auscultation bilaterally. No wheezes, rales, rhonchi. Normal effort and breath sounds. No respiratory distress  CARDIO:  Normal rate, regular rhythm and normal heart sounds. No MGR. ABDOMEN: Soft, non-tender, non-distended, normoactive bowel sounds in all four quadrants. There is no tenderness. There is no rebound and no guarding.    BACK: No CVA or spinal tenderness  BREAST:  Deferred  PELVIC:    Deferred   RECTAL:  Deferred   :           Deferred  EXTREMITIES: EXAMINATION OF: right shoulder    Examination Right shoulder   Skin Intact   AC joint tenderness -   Biceps tenderness -   Forward flexion/Elevation ROM 30   Active abduction ROM 30   Glenohumeral abduction 30   External rotation ROM 30   Internal rotation ROM 30 Apprehension -   Gunners Relocation -   Jerk -   Load and Shift -   Obriens -   Speeds -   Impingement sign -   Supraspinatus/Empty Can +   External Rotation Strength -, 5/5   Lift Off/Belly Press -, 5/5   Neurovascular Intact        NEUROVASCULAR: Sensation intact to light touch and strength grossly intact and symmetrical. No nystagmus. Positive distal pulses and capillary refill. DVT ASSESSMENT:  There is not  calf tenderness. No evidence of DVT seen on physical exam.  MOTOR: In tact  PSYCH: Alert an oriented to person, place and time. Mood, memory, affect, behavior and judgment normal       RADIOGRAPHS & DIAGNOSTIC STUDIES:     MRI/xray reveals : IMAGING: XR of the right shoulder with 3 views obtained in the office dated 5/3/2022 was reviewed and read by Dr. JoseD Patel: no changes in position of the fracture site. significant displacement     XR of the right shoulder with 3 views obtained in the office dated 4/26/2022 was reviewed and r ead by Dr. Jose D Patel: displaced proximal humerus fracture     XR of the right shoulder with 3 views obtained in the office dated 4/12/2022 was reviewed and read by Dr. Jose D Patel: comminuted displaced proximal humerus fracture     CT of the right UE dated 3/20/2022 read and reviewed by Dr. Jose D Patel: Comminuted fracture of the right middle head with impacted humeral neck. Hepatic cysts.           XR of the right shoulder with 2 views obtained at [facility] dated 3/19/2022 was reviewed and read by Dr. Jose D Patel: Comminuted right humeral head and neck fracture with resultant inferior subluxation of the right glenohumeral joint. LABS:     Labs pending    ASSESSMENT:       Encounter Diagnoses   Name Primary?  Closed fracture of head of right humerus, initial encounter Yes    Pre-op testing        PLAN:     Again, the alternatives, risks, and complications, as well as expected outcome were discussed.  The patient understands and agrees to proceed with right reverse total shoulder arthroplasty. The patient was counseled at length about the risks of sherry Covid-19 during their perioperative period and any recovery window from their procedure. The patient was made aware that sherry Covid-19  may worsen their prognosis for recovering from their procedure and lend to a higher morbidity and/or mortality risk. All material risks, benefits, and reasonable alternatives including postponing the procedure were discussed. The patient does  wish to proceed with the procedure at this time.    Patient given orders listed below:    Orders Placed This Encounter    XR CHEST PA LAT    CBC WITH AUTOMATED DIFF    METABOLIC PANEL, BASIC    PTT    PROTHROMBIN TIME + INR    URINALYSIS W/ RFLX MICROSCOPIC    [facility] [employer] [facility] [address]    EKG, 12 LEAD, INITIAL    TYPE & SCREEN         Malina Cabrera PA-C  5/10/2022  11:47 AM

## 2022-05-10 NOTE — LETTER
Patient: Chari Joseph    Your surgery is scheduled for: 05/20/2022  at [facility]. Procedure: RIGHT REVERSE TOTAL SHOULDER REPLACEMENT    On the day of your surgery please report to [facility] at: 5:30AM  This is a same day admit. Lab: Report to [facility] (Your orders are in Connect care at [facility]): PLEASE REPORT AS INSTRUCTED BY [facility]   You may not eat or drink anything after midnight the night before. If required labs and EKG are not completed, surgery will be canceled. Surgery Instructions:    Five days prior to surgery Stop taking Asprin and or anti-inflammatory medications. If you are taking blood thinner medication (such as Coumadin, Plavix, Heparin or others) you will need special instructions from your prescribing physician. Prepare skin for surgery three days prior to surgery using the skin cleanser.  skin cleanser at your H&P appointment with [facility]. Do not eat, chew gum or drink anything after Midnight prior to the date of your surgery. Take a list of all your medications and the dosage with you to the hospital.     Take your regular medications unless otherwise instructions with small sips of water. Do not wear nail polish, make-up or jewelry. Do not bring valuables or money to the hospital.    Bring your s license, your insurance card with you to the [address] [place] [address] post operative appointment is scheduled for 05/26/2022 @ 10:30am . This appointment will be with Alla Peña PA-C.        Kalyani Yeung,        Surgical Coordinator        [phone]

## 2022-05-10 NOTE — PATIENT INSTRUCTIONS
Dr. Ludwig Hoover Total and Reverse Total Shoulder Information    What is the surgery? - This is an inpatient procedure done at [facility] 49 will be completely asleep for procedure. Dr. Ludwig Hoover will make an incision in the front of your shoulder and replace the joint itself   - Total surgery takes about an hour and half   - You will then spend the night to receive prophylactic antibiotics and pain medication if needed. The goal is to send you home the following day and have you begin outpatient physical therapy about 3 days after you are discharged to home    What can you expect after surgery? - You will have a waterproof dressing on your shoulder following surgery. You will be able to shower 2 days after surgery but no soaking in a bath, hot tub, ocean or pool x 2 weeks to allow for full wound healing  - You will be in a sling for 4 weeks. You will wear this sling whenever you are active or up moving around. This sling is to keep you from moving your arm on your own. You are essentially one armed until you are out of your sling. This means no reaching, pulling, grabbing or lifting with the operative arm. It is ok for you to remove your sling when you are sitting in a chair or you are in bed  - Dr. Ludwig Hoover will start physical therapy for you a few days after you are discharged from the hospital. While you cannot move your arm we allow physical therapy to gently move your shoulder. We call this passive range of motion. The goal of this is to decrease your stiffness and in turn decrease your post operative pain. - Plan on being in physical therapy for 10-12 weeks    When can I return to work? - Most patients return to desk work only after 2 weeks. You are able to type but there is no overhead work or lifting  - You will start some gentle lifting up to 5-10lbs at about 8-10 weeks post operatively.  You will gradually increase how much you are able to lift after this point under the guidance of Dr. Ludwig Hoover, his physician assistant and physical therapy. - At 6 months you are able to do all activities as tolerated but it may take you a full 9-12 months to fully recover from your surgery    Not all shoulder replacements are the same. The specifics of your individual case will be discussed at length with you by Dr. Ronaldo Santiago and his Physician Assistant. Danielle James  Surgical Coordinator  [address] [facility] [place] [address] [email or web address]  P: [phone]  F: [phone]

## 2022-05-10 NOTE — PROGRESS NOTES
Roya Martinez  1945   Chief Complaint   Patient presents with    Shoulder Pain     RIGHT        HISTORY OF PRESENT ILLNESS  Roya Martinez is a 68 y.o. female who presents today for reevaluation of right shoulder. She rates her pain 7/10 today. Pain is localized at the top of the shoulder. She notes improvements since last OV. Pain has been present since 3/19/2022 when she was playing soccer with her grandson, slipped and landed on her right shoulder. She was seen at [facility] ED and diagnosed with a fracture of the humeral head with subluxation. Has been taking tramadol and tylenol 500. Minimal relief with ibuprofen and oxycodone. Presents today wearing a sling. Notes the wrist and elbow have improved. Patient denies any fever, chills, chest pain, shortness of breath or calf pain. The remainder of the review of systems is negative. There are no new illness or injuries to report since last seen in the office. There are no changes to medications, allergies, family or social history. PHYSICAL EXAM:   Visit Vitals  Temp 97.3 °F (36.3 °C)   Ht 5' 2\" (1.575 m)   Wt 135 lb (61.2 kg)   BMI 24.69 kg/m²     The patient is a well-developed, well-nourished female   in no acute distress. The patient is alert and oriented times three. The patient is alert and oriented times three. Mood and affect are normal.  LYMPHATIC: lymph nodes are not enlarged and are within normal limits  SKIN: normal in color and non tender to palpation. There are no bruises or abrasions noted. NEUROLOGICAL: Motor sensory exam is within normal limits. Reflexes are equal bilaterally.  There is normal sensation to pinprick and light touch  MUSCULOSKELETAL:  Examination Right shoulder   Skin Intact   AC joint tenderness -   Biceps tenderness -   Forward flexion/Elevation ROM 30   Active abduction ROM 30   Glenohumeral abduction 30   External rotation ROM 30   Internal rotation ROM 30   Apprehension -   Gunners Relocation - Jerk -   Load and Shift -   Obriens -   Speeds -   Impingement sign -   Supraspinatus/Empty Can +   External Rotation Strength -, 5/5   Lift Off/Belly Press -, 5/5   Neurovascular Intact        PROCEDURE: none    IMAGING: XR of the right shoulder with 3 views obtained in the office dated 5/3/2022 was reviewed and read by Dr. Kathy Huerta: no changes in position of the fracture site. significant displacement    XR of the right shoulder with 3 views obtained in the office dated 4/26/2022 was reviewed and r ead by Dr. Kathy Huerta: displaced proximal humerus fracture    XR of the right shoulder with 3 views obtained in the office dated 4/12/2022 was reviewed and read by Dr. Kathy Huerta: comminuted displaced proximal humerus fracture    CT of the right UE dated 3/20/2022 read and reviewed by Dr. Kathy Huerta: Comminuted fracture of the right middle head with impacted humeral neck. Hepatic cysts. XR of the right shoulder with 2 views obtained at [facility] dated 3/19/2022 was reviewed and read by Dr. Kathy Huerta: Comminuted right humeral head and neck fracture with resultant inferior subluxation of the right glenohumeral joint. IMPRESSION:      ICD-10-CM ICD-9-CM    1. Closed fracture of head of right humerus, initial encounter  S42.291A 812.09         PLAN:  1. I discussed the risks and benefits and potential adverse outcomes of both operative vs non operative treatment of right shoulder displaced humerus fracture with the patient and patient wishes to proceed with right shoulder reverse total shoulder arthroplasty. Risks of operative intervention include but not limited to bleeding, infection, deep vein thrombosis, pulmonary embolism, death, limb length discrepancy, reflexive sympathetic dystrophy, fat embolism syndrome,damage to blood vessels and nerves, malunion, non-union, delayed union, failure of hardware, post traumatic arthritis, stroke, heart attack, and death.       Patient understands that infection may arise and may require numerous surgeries. The patient was counseled at length about the risks of sherry Covid-19 during their perioperative period and any recovery window from their procedure. The patient was made aware that sherry Covid-19  may worsen their prognosis for recovering from their procedure and lend to a higher morbidity and/or mortality risk. All material risks, benefits, and reasonable alternatives including postponing the procedure were discussed. The patient does  wish to proceed with the procedure at this time. History and physical exam done today    The above patient has failed conservative treatments including injections, NSAIDS (or could not tolerated NSAIDS), and a physician directed exercise program.     Risk factors include: hx of arthritis   2. No ultrasound exam indicated today  3. No cortisone injection indicated today   4. No Physical/Occupational Therapy indicated today  5. No diagnostic test indicated today:   6. No durable medical equipment indicated today   7. No referral indicated today   8. No medications indicated today:   9. No Narcotic indicated today       RTC after surgery      Scribed by [facility] [address]) as dictated by Lauren Black MD    I, Dr. Lauren Black, confirm that all documentation is accurate.     Lauren Black M.D.   [facility]

## 2022-05-11 ENCOUNTER — HOSPITAL ENCOUNTER (OUTPATIENT)
Dept: PREADMISSION TESTING | Age: 77
Discharge: HOME | End: 2022-05-11
Payer: MEDICARE

## 2022-05-11 ENCOUNTER — HOSPITAL ENCOUNTER (OUTPATIENT)
Dept: LAB | Age: 77
Discharge: HOME | End: 2022-05-11

## 2022-05-11 ENCOUNTER — HOSPITAL ENCOUNTER (OUTPATIENT)
Dept: GENERAL RADIOLOGY | Age: 77
Discharge: HOME | End: 2022-05-11
Payer: MEDICARE

## 2022-05-11 DIAGNOSIS — S42.291A CLOSED FRACTURE OF HEAD OF RIGHT HUMERUS, INITIAL ENCOUNTER: ICD-10-CM

## 2022-05-11 DIAGNOSIS — Z01.818 PRE-OP TESTING: ICD-10-CM

## 2022-05-11 LAB
ABO + RH BLD: NORMAL
ATRIAL RATE: 86 BPM
BLOOD GROUP ANTIBODIES SERPL: NORMAL
CALCULATED P AXIS, ECG09: 24 DEGREES
CALCULATED R AXIS, ECG10: -11 DEGREES
CALCULATED T AXIS, ECG11: 27 DEGREES
DIAGNOSIS, 93000: NORMAL
P-R INTERVAL, ECG05: 142 MS
Q-T INTERVAL, ECG07: 366 MS
QRS DURATION, ECG06: 74 MS
QTC CALCULATION (BEZET), ECG08: 437 MS
SENTARA SPECIMEN COL,SENBCF: NORMAL
SPECIMEN EXP DATE BLD: NORMAL
VENTRICULAR RATE, ECG03: 86 BPM

## 2022-05-11 PROCEDURE — 71046 X-RAY EXAM CHEST 2 VIEWS: CPT

## 2022-05-11 PROCEDURE — 93005 ELECTROCARDIOGRAM TRACING: CPT

## 2022-05-11 PROCEDURE — 86900 BLOOD TYPING SEROLOGIC ABO: CPT

## 2022-05-11 PROCEDURE — 99001 SPECIMEN HANDLING PT-LAB: CPT

## 2022-05-12 LAB
ABO + RH BLD: NORMAL
ABSOLUTE LYMPHOCYTE COUNT, 10803: 1.4 K/UL (ref 1–4.8)
ANION GAP SERPL CALC-SCNC: 12 MMOL/L (ref 3–15)
ANTIBODY SCREEN INTERPRETATION, 14017: NEGATIVE
APTT PPP: 27 SEC (ref 22–36)
BASOPHILS # BLD: 0 K/UL (ref 0–0.2)
BASOPHILS NFR BLD: 1 % (ref 0–2)
BILIRUB UR QL: NEGATIVE
BUN SERPL-MCNC: 17 MG/DL (ref 6–22)
CALCIUM SERPL-MCNC: 10.1 MG/DL (ref 8.4–10.5)
CHLORIDE SERPL-SCNC: 96 MMOL/L (ref 98–110)
CLARITY: CLEAR
CO2 SERPL-SCNC: 25 MMOL/L (ref 20–32)
COLOR UR: YELLOW
CREAT SERPL-MCNC: 0.7 MG/DL (ref 0.8–1.4)
EOSINOPHIL # BLD: 0.1 K/UL (ref 0–0.5)
EOSINOPHIL NFR BLD: 1 % (ref 0–6)
EPITHELIAL,EPSU: ABNORMAL /HPF (ref 0–2)
ERYTHROCYTE [DISTWIDTH] IN BLOOD BY AUTOMATED COUNT: 12.4 % (ref 10–15.5)
GFRAA, 66117: >60
GFRNA, 66118: >60
GLUCOSE SERPL-MCNC: 94 MG/DL (ref 70–99)
GLUCOSE UR QL: NEGATIVE MG/DL
GRANULOCYTES,GRANS: 65 % (ref 40–75)
HCT VFR BLD AUTO: 41.4 % (ref 35.1–48.3)
HGB BLD-MCNC: 13.5 G/DL (ref 11.7–16.1)
HGB UR QL STRIP: NEGATIVE
HYLINE CAST, 6014: ABNORMAL /LPF (ref 0–2)
INR PPP: 0.98 (ref 0.89–1.29)
KETONES UR QL STRIP.AUTO: ABNORMAL MG/DL
LEUKOCYTE ESTERASE: ABNORMAL
LYMPHOCYTES, LYMLT: 23 % (ref 20–45)
MCH RBC QN AUTO: 32 PG (ref 26–34)
MCHC RBC AUTO-ENTMCNC: 33 G/DL (ref 31–36)
MCV RBC AUTO: 97 FL (ref 80–99)
MONOCYTES # BLD: 0.6 K/UL (ref 0.1–1)
MONOCYTES NFR BLD: 10 % (ref 3–12)
NEUTROPHILS # BLD AUTO: 3.9 K/UL (ref 1.8–7.7)
NITRITE UR QL STRIP.AUTO: NEGATIVE
PH UR STRIP: 5 PH (ref 5–8)
PLATELET # BLD AUTO: 273 K/UL (ref 140–440)
PMV BLD AUTO: 10.4 FL (ref 9–13)
POTASSIUM SERPL-SCNC: 3.9 MMOL/L (ref 3.5–5.5)
PROT UR QL STRIP: NEGATIVE MG/DL
PROTHROMBIN TIME: 10.6 SEC (ref 9–13)
RBC # BLD AUTO: 4.25 M/UL (ref 3.8–5.2)
RBC #/AREA URNS HPF: NEGATIVE /HPF
SODIUM SERPL-SCNC: 133 MMOL/L (ref 133–145)
SP GR UR: 1.02 (ref 1–1.03)
URINE ASCORBIC ACID: NEGATIVE MG/DL
UROBILINOGEN UR STRIP-MCNC: <2 MG/DL
WBC # BLD AUTO: 5.9 K/UL (ref 4–11)
WBC URNS QL MICRO: ABNORMAL /HPF (ref 0–5)

## 2022-05-17 RX ORDER — CARBOXYMETHYLCELLULOSE SODIUM 10 MG/ML
1 GEL OPHTHALMIC AS NEEDED
COMMUNITY

## 2022-05-17 RX ORDER — HYDROGEN PEROXIDE 3 %
20 SOLUTION, NON-ORAL MISCELLANEOUS DAILY
COMMUNITY

## 2022-05-17 NOTE — DISCHARGE INSTRUCTIONS
Dr. Breezy Li Total Shoulder Postoperative Information    How to manage your pain after your Surgery:  After your surgery it is expected that you will have some pain. In efforts to reduce the amounts of side effects from pain medications we would like you to follow the below medication regimen after surgery:  1. Take 1000mg of Tylenol by mouth every 8 hours x 5 days. This is 4 tabs of regular strength Tylenol or 2 tabs of Extra Strength Tylenol  2. We would like you to take a NSAID medication for the first 3 days following surgery. In efforts to avoid additional prescription costs we recommend one of the following over the counter medications. 600mg of Ibuprofen every 8 hours x 3 days    OR   500mg of Naproxen (Aleve) every 12 hours x 3 days  If you have a prescription for Meloxicam or Celebrex already you may resume this as previously prescribed instead of the Over the Counter Medications. DO NOT TAKE ANY OF THESE IF YOU HAVE CHRONIC RENAL FAILURE, ARE TAKING A BLOOD THINNER, HAVE A HISTORY OF A GASTRIC BLEED OR ARE ALLERGIC TO ASPIRIN. IT IS OK TO TAKE IF YOU HAVE HISTORY OF GASTRIC BYPASS SURGERY. 3. Then ONLY IF YOUR PAIN IS UNCONTROLLED you may take your Narcotic Pain medication as prescribed. THIS IS THE PRESCRIPTION THAT WAS GIVEN TO YOU IN THE OFFICE. You should place an ice bag over your shoulder to help with pain and reduce swelling. Your shoulder may be sore and develop bruising over the next several days. The bruising should resolve and no special care will be needed. Leave your bandage on until we see you in the office next week. It is safe to take a shower when you get home    You will be given a sling to use. Continue to wear this while you are active or up and moving around. OK to take off if you are sedentary. No moving the arm away from your body on your own, reaching or carrying with the operated arm. There has been an operation inside and around the shoulder joint. Complete healing may take weeks or several months. If you have a high temperature, unexpected pain, redness or swelling in your shoulder please contact my office immediately. Please call to make an appointment to see me in my office in 1 week.      Dr. Sylwia Weber office number [phone]

## 2022-05-17 NOTE — PERIOP NOTES
PRE-SURGICAL INSTRUCTIONS        Patient's Name:  Navdeep HARRIS Date:  5/17/2022            Covid Testing Date and Time:    Surgery Date:  5/20/2022                1. Do NOT eat or drink anything, including candy, gum, or ice chips after midnight on 05/19, unless you have specific instructions from your surgeon or anesthesia provider to do so.  2. You may brush your teeth before coming to the hospital.  3. No smoking 24 hours prior to the day of surgery. 4. No alcohol 24 hours prior to the day of surgery. 5. No recreational drugs for one week prior to the day of surgery. 6. Leave all valuables, including money/purse, at home. 7. Remove all jewelry, nail polish, acrylic nails, and makeup (including mascara); no lotions powders, deodorant, or perfume/cologne/after shave on the skin. 8. Follow instruction for Hibiclens washes and CHG wipes from surgeon's office. 9. Glasses/contact lenses and dentures may be worn to the hospital.  They will be removed prior to surgery. 10. Call your doctor if symptoms of a cold or illness develop within 24-48 hours prior to your surgery. 11.  If you are having an outpatient procedure, please make arrangements for a responsible ADULT TO [address] [place] [address] and stay with you for 24 hours after your surgery. 12. ONE VISITOR in the hospital at this time for outpatient procedures. Exceptions may be made for surgical admissions, per nursing unit guidelines      Special Instructions:      Bring list of CURRENT medications. Bring any pertinent legal medical records. Take these medications the morning of surgery with a sip of water:  Levothyroxine      On the day of surgery, come in the main entrance of [facility]. Let the  at the desk know you are there for surgery. A staff member will come escort you to the surgical area on the second floor.     If you have any questions or concerns, please do not hesitate to call:     (Prior to the day of surgery) [facility] department:  [phone]   (Day of surgery) Pre-Op department:  [phone]    These surgical instructions were reviewed with Silver Form during the [facility] phone call.

## 2022-05-19 ENCOUNTER — ANESTHESIA EVENT (OUTPATIENT)
Dept: SURGERY | Age: 77
DRG: 483 | End: 2022-05-19
Payer: MEDICARE

## 2022-05-20 ENCOUNTER — HOSPITAL ENCOUNTER (INPATIENT)
Age: 77
LOS: 4 days | Discharge: HOME | DRG: 483 | End: 2022-05-24
Attending: ORTHOPAEDIC SURGERY | Admitting: ORTHOPAEDIC SURGERY
Payer: MEDICARE

## 2022-05-20 ENCOUNTER — ANESTHESIA (OUTPATIENT)
Dept: SURGERY | Age: 77
DRG: 483 | End: 2022-05-20
Payer: MEDICARE

## 2022-05-20 DIAGNOSIS — S42.291A CLOSED FRACTURE OF HEAD OF RIGHT HUMERUS, INITIAL ENCOUNTER: ICD-10-CM

## 2022-05-20 PROBLEM — S42.201A CLOSED FRACTURE OF RIGHT PROXIMAL HUMERUS: Status: ACTIVE | Noted: 2022-05-20

## 2022-05-20 PROCEDURE — 74011000250 HC RX REV CODE- 250: Performed by: NURSE ANESTHETIST, CERTIFIED REGISTERED

## 2022-05-20 PROCEDURE — 0RRJ00Z REPLACEMENT OF RIGHT SHOULDER JOINT WITH REVERSE BALL AND SOCKET SYNTHETIC SUBSTITUTE, OPEN APPROACH: ICD-10-PCS | Performed by: ORTHOPAEDIC SURGERY

## 2022-05-20 PROCEDURE — 23472 RECONSTRUCT SHOULDER JOINT: CPT | Performed by: ORTHOPAEDIC SURGERY

## 2022-05-20 PROCEDURE — 01638 ANES OPN/ARTHR TOT SHO RPLCM: CPT | Performed by: ANESTHESIOLOGY

## 2022-05-20 PROCEDURE — 76210000016 HC OR PH I REC 1 TO 1.5 HR: Performed by: ORTHOPAEDIC SURGERY

## 2022-05-20 PROCEDURE — 77030040922 HC BLNKT HYPOTHRM STRY -A: Performed by: ORTHOPAEDIC SURGERY

## 2022-05-20 PROCEDURE — 88304 TISSUE EXAM BY PATHOLOGIST: CPT

## 2022-05-20 PROCEDURE — 77030002922 HC SUT FBRWRE ARTH -B: Performed by: ORTHOPAEDIC SURGERY

## 2022-05-20 PROCEDURE — 65270000029 HC RM PRIVATE

## 2022-05-20 PROCEDURE — 77030013708 HC HNDPC SUC IRR PULS STRY –B: Performed by: ORTHOPAEDIC SURGERY

## 2022-05-20 PROCEDURE — 77030008683 HC TU ET CUF COVD -A: Performed by: ANESTHESIOLOGY

## 2022-05-20 PROCEDURE — 74011250636 HC RX REV CODE- 250/636: Performed by: NURSE ANESTHETIST, CERTIFIED REGISTERED

## 2022-05-20 PROCEDURE — 64450 NJX AA&/STRD OTHER PN/BRANCH: CPT | Performed by: ANESTHESIOLOGY

## 2022-05-20 PROCEDURE — 77030006835 HC BLD SAW SAG STRY -B: Performed by: ORTHOPAEDIC SURGERY

## 2022-05-20 PROCEDURE — 64415 NJX AA&/STRD BRCH PLXS IMG: CPT | Performed by: ANESTHESIOLOGY

## 2022-05-20 PROCEDURE — C9290 INJ, BUPIVACAINE LIPOSOME: HCPCS | Performed by: ORTHOPAEDIC SURGERY

## 2022-05-20 PROCEDURE — 99100 ANES PT EXTEME AGE<1 YR&>70: CPT | Performed by: NURSE ANESTHETIST, CERTIFIED REGISTERED

## 2022-05-20 PROCEDURE — 74011250637 HC RX REV CODE- 250/637: Performed by: NURSE ANESTHETIST, CERTIFIED REGISTERED

## 2022-05-20 PROCEDURE — 77030026091 HC NDL SUT TAPR HMRK -A: Performed by: ORTHOPAEDIC SURGERY

## 2022-05-20 PROCEDURE — 77030031139 HC SUT VCRL2 J&J -A: Performed by: ORTHOPAEDIC SURGERY

## 2022-05-20 PROCEDURE — 01638 ANES OPN/ARTHR TOT SHO RPLCM: CPT | Performed by: NURSE ANESTHETIST, CERTIFIED REGISTERED

## 2022-05-20 PROCEDURE — 76942 ECHO GUIDE FOR BIOPSY: CPT | Performed by: ANESTHESIOLOGY

## 2022-05-20 PROCEDURE — 2709999900 HC NON-CHARGEABLE SUPPLY: Performed by: ORTHOPAEDIC SURGERY

## 2022-05-20 PROCEDURE — 74011000250 HC RX REV CODE- 250: Performed by: PHYSICIAN ASSISTANT

## 2022-05-20 PROCEDURE — 76010000134 HC OR TIME 3.5 TO 4 HR: Performed by: ORTHOPAEDIC SURGERY

## 2022-05-20 PROCEDURE — 74011250636 HC RX REV CODE- 250/636: Performed by: PHYSICIAN ASSISTANT

## 2022-05-20 PROCEDURE — 97530 THERAPEUTIC ACTIVITIES: CPT

## 2022-05-20 PROCEDURE — 74011250636 HC RX REV CODE- 250/636: Performed by: ANESTHESIOLOGY

## 2022-05-20 PROCEDURE — 88311 DECALCIFY TISSUE: CPT

## 2022-05-20 PROCEDURE — C1776 JOINT DEVICE (IMPLANTABLE): HCPCS | Performed by: ORTHOPAEDIC SURGERY

## 2022-05-20 PROCEDURE — 77030019605: Performed by: ORTHOPAEDIC SURGERY

## 2022-05-20 PROCEDURE — 76060000038 HC ANESTHESIA 3.5 TO 4 HR: Performed by: ORTHOPAEDIC SURGERY

## 2022-05-20 PROCEDURE — 97162 PT EVAL MOD COMPLEX 30 MIN: CPT

## 2022-05-20 PROCEDURE — 99100 ANES PT EXTEME AGE<1 YR&>70: CPT | Performed by: ANESTHESIOLOGY

## 2022-05-20 PROCEDURE — 77030040361 HC SLV COMPR DVT MDII -B: Performed by: ORTHOPAEDIC SURGERY

## 2022-05-20 PROCEDURE — 77030018836 HC SOL IRR NACL ICUM -A: Performed by: ORTHOPAEDIC SURGERY

## 2022-05-20 PROCEDURE — 77030020268 HC MISC GENERAL SUPPLY: Performed by: ORTHOPAEDIC SURGERY

## 2022-05-20 PROCEDURE — 77030003598 HC NDL MULT/FIRE ARTH -C: Performed by: ORTHOPAEDIC SURGERY

## 2022-05-20 PROCEDURE — 77030012407 HC DRN WND BARD -B: Performed by: ORTHOPAEDIC SURGERY

## 2022-05-20 PROCEDURE — 77030031367: Performed by: ORTHOPAEDIC SURGERY

## 2022-05-20 PROCEDURE — 74011000258 HC RX REV CODE- 258: Performed by: ORTHOPAEDIC SURGERY

## 2022-05-20 PROCEDURE — 77030003666 HC NDL SPINAL BD -A: Performed by: ORTHOPAEDIC SURGERY

## 2022-05-20 PROCEDURE — 74011250637 HC RX REV CODE- 250/637: Performed by: PHYSICIAN ASSISTANT

## 2022-05-20 PROCEDURE — 77030013079 HC BLNKT BAIR HGGR 3M -A: Performed by: ANESTHESIOLOGY

## 2022-05-20 PROCEDURE — 74011250636 HC RX REV CODE- 250/636: Performed by: ORTHOPAEDIC SURGERY

## 2022-05-20 PROCEDURE — 77030039266 HC ADH SKN EXOFIN S2SG -A: Performed by: ORTHOPAEDIC SURGERY

## 2022-05-20 DEVICE — CUP HUM DIA36MM +2MM OFFSET R SHLDR SUT UNIVERS REVERS: Type: IMPLANTABLE DEVICE | Site: SHOULDER | Status: FUNCTIONAL

## 2022-05-20 DEVICE — SCREW GLEN FIX 20MM SHLDR CTRL MOD UNIVERS REVERS: Type: IMPLANTABLE DEVICE | Site: SHOULDER | Status: FUNCTIONAL

## 2022-05-20 DEVICE — SCREW BNE L20MM DIA45MM PERIPH NONLOCKING FOR MOD GLEN SYS: Type: IMPLANTABLE DEVICE | Site: SHOULDER | Status: FUNCTIONAL

## 2022-05-20 DEVICE — SPACER CEM DIA36MM +6MM OFFSET HUM TI UNIVERS REVERS: Type: IMPLANTABLE DEVICE | Site: SHOULDER | Status: FUNCTIONAL

## 2022-05-20 DEVICE — SPHERE GLEN DIA33MM +4 BASEPLT 24MM SHLDR LAT TAPR MOD: Type: IMPLANTABLE DEVICE | Site: SHOULDER | Status: FUNCTIONAL

## 2022-05-20 DEVICE — STEM HUM SZ 5 SHLDR COAT CAP UNIVERS REVERS: Type: IMPLANTABLE DEVICE | Site: SHOULDER | Status: FUNCTIONAL

## 2022-05-20 DEVICE — BASEPLATE GLEN 24MM SHLDR MOD UNIVERS REVERS: Type: IMPLANTABLE DEVICE | Site: SHOULDER | Status: FUNCTIONAL

## 2022-05-20 DEVICE — INSERT HUM 36MM +3 33MM SHLDR COMB: Type: IMPLANTABLE DEVICE | Site: SHOULDER | Status: FUNCTIONAL

## 2022-05-20 DEVICE — COMPONENT TOT SHLDR CAPPED S3ARTHREX] ARTHREX INC]: Type: IMPLANTABLE DEVICE | Site: SHOULDER | Status: FUNCTIONAL

## 2022-05-20 DEVICE — SCREW BNE L28MM DIA5.5MM PERIPH LOK FOR MOD GLEN SYS: Type: IMPLANTABLE DEVICE | Site: SHOULDER | Status: FUNCTIONAL

## 2022-05-20 RX ORDER — HYDROMORPHONE HYDROCHLORIDE 1 MG/ML
1-2 INJECTION, SOLUTION INTRAMUSCULAR; INTRAVENOUS; SUBCUTANEOUS
Status: DISCONTINUED | OUTPATIENT
Start: 2022-05-20 | End: 2022-05-24 | Stop reason: HOSPADM

## 2022-05-20 RX ORDER — MIDAZOLAM HYDROCHLORIDE 1 MG/ML
2 INJECTION, SOLUTION INTRAMUSCULAR; INTRAVENOUS ONCE
Status: DISCONTINUED | OUTPATIENT
Start: 2022-05-20 | End: 2022-05-20 | Stop reason: HOSPADM

## 2022-05-20 RX ORDER — ROPIVACAINE HYDROCHLORIDE 5 MG/ML
INJECTION, SOLUTION EPIDURAL; INFILTRATION; PERINEURAL
Status: COMPLETED | OUTPATIENT
Start: 2022-05-20 | End: 2022-05-20

## 2022-05-20 RX ORDER — WATER FOR INJECTION,STERILE
VIAL (ML) INJECTION AS NEEDED
Status: DISCONTINUED | OUTPATIENT
Start: 2022-05-20 | End: 2022-05-20 | Stop reason: HOSPADM

## 2022-05-20 RX ORDER — POLYETHYLENE GLYCOL 3350 17 G/17G
17 POWDER, FOR SOLUTION ORAL DAILY
Status: DISCONTINUED | OUTPATIENT
Start: 2022-05-21 | End: 2022-05-22

## 2022-05-20 RX ORDER — SODIUM CHLORIDE 9 MG/ML
500 INJECTION, SOLUTION INTRAVENOUS ONCE
Status: COMPLETED | OUTPATIENT
Start: 2022-05-20 | End: 2022-05-20

## 2022-05-20 RX ORDER — SODIUM CHLORIDE, SODIUM LACTATE, POTASSIUM CHLORIDE, CALCIUM CHLORIDE 600; 310; 30; 20 MG/100ML; MG/100ML; MG/100ML; MG/100ML
25 INJECTION, SOLUTION INTRAVENOUS CONTINUOUS
Status: DISCONTINUED | OUTPATIENT
Start: 2022-05-20 | End: 2022-05-20 | Stop reason: HOSPADM

## 2022-05-20 RX ORDER — PROCHLORPERAZINE EDISYLATE 5 MG/ML
5 INJECTION INTRAMUSCULAR; INTRAVENOUS
Status: DISCONTINUED | OUTPATIENT
Start: 2022-05-20 | End: 2022-05-22

## 2022-05-20 RX ORDER — EPHEDRINE SULFATE/0.9% NACL/PF 25 MG/5 ML
SYRINGE (ML) INTRAVENOUS AS NEEDED
Status: DISCONTINUED | OUTPATIENT
Start: 2022-05-20 | End: 2022-05-20 | Stop reason: HOSPADM

## 2022-05-20 RX ORDER — HYDROCODONE BITARTRATE AND ACETAMINOPHEN 7.5; 325 MG/1; MG/1
1-2 TABLET ORAL
Status: DISCONTINUED | OUTPATIENT
Start: 2022-05-20 | End: 2022-05-24 | Stop reason: HOSPADM

## 2022-05-20 RX ORDER — ROCURONIUM BROMIDE 10 MG/ML
INJECTION, SOLUTION INTRAVENOUS AS NEEDED
Status: DISCONTINUED | OUTPATIENT
Start: 2022-05-20 | End: 2022-05-20 | Stop reason: HOSPADM

## 2022-05-20 RX ORDER — GABAPENTIN 300 MG/1
300 CAPSULE ORAL
Status: DISCONTINUED | OUTPATIENT
Start: 2022-05-20 | End: 2022-05-24 | Stop reason: HOSPADM

## 2022-05-20 RX ORDER — SUCCINYLCHOLINE CHLORIDE 20 MG/ML
INJECTION INTRAMUSCULAR; INTRAVENOUS AS NEEDED
Status: DISCONTINUED | OUTPATIENT
Start: 2022-05-20 | End: 2022-05-20 | Stop reason: HOSPADM

## 2022-05-20 RX ORDER — FAMOTIDINE 20 MG/1
20 TABLET, FILM COATED ORAL ONCE
Status: COMPLETED | OUTPATIENT
Start: 2022-05-20 | End: 2022-05-20

## 2022-05-20 RX ORDER — NALOXONE HYDROCHLORIDE 0.4 MG/ML
1 INJECTION, SOLUTION INTRAMUSCULAR; INTRAVENOUS; SUBCUTANEOUS
Status: ACTIVE | OUTPATIENT
Start: 2022-05-20 | End: 2022-05-21

## 2022-05-20 RX ORDER — GABAPENTIN 400 MG/1
400 CAPSULE ORAL ONCE
Status: COMPLETED | OUTPATIENT
Start: 2022-05-20 | End: 2022-05-20

## 2022-05-20 RX ORDER — AMOXICILLIN 250 MG
1 CAPSULE ORAL DAILY
Status: DISCONTINUED | OUTPATIENT
Start: 2022-05-21 | End: 2022-05-23

## 2022-05-20 RX ORDER — ONDANSETRON 2 MG/ML
INJECTION INTRAMUSCULAR; INTRAVENOUS AS NEEDED
Status: DISCONTINUED | OUTPATIENT
Start: 2022-05-20 | End: 2022-05-20 | Stop reason: HOSPADM

## 2022-05-20 RX ORDER — PHENYLEPHRINE HCL IN 0.9% NACL 1 MG/10 ML
SYRINGE (ML) INTRAVENOUS AS NEEDED
Status: DISCONTINUED | OUTPATIENT
Start: 2022-05-20 | End: 2022-05-20 | Stop reason: HOSPADM

## 2022-05-20 RX ORDER — FENTANYL CITRATE 50 UG/ML
50 INJECTION, SOLUTION INTRAMUSCULAR; INTRAVENOUS AS NEEDED
Status: DISCONTINUED | OUTPATIENT
Start: 2022-05-20 | End: 2022-05-20 | Stop reason: HOSPADM

## 2022-05-20 RX ORDER — BUPIVACAINE HYDROCHLORIDE 5 MG/ML
30 INJECTION, SOLUTION EPIDURAL; INTRACAUDAL
Status: COMPLETED | OUTPATIENT
Start: 2022-05-20 | End: 2022-05-20

## 2022-05-20 RX ORDER — ONDANSETRON 2 MG/ML
8 INJECTION INTRAMUSCULAR; INTRAVENOUS
Status: DISCONTINUED | OUTPATIENT
Start: 2022-05-20 | End: 2022-05-24 | Stop reason: HOSPADM

## 2022-05-20 RX ORDER — LIDOCAINE HYDROCHLORIDE 10 MG/ML
0.1 INJECTION, SOLUTION EPIDURAL; INFILTRATION; INTRACAUDAL; PERINEURAL AS NEEDED
Status: DISCONTINUED | OUTPATIENT
Start: 2022-05-20 | End: 2022-05-20 | Stop reason: HOSPADM

## 2022-05-20 RX ORDER — MIDAZOLAM HYDROCHLORIDE 1 MG/ML
INJECTION, SOLUTION INTRAMUSCULAR; INTRAVENOUS AS NEEDED
Status: DISCONTINUED | OUTPATIENT
Start: 2022-05-20 | End: 2022-05-20 | Stop reason: HOSPADM

## 2022-05-20 RX ORDER — FENTANYL CITRATE 50 UG/ML
INJECTION, SOLUTION INTRAMUSCULAR; INTRAVENOUS AS NEEDED
Status: DISCONTINUED | OUTPATIENT
Start: 2022-05-20 | End: 2022-05-20 | Stop reason: HOSPADM

## 2022-05-20 RX ORDER — ACETAMINOPHEN 500 MG
1000 TABLET ORAL ONCE
Status: COMPLETED | OUTPATIENT
Start: 2022-05-20 | End: 2022-05-20

## 2022-05-20 RX ORDER — LEVOTHYROXINE SODIUM 50 UG/1
50 TABLET ORAL
Status: DISCONTINUED | OUTPATIENT
Start: 2022-05-21 | End: 2022-05-24 | Stop reason: HOSPADM

## 2022-05-20 RX ORDER — FLUTICASONE PROPIONATE 50 MCG
1 SPRAY, SUSPENSION (ML) NASAL DAILY
Status: DISCONTINUED | OUTPATIENT
Start: 2022-05-21 | End: 2022-05-24 | Stop reason: HOSPADM

## 2022-05-20 RX ORDER — PANTOPRAZOLE SODIUM 40 MG/1
40 TABLET, DELAYED RELEASE ORAL
Status: DISCONTINUED | OUTPATIENT
Start: 2022-05-20 | End: 2022-05-24 | Stop reason: HOSPADM

## 2022-05-20 RX ORDER — CEFAZOLIN SODIUM 1 G/3ML
INJECTION, POWDER, FOR SOLUTION INTRAMUSCULAR; INTRAVENOUS AS NEEDED
Status: DISCONTINUED | OUTPATIENT
Start: 2022-05-20 | End: 2022-05-20 | Stop reason: HOSPADM

## 2022-05-20 RX ORDER — FENTANYL CITRATE 50 UG/ML
100 INJECTION, SOLUTION INTRAMUSCULAR; INTRAVENOUS ONCE
Status: COMPLETED | OUTPATIENT
Start: 2022-05-20 | End: 2022-05-20

## 2022-05-20 RX ORDER — ONDANSETRON 2 MG/ML
4 INJECTION INTRAMUSCULAR; INTRAVENOUS ONCE
Status: DISCONTINUED | OUTPATIENT
Start: 2022-05-20 | End: 2022-05-20 | Stop reason: HOSPADM

## 2022-05-20 RX ORDER — LIDOCAINE HYDROCHLORIDE 20 MG/ML
INJECTION, SOLUTION EPIDURAL; INFILTRATION; INTRACAUDAL; PERINEURAL AS NEEDED
Status: DISCONTINUED | OUTPATIENT
Start: 2022-05-20 | End: 2022-05-20 | Stop reason: HOSPADM

## 2022-05-20 RX ORDER — SODIUM CHLORIDE, SODIUM LACTATE, POTASSIUM CHLORIDE, CALCIUM CHLORIDE 600; 310; 30; 20 MG/100ML; MG/100ML; MG/100ML; MG/100ML
50 INJECTION, SOLUTION INTRAVENOUS CONTINUOUS
Status: DISCONTINUED | OUTPATIENT
Start: 2022-05-20 | End: 2022-05-20 | Stop reason: HOSPADM

## 2022-05-20 RX ORDER — PROPOFOL 10 MG/ML
INJECTION, EMULSION INTRAVENOUS AS NEEDED
Status: DISCONTINUED | OUTPATIENT
Start: 2022-05-20 | End: 2022-05-20 | Stop reason: HOSPADM

## 2022-05-20 RX ORDER — DEXAMETHASONE SODIUM PHOSPHATE 4 MG/ML
INJECTION, SOLUTION INTRA-ARTICULAR; INTRALESIONAL; INTRAMUSCULAR; INTRAVENOUS; SOFT TISSUE AS NEEDED
Status: DISCONTINUED | OUTPATIENT
Start: 2022-05-20 | End: 2022-05-20 | Stop reason: HOSPADM

## 2022-05-20 RX ADMIN — GABAPENTIN 300 MG: 300 CAPSULE ORAL at 21:07

## 2022-05-20 RX ADMIN — Medication 5 MG: at 09:32

## 2022-05-20 RX ADMIN — SODIUM CHLORIDE, SODIUM LACTATE, POTASSIUM CHLORIDE, AND CALCIUM CHLORIDE 25 ML/HR: 600; 310; 30; 20 INJECTION, SOLUTION INTRAVENOUS at 06:49

## 2022-05-20 RX ADMIN — Medication 50 MCG: at 08:56

## 2022-05-20 RX ADMIN — PROPOFOL 100 MG: 10 INJECTION, EMULSION INTRAVENOUS at 07:41

## 2022-05-20 RX ADMIN — Medication 100 MCG: at 10:44

## 2022-05-20 RX ADMIN — CEFAZOLIN 2 G: 330 INJECTION, POWDER, FOR SOLUTION INTRAMUSCULAR; INTRAVENOUS at 07:52

## 2022-05-20 RX ADMIN — Medication 100 MCG: at 10:25

## 2022-05-20 RX ADMIN — Medication 50 MCG: at 09:58

## 2022-05-20 RX ADMIN — ROPIVACAINE HYDROCHLORIDE 30 ML: 5 INJECTION, SOLUTION EPIDURAL; INFILTRATION; PERINEURAL at 07:32

## 2022-05-20 RX ADMIN — Medication 5 MG: at 08:56

## 2022-05-20 RX ADMIN — ACETAMINOPHEN 1000 MG: 500 TABLET ORAL at 06:32

## 2022-05-20 RX ADMIN — FENTANYL CITRATE 25 MCG: 50 INJECTION, SOLUTION INTRAMUSCULAR; INTRAVENOUS at 10:57

## 2022-05-20 RX ADMIN — Medication 50 MCG: at 10:08

## 2022-05-20 RX ADMIN — FENTANYL CITRATE 50 MCG: 50 INJECTION, SOLUTION INTRAMUSCULAR; INTRAVENOUS at 07:41

## 2022-05-20 RX ADMIN — SUCCINYLCHOLINE CHLORIDE 80 MG: 20 INJECTION, SOLUTION INTRAMUSCULAR; INTRAVENOUS at 07:41

## 2022-05-20 RX ADMIN — FAMOTIDINE 20 MG: 20 TABLET ORAL at 06:32

## 2022-05-20 RX ADMIN — SODIUM CHLORIDE 500 ML: 900 INJECTION, SOLUTION INTRAVENOUS at 16:00

## 2022-05-20 RX ADMIN — GABAPENTIN 400 MG: 400 CAPSULE ORAL at 06:32

## 2022-05-20 RX ADMIN — ROCURONIUM BROMIDE 5 MG: 50 INJECTION INTRAVENOUS at 07:41

## 2022-05-20 RX ADMIN — PANTOPRAZOLE SODIUM 40 MG: 40 TABLET, DELAYED RELEASE ORAL at 17:30

## 2022-05-20 RX ADMIN — Medication 50 MCG: at 08:26

## 2022-05-20 RX ADMIN — FENTANYL CITRATE 50 MCG: 50 INJECTION, SOLUTION INTRAMUSCULAR; INTRAVENOUS at 07:26

## 2022-05-20 RX ADMIN — Medication 50 MCG: at 08:12

## 2022-05-20 RX ADMIN — LIDOCAINE HYDROCHLORIDE 60 MG: 20 INJECTION, SOLUTION EPIDURAL; INFILTRATION; INTRACAUDAL; PERINEURAL at 07:41

## 2022-05-20 RX ADMIN — Medication 50 MCG: at 09:41

## 2022-05-20 RX ADMIN — MIDAZOLAM HYDROCHLORIDE 1 MG: 2 INJECTION, SOLUTION INTRAMUSCULAR; INTRAVENOUS at 07:38

## 2022-05-20 RX ADMIN — Medication 5 MG: at 09:13

## 2022-05-20 RX ADMIN — Medication 5 MG: at 08:11

## 2022-05-20 RX ADMIN — WATER 2 G: 1 INJECTION INTRAMUSCULAR; INTRAVENOUS; SUBCUTANEOUS at 17:30

## 2022-05-20 RX ADMIN — Medication 5 MG: at 08:26

## 2022-05-20 RX ADMIN — DEXAMETHASONE SODIUM PHOSPHATE 4 MG: 4 INJECTION, SOLUTION INTRAMUSCULAR; INTRAVENOUS at 08:05

## 2022-05-20 RX ADMIN — WATER 20 ML: 1 INJECTION INTRAMUSCULAR; INTRAVENOUS; SUBCUTANEOUS at 07:52

## 2022-05-20 RX ADMIN — SODIUM CHLORIDE, SODIUM LACTATE, POTASSIUM CHLORIDE, AND CALCIUM CHLORIDE: 600; 310; 30; 20 INJECTION, SOLUTION INTRAVENOUS at 09:53

## 2022-05-20 RX ADMIN — FENTANYL CITRATE 25 MCG: 50 INJECTION, SOLUTION INTRAMUSCULAR; INTRAVENOUS at 08:07

## 2022-05-20 RX ADMIN — FENTANYL CITRATE 25 MCG: 50 INJECTION, SOLUTION INTRAMUSCULAR; INTRAVENOUS at 08:13

## 2022-05-20 RX ADMIN — ONDANSETRON 4 MG: 2 INJECTION INTRAMUSCULAR; INTRAVENOUS at 10:14

## 2022-05-20 RX ADMIN — BUPIVACAINE HYDROCHLORIDE 150 MG: 5 INJECTION, SOLUTION EPIDURAL; INTRACAUDAL; PERINEURAL at 07:27

## 2022-05-20 RX ADMIN — FENTANYL CITRATE 25 MCG: 50 INJECTION, SOLUTION INTRAMUSCULAR; INTRAVENOUS at 10:56

## 2022-05-20 NOTE — ROUTINE PROCESS
Pt bp 88/68, 84/57, 88/59. [employer] and Called Dr. Mohamud Au, bolus 0.9 sodium chloride 500ml. Pt is alert and oriented x 4, no signs of distress and rating pain 3/10 right shoulder.

## 2022-05-20 NOTE — ROUTINE PROCESS
TRANSFER - IN REPORT:    Verbal report received from [facility] RN (name) on Nanine Heart  being received from [employer]) for routine post - op      Report consisted of patients Situation, Background, Assessment and   Recommendations(SBAR). Information from the following report(s) SBAR, Kardex, OR Summary, Intake/Output and Recent Results was reviewed with the receiving nurse. Opportunity for questions and clarification was provided. Assessment completed upon patients arrival to unit and care assumed.

## 2022-05-20 NOTE — OP NOTES
Operative Report    Patient: Nicole Walter MRN: [id number]  SSN: [id number]    YOB: 1945  Age: 68 y.o. Sex: female       Date of Surgery: 5/20/2022     Preoperative Diagnosis: Closed fracture of head of right humerus, initial encounter [S42.291A]     Postoperative Diagnosis: Closed fracture of head of right humerus, initial encounter [S42.291A]     Surgeon(s) and Role:     Elias Heller MD - Primary  Assistant Florencia Tenorio follow-up  Anesthesia: General     Procedure: Procedure(s):  RIGHT SHOULDER REVERSE TOTAL SHOULDER ARTHROPLASTY/ARTHREX/NERVE BLOCK/2 SA'S     Procedure in Detail: Patient was taken to the operating room induced under general trach anesthesia with anesthesia staff. Placed in a beachchair position the right arm prepped with ChloraPrep solution draped free sterile field. A deltopectoral approach was used with subcutaneous tissues dissected sharply to the fascia and then bluntly to the level of the deltopectoral interval which was developed protecting the cephalic vein and retracting it medially. Pectoralis major tendon was released on the superior third and then the biceps tendon was tenodesed to this tendon. The fracture was identified in the greater tuberosity fragment and lesser tuberosity fragments were  and then the humeral head was removed. 5 broach stability. The greater and lesser tuberosities were secured both to the prosthesis into the shaft of the humerus through sutures 6 through bone through prosthesis and then #5 sutures through the shaft of the humerus both for circumferential suture lesser tuberosity and greater tuberosity securing the prosthesis thought to be appropriate size for which then the glenoid was exposed. The baseplate template was placed and the guidepin placed just inferior to the equator of the glenoid and then concentric reaming was achieved after measuring.   The central peg was drilled and the central screw which had measured 20 was tapped. The baseplate with a 20 center screw was then secured into place and further secured with 2 locking screws and 2 nonlocking screws. 33 glenosphere 4 mm lateralized was then impacted and secured with a screw. Attention was then placed proximal humerus trial was tried and thought a 9 mm be appropriate size. The prosthesis was impacted with a 6 mm spacer and 3 mm insert and reduced and secured. The greater tuberosity and lesser tuberosity fragments were tied through the prosthesis into the shaft with sutures passing through the shaft both the lesser tuberosity greater tuberosity fragments and uniting them together [id number] affecting a very stable construct. Irrigation was used Exparel was injected deep and superficial.  Deltopectoral interval was closed with 0 Vicryl subcutaneous tissues with 2-0 Vicryl and the skin with 4-0 Monocryl. Sterile dressings were applied patient tolerated procedure well was taken to recovery room without problems. Estimated Blood Loss: Less than 100 cc    Tourniquet Time: * No tourniquets in log *      Implants:   Implant Name Type Inv. Item Serial No.  Lot No. LRB No. Used Action   SCREW NINOSKA FIX 20MM SHLDR CTRL MOD UNIVERS REVERS - [id number]  SCREW NINOSKA FIX 20MM SHLDR CTRL MOD UNIVERS REVERS  [employer] [id number] Right 1 Implanted   BASEPLATE NINOSKA 40ZJ SHLDR MOD Padmini Gey REVERS - [id number]  BASEPLATE NINOSKA 87IC SHLDR MOD UNIVERS REVERS  [address] [employer] [id number] Right 1 Implanted   SCREW BNE L28MM DIA5. 5MM PERIPH EDUARDO FOR MOD NINOSKA SYS - [id number]  SCREW BNE L28MM DIA5. 5MM PERIPH EDUARDO FOR MOD NINOSKA SYS  [employer] [id number] Right 1 Implanted   SCREW BNE L28MM DIA5. 5MM PERIPH EDUARDO FOR MOD NINOSKA SYS - [id number]  SCREW BNE L28MM DIA5. 5MM PERIPH EDUARDO FOR MOD NINOSKA SYS  [address] [employer] [employer] [id number] Right 1 Implanted   SCREW BNE L20MM [id number] PERIPH NONLOCKING FOR MOD NINOSKA SYS - [id number]  SCREW BNE L20MM [id number] PERIPH NONLOCKING FOR MOD NINOSKA SYS  [employer] [id number] Right 1 Implanted   SCREW BNE L20MM [id number] PERIPH NONLOCKING FOR MOD NINOSKA SYS - [id number]  SCREW BNE L20MM [id number] PERIPH NONLOCKING FOR MOD NINOSKA SYS  [employer] [id number] Right 1 Implanted   SPHERE NINOSKA [id number] +4 BASEPLT 24MM SHLDR LAT TAPR MOD - [id number]  SPHERE NINOSKA [id number] +4 BASEPLT 24MM SHLDR LAT TAPR MOD  [employer] 21.97440 Right 1 Implanted   CUP HUM [id number] +2MM OFFSET R SHLDR SUT UNIVERS REVERS - [id number]  CUP HUM [id number] +2MM OFFSET R SHLDR SUT UNIVERS REVERS  [employer] 21.02278 Right 1 Implanted   STEM HUM SZ 5 SHLDR COAT CAP UNIVERS REVERS - [id number]  STEM HUM SZ 5 SHLDR COAT CAP UNIVERS REVERS  [employer] 21.34284 Right 1 Implanted   SPACER URBANO [id number] +6MM OFFSET HUM TI UNIVERS REVERS - [id number]  SPACER URBANO [id number] +6MM OFFSET HUM TI UNIVERS REVERS  [employer] 21.45486 Right 1 Implanted   INSERT HUM 36MM +3 33MM SHLDR COMB - [id number]  INSERT HUM 36MM +3 33MM SHLDR COMB  [employer] 21.29657 Right 1 Implanted               Specimens:   ID Type Source Tests Collected by Time Destination   1 : RIGHT shoulder lipoma Preservative Shoulder  Domenica Allen MD 5/20/2022 3134 Pathology           Drains: None                Complications: None    Counts: Sponge and needle counts were correct times two.     Signed By:  Kvng Grover MD     May 20, 2022

## 2022-05-20 NOTE — PROGRESS NOTES
Problem: Patient Education: Go to Patient Education Activity  Goal: Patient/Family Education  Outcome: Progressing Towards Goal     Problem: Falls - Risk of  Goal: *Absence of Falls  Description: Document Shannon Mcburney Fall Risk and appropriate interventions in the flowsheet. Outcome: Progressing Towards Goal  Note: Fall Risk Interventions:            Medication Interventions: Patient to call before getting OOB         History of Falls Interventions:  Investigate reason for fall         Problem: Patient Education: Go to Patient Education Activity  Goal: Patient/Family Education  Outcome: Progressing Towards Goal

## 2022-05-20 NOTE — PERIOP NOTES
TRANSFER - OUT REPORT:    Telephone report given to Sabi(name) on Beena Steele  being transferred to [facility](unit) for routine post - op       Report consisted of patients Situation, Background, Assessment and   Recommendations(SBAR). Information from the following report(s) SBAR, OR Summary and MAR was reviewed with the receiving nurse. Lines:   Peripheral IV 05/20/22 Left;Posterior Hand (Active)   Site Assessment Clean, dry, & intact 05/20/22 0642   Phlebitis Assessment 0 05/20/22 0642   Infiltration Assessment 0 05/20/22 0642   Dressing Status Clean, dry, & intact 05/20/22 0642   Dressing Type Transparent;Tape 05/20/22 0642   Hub Color/Line Status Pink; Infusing;Patent 05/20/22 0642   Alcohol Cap Used No 05/20/22 5334        Opportunity for questions and clarification was provided.       Patient transported with:   O2 @ 2 liters  Tech

## 2022-05-20 NOTE — BRIEF OP NOTE
Brief Postoperative Note    Patient: Yoli Ferro  YOB: 1945  MRN: [id number]    Date of Procedure: 5/20/2022     Pre-Op Diagnosis: Closed fracture of head of right humerus, initial encounter [S42.655A]    Post-Op Diagnosis: Same as preoperative diagnosis. Procedure(s):  RIGHT SHOULDER REVERSE TOTAL SHOULDER ARTHROPLASTY/ARTHREX/NERVE BLOCK/2 SA'S    Surgeon(s):  Leonor Baez MD    Surgical Assistant: Surg Asst-1: Sophie Puckett    Anesthesia: General     Estimated Blood Loss (mL): less than 252     Complications: None    Specimens:   ID Type Source Tests Collected by Time Destination   1 : RIGHT shoulder lipoma Preservative Shoulder  Leonor Baez MD 5/20/2022 9632 Pathology        Implants:   Implant Name Type Inv. Item Serial No.  Lot No. LRB No. Used Action   SCREW NINOSKA FIX 20MM SHLDR CTRL MOD UNIVERS REVERS - [id number]  SCREW NINOSKA FIX 20MM SHLDR CTRL MOD UNIVERS REVERS  [employer] [id number] Right 1 Implanted   BASEPLATE NINOSKA 43GH SHLDR MOD Ned Endo REVERS - [id number]  BASEPLATE NINOSKA 95KL SHLDR MOD UNIVERS REVERS  [address] [employer] [employer] [id number] Right 1 Implanted   SCREW BNE L28MM DIA5. 5MM PERIPH EDUARDO FOR MOD NINOSKA SYS - [id number]  SCREW BNE L28MM DIA5. 5MM PERIPH EDUARDO FOR MOD NINOSKA SYS  [employer] [id number] Right 1 Implanted   SCREW BNE L28MM DIA5. 5MM PERIPH EDUARDO FOR MOD NINOSKA SYS - [id number]  SCREW BNE L28MM DIA5. 5MM PERIPH EDUARDO FOR MOD NINOSKA SYS  [employer] [id number] Right 1 Implanted   SCREW BNE L20MM [id number] PERIPH NONLOCKING FOR MOD NINOSKA SYS - [id number]  SCREW BNE L20MM [id number] PERIPH NONLOCKING FOR MOD NINOSKA SYS  [employer] [id number] Right 1 Implanted   SCREW BNE L20MM [id number] PERIPH NONLOCKING FOR MOD NINOSKA SYS - [id number]  SCREW BNE L20MM [id number] PERIPH NONLOCKING FOR MOD NINOSKA SYS  [employer] [id number] Right 1 Implanted   SPHERE NINOSKA [id number] +4 BASEPLT 24MM SHLDR LAT TAPR MOD - [id number]  SPHERE NINOSKA [id number] +4 BASEPLT 24MM SHLDR LAT TAPR MOD  [employer] 21.10199 Right 1 Implanted   CUP HUM [id number] +2MM OFFSET R SHLDR SUT UNIVERS REVERS - [id number]  CUP HUM [id number] +2MM OFFSET R SHLDR SUT UNIVERS REVERS  [employer] 21.77269 Right 1 Implanted   STEM HUM SZ 5 SHLDR COAT CAP UNIVERS REVERS - [id number]  STEM HUM SZ 5 SHLDR COAT CAP UNIVERS REVERS  [employer] 21.91239 Right 1 Implanted   SPACER URBANO [id number] +6MM OFFSET HUM TI UNIVERS REVERS - [id number]  SPACER URBANO [id number] +6MM OFFSET HUM TI UNIVERS REVERS  [employer] 21.19051 Right 1 Implanted   INSERT HUM 36MM +3 33MM SHLDR COMB - [id number]  INSERT HUM 36MM +3 33MM SHLDR COMB  [employer] 21.13587 Right 1 Implanted       Drains: * No LDAs found *    Findings: As above    Electronically Signed by Mery Miles MD on 5/20/2022 at 10:26 AM

## 2022-05-20 NOTE — ROUTINE PROCESS
Bedside verbal report given to Radha Carson (oncoming nurse) from [facility] GERHARD MAURER RN (outgoing nurse)    Report consisted of patients Situation, Background, Assessment and   Recommendations(SBAR). Information from the following report(s): Kardex, MAR and Recent Results was reviewed with the oncoming nurse. Opportunity for questions and clarification was provided.

## 2022-05-20 NOTE — PROGRESS NOTES
Problem: Mobility Impaired (Adult and Pediatric)  Goal: *Acute Goals and Plan of Care (Insert Text)  Description: Physical Therapy Goals  Initiated 5/20/2022 and to be accomplished within 7 day(s)  1. Patient will move from supine to sit and sit to supine , scoot up and down, and roll side to side in bed with modified independence. 2.  Patient will transfer from bed to chair and chair to bed with modified independence using the least restrictive device. 3.  Patient will perform sit to stand with modified independence. 4.  Patient will ambulate with modified independence for 150 feet with the least restrictive device. 5.  Patient will maintain R RTSA precautions as prescribed. PLOF: pt lives alone in a senior apt with elevator access, ind PTA, no DME, son and brother providing support following surgery      Outcome: Progressing Towards Goal     PHYSICAL THERAPY EVALUATION    Patient: Hanna Neely (33 y.o. female)  Date: 5/20/2022  Primary Diagnosis: Closed fracture of head of right humerus, initial encounter [S42.291A]  Closed fracture of right proximal humerus [S42.201A]  Procedure(s) (LRB):  RIGHT SHOULDER REVERSE TOTAL SHOULDER ARTHROPLASTY/ARTHREX/NERVE BLOCK/2 SA'S (Right) Day of Surgery   Precautions:   Fall  No PROM, no ER, AROM elbow/wrist  PLOF: see above     ASSESSMENT :  Patient is 69 yo F admitted to hospital for R RTSA and presents today POD 0 and agreeable to therapy and was awake in bed with sling donned upon arrival. Patient was educated on role of therpay and shoulder precautions including no active use of shoulder and sling wear. Patient educated on components of sling and on donning/doffing and verbalized understanding understanding. Patient educated on importance of continued elbow flex/ext, wrist flex/ext and ulnar and radial deviation, as well as active finger and hand movement to prevent edema and weakness.  Patient then transferred to sitting at EOB and sling adjusted for appropriate fit with education and objective LE assessment performed. Patient then educated on sit <> stand transfer and gait training and stood with CGA. Patient ambulated CGA 20 ft to/from bathroom with no AD for voiding, on way back to bed pt with increased unsteadiness- reporting feeling \"woozy\" and pt safely returned to sitting, SBA for back to supine. BP taken and found to be 106/76 with . At conclusion of session patient was left resting with call bell by their side and denied need for further assist at this time. Patient encouraged to continue mobilization with staff assist and educated not to get up without assist; oriented to call bell use. Pt not yet cleared for discharge home, nursing notified of progress and bed alarm on, ice pack donned. Patient will benefit from skilled intervention to address the above impairments. Patient's rehabilitation potential is considered to be Good  Factors which may influence rehabilitation potential include:   [x]         None noted  []         Mental ability/status  []         Medical condition  []         Home/family situation and support systems  []         Safety awareness  []         Pain tolerance/management  []         Other:      PLAN :  Recommendations and Planned Interventions:   [x]           Bed Mobility Training             [x]    Neuromuscular Re-Education  [x]           Transfer Training                   []    Orthotic/Prosthetic Training  [x]           Gait Training                          []    Modalities  [x]           Therapeutic Exercises           []    Edema Management/Control  [x]           Therapeutic Activities            [x]    Family Training/Education  [x]           Patient Education  []           Other (comment):    Frequency/Duration: Patient will be followed by physical therapy 1-2 times per day/3-5 days per week to address goals.   Discharge Recommendations: Outpatient per MD when cleared , family assist at home as needed Further Equipment Recommendations for Discharge: N/A     SUBJECTIVE:   Patient stated I am getting over my nerves now that its over.     OBJECTIVE DATA SUMMARY:     Past Medical History:   Diagnosis Date    Arthritis     Thyroid disease     Benign nodule removed     Past Surgical History:   Procedure Laterality Date    HX  SECTION      HX LIPOMA RESECTION Right     HX PARTIAL THYROIDECTOMY      HX TUBAL LIGATION      NEUROLOGICAL PROCEDURE UNLISTED      Ablation of Cervical nerve     Barriers to Learning/Limitations: yes;  physical  Compensate with: Visual Cues, Verbal Cues, and Tactile Cues  Home Situation:  Home Situation  Home Environment: Apartment  # Steps to Enter: 0 (elevator )  Living Alone: Yes  Support Systems: Child(nhung),Other Family Member(s)  Patient Expects to be Discharged to[de-identified] Home  Current DME Used/Available at Home: None  Critical Behavior:  Neurologic State: Alert  Orientation Level: Oriented X4  Cognition: Follows commands  Safety/Judgement: Fall prevention  Psychosocial  Patient Behaviors: Calm; Cooperative                 Strength:    Strength: Within functional limits                    Tone & Sensation:   Tone: Normal              Sensation: Intact               Range Of Motion:  AROM: Within functional limits       Functional Mobility:  Bed Mobility:     Supine to Sit: Stand-by assistance  Sit to Supine: Stand-by assistance     Transfers:  Sit to Stand: Stand-by assistance  Stand to Sit: Contact guard assistance       Balance:   Sitting: Intact  Standing: Impaired; Without support  Standing - Static: Good  Standing - Dynamic : Fair    Ambulation/Gait Training:  Distance (ft): 20 Feet (ft) (x2)  Assistive Device: Other (comment) (none )  Ambulation - Level of Assistance: Contact guard assistance        Gait Abnormalities: Decreased step clearance        Base of Support: Narrowed; Center of gravity altered     Speed/Frannie: Pace decreased (<100 feet/min)  Step Length: Right shortened;Left shortened    Pain:  Pain level pre-treatment: 0/10   Pain level post-treatment: 0/10   Pain Intervention(s) : Medication (see MAR); Rest, Ice, Repositioning  Response to intervention: Nurse notified    Activity Tolerance:   Good    Please refer to the flowsheet for vital signs taken during this treatment. After treatment:   []         Patient left in no apparent distress sitting up in chair  [x]         Patient left in no apparent distress in bed  [x]         Call bell left within reach  [x]         Nursing notified  []         Caregiver present  [x]         Bed alarm activated  []         SCDs applied    COMMUNICATION/EDUCATION:   [x]         Role of Physical Therapy in the acute care setting. [x]         Fall prevention education was provided and the patient/caregiver indicated understanding. [x]         Patient/family have participated as able in goal setting and plan of care. [x]         Patient/family agree to work toward stated goals and plan of care. []         Patient understands intent and goals of therapy, but is neutral about his/her participation. []         Patient is unable to participate in goal setting/plan of care: ongoing with therapy staff.  []         Other:     Thank you for this referral.  Jamaica Night   Time Calculation: 20 mins      Eval Complexity: History: MEDIUM  Complexity : 1-2 comorbidities / personal factors will impact the outcome/ POC Exam:MEDIUM Complexity : 3 Standardized tests and measures addressing body structure, function, activity limitation and / or participation in recreation  Presentation: MEDIUM Complexity : Evolving with changing characteristics  Clinical Decision Making:Medium Complexity    Overall Complexity:MEDIUM

## 2022-05-20 NOTE — ANESTHESIA PROCEDURE NOTES
Peripheral Block    Start time: 5/20/2022 7:26 AM  End time: 5/20/2022 7:32 AM  Performed by: Wayne Gutierrez MD  Authorized by: Wayne Gutierrez MD       Pre-procedure: Indications: at surgeon's request and post-op pain management    Preanesthetic Checklist: patient identified, risks and benefits discussed, site marked, timeout performed, anesthesia consent given and patient being monitored    Timeout Time: 07:26 EDT          Block Type:   Block Type:   Interscalene  Laterality:  Right  Monitoring:  Standard ASA monitoring, responsive to questions, oxygen, continuous pulse ox, frequent vital sign checks and heart rate  Injection Technique:  Single shot  Procedures: ultrasound guided and nerve stimulator    Patient Position: supine  Prep: chlorhexidine    Location:  Interscalene  Needle Type:  Stimuplex  Needle Gauge:  22 G  Needle Localization:  Nerve stimulator and ultrasound guidance  Motor Response comment:   Motor Response: minimal motor response >0.4 mA   Medication Injected:  Ropivacaine (PF) (NAROPIN)(0.5%) 5 mg/mL injection, 30 mL  Med Admin Time: 5/20/2022 7:32 AM    Assessment:  Number of attempts:  1  Injection Assessment:  Incremental injection every 5 mL, negative aspiration for CSF, no paresthesia, ultrasound image on chart, no intravascular symptoms, negative aspiration for blood and local visualized surrounding nerve on ultrasound  Patient tolerance:  Patient tolerated the procedure well with no immediate complications

## 2022-05-20 NOTE — PROGRESS NOTES
conducted a pre-surgery visit with Laurie Robertson, who is a 68 y.o.,female. The  provided the following Interventions:  Initiated a relationship of care and support. Offered prayer and assurance of continued prayers on patient's behalf. There is no advance directive present. Plan:  Chaplains will continue to follow and will provide pastoral care on an as needed/requested basis.  recommends bedside caregivers page  on duty if patient shows signs of acute spiritual or emotional distress.     Reiseñor 3   Board Certified [address] [place] [address]   [phone]

## 2022-05-20 NOTE — PROGRESS NOTES
Date of Surgery Update:  Adriane Son was seen and examined. History and physical has been reviewed. The patient has been examined. There have been no significant clinical changes since the completion of the originally dated History and Physical.    Signed By: Clarence Tipton MD     May 20, 2022 7:26 AM         The above patient was independently seen and examined by myself. The case was then discussed and a proper diagnosis/plan was made. I agree with the above assessment.

## 2022-05-20 NOTE — ANESTHESIA POSTPROCEDURE EVALUATION
Procedure(s):  RIGHT SHOULDER REVERSE TOTAL SHOULDER ARTHROPLASTY/ARTHREX/NERVE BLOCK/2 SA'S. general, regional    Anesthesia Post Evaluation      Multimodal analgesia: multimodal analgesia used between 6 hours prior to anesthesia start to PACU discharge  Patient location during evaluation: PACU  Patient participation: complete - patient participated  Level of consciousness: awake and alert  Pain management: adequate  Airway patency: patent  Anesthetic complications: no  Cardiovascular status: acceptable and hemodynamically stable  Respiratory status: acceptable  Hydration status: acceptable  Post anesthesia nausea and vomiting:  controlled      INITIAL Post-op Vital signs:   Vitals Value Taken Time   /63 05/20/22 1212   Temp     Pulse 110 05/20/22 1218   Resp 18 05/20/22 1218   SpO2 99 % 05/20/22 1218   Vitals shown include unvalidated device data.

## 2022-05-21 LAB
ALBUMIN SERPL-MCNC: 2.9 G/DL (ref 3.4–5)
ALBUMIN/GLOB SERPL: 1.5 {RATIO} (ref 0.8–1.7)
ALP SERPL-CCNC: 41 U/L (ref 45–117)
ALT SERPL-CCNC: 11 U/L (ref 13–56)
ANION GAP SERPL CALC-SCNC: 7 MMOL/L (ref 3–18)
AST SERPL-CCNC: 11 U/L (ref 10–38)
BILIRUB SERPL-MCNC: 0.3 MG/DL (ref 0.2–1)
BUN SERPL-MCNC: 17 MG/DL (ref 7–18)
BUN/CREAT SERPL: 24 (ref 12–20)
CALCIUM SERPL-MCNC: 8.1 MG/DL (ref 8.5–10.1)
CHLORIDE SERPL-SCNC: 105 MMOL/L (ref 100–111)
CO2 SERPL-SCNC: 24 MMOL/L (ref 21–32)
CREAT SERPL-MCNC: 0.72 MG/DL (ref 0.6–1.3)
ERYTHROCYTE [DISTWIDTH] IN BLOOD BY AUTOMATED COUNT: 12.5 % (ref 11.6–14.5)
ERYTHROCYTE [DISTWIDTH] IN BLOOD BY AUTOMATED COUNT: 12.7 % (ref 11.6–14.5)
FERRITIN SERPL-MCNC: 226 NG/ML (ref 8–388)
GLOBULIN SER CALC-MCNC: 1.9 G/DL (ref 2–4)
GLUCOSE SERPL-MCNC: 116 MG/DL (ref 74–99)
HAPTOGLOB SERPL-MCNC: 138 MG/DL (ref 30–200)
HCT VFR BLD AUTO: 22.2 % (ref 35–45)
HCT VFR BLD AUTO: 23.8 % (ref 35–45)
HGB BLD-MCNC: 7.4 G/DL (ref 12–16)
HGB BLD-MCNC: 7.9 G/DL (ref 12–16)
IRON SATN MFR SERPL: 12 % (ref 20–50)
IRON SERPL-MCNC: 20 UG/DL (ref 50–175)
LDH SERPL L TO P-CCNC: 106 U/L (ref 81–234)
MCH RBC QN AUTO: 31.7 PG (ref 24–34)
MCH RBC QN AUTO: 31.9 PG (ref 24–34)
MCHC RBC AUTO-ENTMCNC: 33.2 G/DL (ref 31–37)
MCHC RBC AUTO-ENTMCNC: 33.3 G/DL (ref 31–37)
MCV RBC AUTO: 95.6 FL (ref 78–100)
MCV RBC AUTO: 95.7 FL (ref 78–100)
NRBC # BLD: 0 K/UL (ref 0–0.01)
NRBC # BLD: 0 K/UL (ref 0–0.01)
NRBC BLD-RTO: 0 PER 100 WBC
NRBC BLD-RTO: 0 PER 100 WBC
PLATELET # BLD AUTO: 172 K/UL (ref 135–420)
PLATELET # BLD AUTO: 182 K/UL (ref 135–420)
PMV BLD AUTO: 9.5 FL (ref 9.2–11.8)
PMV BLD AUTO: 9.8 FL (ref 9.2–11.8)
POTASSIUM SERPL-SCNC: 3.6 MMOL/L (ref 3.5–5.5)
PROT SERPL-MCNC: 4.8 G/DL (ref 6.4–8.2)
RBC # BLD AUTO: 2.32 M/UL (ref 4.2–5.3)
RBC # BLD AUTO: 2.49 M/UL (ref 4.2–5.3)
RETICS/RBC NFR AUTO: 2.1 % (ref 0.5–2.5)
SODIUM SERPL-SCNC: 136 MMOL/L (ref 136–145)
TIBC SERPL-MCNC: 170 UG/DL (ref 250–450)
WBC # BLD AUTO: 10.6 K/UL (ref 4.6–13.2)
WBC # BLD AUTO: 9.6 K/UL (ref 4.6–13.2)

## 2022-05-21 PROCEDURE — 74011250637 HC RX REV CODE- 250/637: Performed by: STUDENT IN AN ORGANIZED HEALTH CARE EDUCATION/TRAINING PROGRAM

## 2022-05-21 PROCEDURE — 65270000029 HC RM PRIVATE

## 2022-05-21 PROCEDURE — 80053 COMPREHEN METABOLIC PANEL: CPT

## 2022-05-21 PROCEDURE — 97165 OT EVAL LOW COMPLEX 30 MIN: CPT

## 2022-05-21 PROCEDURE — 74011250636 HC RX REV CODE- 250/636: Performed by: STUDENT IN AN ORGANIZED HEALTH CARE EDUCATION/TRAINING PROGRAM

## 2022-05-21 PROCEDURE — 74011000250 HC RX REV CODE- 250: Performed by: ORTHOPAEDIC SURGERY

## 2022-05-21 PROCEDURE — 74011250636 HC RX REV CODE- 250/636: Performed by: ORTHOPAEDIC SURGERY

## 2022-05-21 PROCEDURE — 77030027138 HC INCENT SPIROMETER -A

## 2022-05-21 PROCEDURE — 97535 SELF CARE MNGMENT TRAINING: CPT

## 2022-05-21 PROCEDURE — 83010 ASSAY OF HAPTOGLOBIN QUANT: CPT

## 2022-05-21 PROCEDURE — 74011250637 HC RX REV CODE- 250/637: Performed by: PHYSICIAN ASSISTANT

## 2022-05-21 PROCEDURE — 82728 ASSAY OF FERRITIN: CPT

## 2022-05-21 PROCEDURE — 2709999900 HC NON-CHARGEABLE SUPPLY

## 2022-05-21 PROCEDURE — 85027 COMPLETE CBC AUTOMATED: CPT

## 2022-05-21 PROCEDURE — 85045 AUTOMATED RETICULOCYTE COUNT: CPT

## 2022-05-21 PROCEDURE — 83615 LACTATE (LD) (LDH) ENZYME: CPT

## 2022-05-21 PROCEDURE — 74011250636 HC RX REV CODE- 250/636: Performed by: SPECIALIST

## 2022-05-21 PROCEDURE — 36415 COLL VENOUS BLD VENIPUNCTURE: CPT

## 2022-05-21 PROCEDURE — 83540 ASSAY OF IRON: CPT

## 2022-05-21 RX ORDER — MIDODRINE HYDROCHLORIDE 2.5 MG/1
2.5 TABLET ORAL 2 TIMES DAILY WITH MEALS
Status: DISCONTINUED | OUTPATIENT
Start: 2022-05-21 | End: 2022-05-24 | Stop reason: HOSPADM

## 2022-05-21 RX ADMIN — SENNOSIDES AND DOCUSATE SODIUM 1 TABLET: 50; 8.6 TABLET ORAL at 08:26

## 2022-05-21 RX ADMIN — GABAPENTIN 300 MG: 300 CAPSULE ORAL at 21:49

## 2022-05-21 RX ADMIN — WATER 2 G: 1 INJECTION INTRAMUSCULAR; INTRAVENOUS; SUBCUTANEOUS at 01:05

## 2022-05-21 RX ADMIN — SODIUM CHLORIDE, SODIUM LACTATE, POTASSIUM CHLORIDE, AND CALCIUM CHLORIDE 1000 ML: 600; 310; 30; 20 INJECTION, SOLUTION INTRAVENOUS at 05:04

## 2022-05-21 RX ADMIN — MIDODRINE HYDROCHLORIDE 2.5 MG: 2.5 TABLET ORAL at 16:18

## 2022-05-21 RX ADMIN — SODIUM CHLORIDE, SODIUM LACTATE, POTASSIUM CHLORIDE, AND CALCIUM CHLORIDE 500 ML: 600; 310; 30; 20 INJECTION, SOLUTION INTRAVENOUS at 10:35

## 2022-05-21 RX ADMIN — PANTOPRAZOLE SODIUM 40 MG: 40 TABLET, DELAYED RELEASE ORAL at 08:26

## 2022-05-21 RX ADMIN — HYDROCODONE BITARTRATE AND ACETAMINOPHEN 1 TABLET: 7.5; 325 TABLET ORAL at 10:57

## 2022-05-21 RX ADMIN — HYDROCODONE BITARTRATE AND ACETAMINOPHEN 1 TABLET: 7.5; 325 TABLET ORAL at 06:24

## 2022-05-21 RX ADMIN — HYDROCODONE BITARTRATE AND ACETAMINOPHEN 1 TABLET: 7.5; 325 TABLET ORAL at 16:29

## 2022-05-21 RX ADMIN — PANTOPRAZOLE SODIUM 40 MG: 40 TABLET, DELAYED RELEASE ORAL at 16:18

## 2022-05-21 RX ADMIN — WATER 2 G: 1 INJECTION INTRAMUSCULAR; INTRAVENOUS; SUBCUTANEOUS at 08:25

## 2022-05-21 RX ADMIN — HYDROCODONE BITARTRATE AND ACETAMINOPHEN 1 TABLET: 7.5; 325 TABLET ORAL at 20:30

## 2022-05-21 RX ADMIN — LEVOTHYROXINE SODIUM 50 MCG: 0.05 TABLET ORAL at 05:04

## 2022-05-21 RX ADMIN — POLYETHYLENE GLYCOL 3350 17 G: 17 POWDER, FOR SOLUTION ORAL at 08:26

## 2022-05-21 NOTE — PROGRESS NOTES
PT treatment attempted at, pt with low BP. No OOB per Mak Welch PA-C. Will continue to follow.      Thank you for this referral.   Elieser Leyva PT DPT

## 2022-05-21 NOTE — CONSULTS
Consult Note  [facility]      Patient: Navdeep Arguelles MRN: [id number]  SSN: [id number]    YOB: 1945  Age: 68 y.o. Sex: female      Subjective: Navdeep Arguelles is a 68 y.o. female w/ a PMH of hypothyroid, GERD and R humeral fx who is admitted for reverse total arthroplasty was consulted for hypotension. Admitting team attempted fluid resuscitation with 500cc of NS and 1L of LR which raised bp from 80s/50s to 90s/50s. MAPs have remained above 65. Tachycardia resolved with the boluses. Of note, pt's Hgb dropped from 13.5 to 7.9 in a 10 day span, which points to volume depletion. She also has been taking norco 7.5mg, however I doubt pt would have acute hypotension with low dose opioid. Patient denies any melena, chest pain, or SOB. Tolerating PO diet well. Pain well controlled. Only complains of fatigue and \"groginess\", but has not had any syncope. She denies hx of hypotension in past except for one incident when she was given morphine. Past Medical History:   Diagnosis Date    Arthritis     Thyroid disease     Benign nodule removed     Past Surgical History:   Procedure Laterality Date    HX  SECTION      HX LIPOMA RESECTION Right     HX PARTIAL THYROIDECTOMY      HX TUBAL LIGATION      NEUROLOGICAL PROCEDURE UNLISTED      Ablation of Cervical nerve      History reviewed. No pertinent family history.   Social History     Tobacco Use    Smoking status: Never Smoker    Smokeless tobacco: Never Used   Substance Use Topics    Alcohol use: Not Currently     Comment: DAILY / No since 3/17/22      Current Facility-Administered Medications   Medication Dose Route Frequency Provider Last Rate Last Admin    fluticasone propionate (FLONASE) 50 mcg/actuation nasal spray 1 Spray  1 Spray Both Nostrils DAILY [place], [place], [address]        pantoprazole (PROTONIX) tablet 40 mg  40 mg Oral ACB&D MAICO Freeman   40 mg at 22 [id number]    HYDROcodone-acetaminophen (NORCO) 7.5-325 mg per tablet 1-2 Tablet  1-2 Tablet Oral Q4H PRN MAICO Prado   1 Tablet at 05/21/22 1057    levothyroxine (SYNTHROID) tablet 50 mcg  50 mcg Oral ACB Sherice Prado   50 mcg at 05/21/22 2578    gabapentin (NEURONTIN) capsule 300 mg  300 mg Oral QHS Sherice Prado   300 mg at 05/20/22 2107    naloxone [facility]) injection 1 mg  1 mg IntraVENous ONCE PRN MAICO Prado        polyethylene glycol [facility]) packet 17 g  17 g Oral DAILY Sherice Prado   17 g at 05/21/22 0885    senna-docusate (PERICOLACE) 8.6-50 mg per tablet 1 Tablet  1 Tablet Oral DAILY Sherice Prado   1 Tablet at 05/21/22 3110    HYDROmorphone (DILAUDID) injection 1-2 mg  1-2 mg IntraVENous Q4H PRN Roxana Cain PA        ondansetron [facility]) injection 8 mg  8 mg IntraVENous Q6H PRN Jessica Mccoy MD        prochlorperazine (COMPAZINE) injection 5 mg  5 mg IntraVENous Q4H PRN Jessica Mccoy MD            Allergies   Allergen Reactions    Codeine Other (comments)     Headache    Morphine Other (comments)     BP DROPPED    Sulfa (Sulfonamide Antibiotics) Other (comments)     MIGRAINE       Review of Systems:  No CP, SOB, bleeding, fever, chills or syncope    Objective:     Vitals:    05/21/22 0423 05/21/22 0623 05/21/22 0823 05/21/22 1052   BP: (!) 86/47 (!) 91/58 (!) 91/57 (!) 95/59   Pulse: 77  84 72   Resp: 19  16 16   Temp: 97.5 °F (36.4 °C)  98.5 °F (36.9 °C) 97.3 °F (36.3 °C)   SpO2: 97%  97% 96%   Weight:       Height:            Physical Exam:  GENERAL: alert, cooperative, no distress, appears stated age, pale  LUNG: clear to auscultation bilaterally  HEART: regular rate and rhythm, S1, S2 normal, no murmur, click, rub or gallop  ABDOMEN: soft, non-tender.  Bowel sounds normal. No masses,  no organomegaly  SKIN: pale    Assessment & Plan:     Hospital Problems  Date Reviewed: 5/19/2022          Codes Class Noted POA    Closed fracture of right proximal humerus ICD-10-CM: S42.201A  ICD-9-CM: 812.00  5/20/2022 Unknown              Hypotension  DDX: hypolemic vs sepsis vs opioid overdose, neurogenic and cardiogenic less likely  -afrebrile  -s/p 1.5L fluids  - EGD 2013 shows hiatal hernia, stricture and esophagitis  -hx of hypotension with morphine many years ago  -on nexium outpatient, now on protonix 40 inpatient  -hgb 7.9 5/21, down from 13.5 5/11   Plan  -repeat CBC, add CMP, LDH, retic count and iron profile  -continue holding celebrix and lyrica  -continue monitoring bp  -if hypotension persists after last 500cc bolus of LR will add midodrine    Elijah Mcgraw DO, PGY-2   [facility] Medicine   May 21, 2022, 10:01 AM

## 2022-05-21 NOTE — PROGRESS NOTES
Problem: Self Care Deficits Care Plan (Adult)  Goal: *Acute Goals and Plan of Care (Insert Text)  Description: Occupational Therapy Goals  Initiated 5/21/2022 within 7 day(s). 1.  Patient will perform RUE AROM for elbow/wrist/hand with modified independence for 8 min in preparation for functional use for ADLs. 2.  Patient will participate in RUE shoulder PROM (no ER) for 8 min with <2 rest breaks in preparation for functional use for ADLs . 3. Patient will perform self-feeding and grooming with supervision/set-up. 4.  Patient will perform toilet transfers with supervision/set-up. 5.  Patient will perform all aspects of toileting with supervision/set-up. 6.  Patient will perform upper body dressing with supervision. 7.  Patient will perform lower body dressing with supervision. 8.  Patient will utilize energy conservation techniques during functional activities with verbal cues. Prior Level of Function: Pt reports being Mod Ind for ADLs and functional mobility. Her son and brother will be staying with her to assist prn. Outcome: Progressing Towards Goal   OCCUPATIONAL THERAPY EVALUATION    Patient: Hortensia Markham (73 y.o. female)  Date: 5/21/2022  Primary Diagnosis: Closed fracture of head of right humerus, initial encounter [S42.291A]  Closed fracture of right proximal humerus [S42.201A]  Procedure(s) (LRB):  RIGHT SHOULDER REVERSE TOTAL SHOULDER ARTHROPLASTY/ARTHREX/NERVE BLOCK/2 SA'S (Right) 1 Day Post-Op   Precautions:   Fall (R shoulder PROM no ER, AROM elbow, wrist, hand)    ASSESSMENT :  Pt cleared to participate in OT evaluation by RN. Upon entering room, pt received in bed, alert, and agreeable to OT eval/treatment. Pt seen at bed level only (per ortho PA) due to persistent hypotension.  Based on the objective data described below, the patient presents with increased pain in R shoulder, shoulder precautions (no AROM R shd, PROM no ER, AROM R elbow, wrist, hand, ok to remove sling in bed), decreased endurance and activity tolerance, affecting her participation and independence with ADLs. Pt educated on her precautions and on adaptive strategies for UB and LB ADLs, and on doffing/donning sling. Pt verbalized understanding. Pt demonstrates AROM for R elbow extension, wrist flexion/extension, radial/ulnar deviation, and forearm pronation/supination, with difficulty performing R elbow flexion. PROM R elbow WFL. Pt only tolerated ~30 degrees of R shoulder PROM flexion x3 reps, following which requesting to rest. Pt's RUE positioned on the pillow for edema management, ice pack applied. Patient will benefit from skilled intervention to address the above impairments. Patient's rehabilitation potential is considered to be Fair  Factors which may influence rehabilitation potential include:   []             None noted  []             Mental ability/status  [x]             Medical condition  [x]             Home/family situation and support systems  []             Safety awareness  []             Pain tolerance/management  []             Other:      PLAN :  Recommendations and Planned Interventions:   [x]               Self Care Training                  [x]      Therapeutic Activities  [x]               Functional Mobility Training   [x]      Cognitive Retraining  [x]               Therapeutic Exercises           [x]      Endurance Activities  [x]               Balance Training                    []      Neuromuscular Re-Education  []               Visual/Perceptual Training     [x]      Home Safety Training  [x]               Patient Education                   [x]      Family Training/Education  []               Other (comment):    Frequency/Duration: Patient will be followed by occupational therapy 1-2 times/day, 3-5 days/week to address goals.   Discharge Recommendations: Outpatient  Further Equipment Recommendations for Discharge: shower chair     SUBJECTIVE:   Patient stated Reji Epps will be okay once my blood pressure is better. I practiced to do everything with my left hand.     OBJECTIVE DATA SUMMARY:     Past Medical History:   Diagnosis Date    Arthritis     Thyroid disease     Benign nodule removed     Past Surgical History:   Procedure Laterality Date    HX  SECTION      HX LIPOMA RESECTION Right     HX PARTIAL THYROIDECTOMY      HX TUBAL LIGATION      NEUROLOGICAL PROCEDURE UNLISTED      Ablation of Cervical nerve     Barriers to Learning/Limitations: None  Compensate with: visual, verbal, tactile, kinesthetic cues/model    Home Situation:   Home Situation  Home Environment: Apartment  # Steps to Enter: 0  One/Two Story Residence: Other (Comment)  Living Alone: Yes  Support Systems: Child(nhung)  Patient Expects to be Discharged to[de-identified] Home  Current DME Used/Available at Home: None  Tub or Shower Type: Shower  [x]  Right hand dominant   []  Left hand dominant    Cognitive/Behavioral Status:  Neurologic State: Alert  Orientation Level: Oriented X4  Cognition: Follows commands  Safety/Judgement: Awareness of environment; Fall prevention    Skin: incision R shoulder  Edema: min proximal RUE    Vision/Perceptual:       Appears to be WFL  Coordination: BUE  Coordination: Generally decreased, functional  Fine Motor Skills-Upper: Left Intact; Right Intact    Gross Motor Skills-Upper: Left Intact (R not tested)  Strength: BUE  Strength: Generally decreased, functional (LUE and R hand)   Tone & Sensation: BUE  Tone: Normal  Sensation: Intact   Range of Motion: BUE  AROM: Within functional limits (LUE)  PROM:  (RUE shd only tolerates flexion to ~30 degrees)   Functional Mobility and Transfers for ADLs:  Bed Mobility:     Supine to Sit: Stand-by assistance (pull up to long sit in bed)   ADL Assessment:   Feeding: Contact guard assistance  Oral Facial Hygiene/Grooming: Contact guard assistance  Bathing: Moderate assistance  Upper Body Dressing: Moderate assistance  Lower Body Dressing:  Moderate assistance  Toileting: Minimum assistance   ADL Intervention:   Cognitive Retraining  Safety/Judgement: Awareness of environment; Fall prevention    Pain:  Pain level pre-treatment: not rated  Pain level post-treatment: not rated    Activity Tolerance:   Fair  Please refer to the flowsheet for vital signs taken during this treatment. After treatment:   [] Patient left in no apparent distress sitting up in chair  [x] Patient left in no apparent distress in bed  [x] Call bell left within reach  [x] Nursing notified  [] Caregiver present  [] Bed alarm activated    COMMUNICATION/EDUCATION:   [x] Role of Occupational Therapy in the acute care setting  [x] Home safety education was provided and the patient/caregiver indicated understanding. [x] Patient/family have participated as able in goal setting and plan of care. [x] Patient/family agree to work toward stated goals and plan of care. [] Patient understands intent and goals of therapy, but is neutral about his/her participation. [] Patient is unable to participate in goal setting and plan of care. Thank you for this referral.  Memo Garcia OTR/L  Time Calculation: 20 mins    Eval Complexity: History: LOW Complexity : Brief history review ; Examination: LOW Complexity : 1-3 performance deficits relating to physical, cognitive , or psychosocial skils that result in activity limitations and / or participation restrictions ;    Decision Making:LOW Complexity : No comorbidities that affect functional and no verbal or physical assistance needed to complete eval tasks

## 2022-05-21 NOTE — ROUTINE PROCESS
Bedside shift change report given to AMBER Nicholson (oncoming nurse) by Sarah Mcgowan RN (offgoing nurse). Report included the following information SBAR, Kardex and MAR.

## 2022-05-21 NOTE — PROGRESS NOTES
Per Ortho:    Patient sitting up at bedside eating breakfast alert and oriented x3. Patient 24 hours status post reverse total right shoulder replacement with chemical closure superficially. Patient complaining of just not feeling well. No chest pain or shortness of breath however. Patient had several occurrences of hypotension over the past 12 hours and nursing reported bolus of normal saline. Vital signs still low this morning. Vital signs as below:    05/21/22 0823 98.5 °F (36.9 °C) 84 91/57 Abnormal  68   16 97 %       05/21/22 0623   91/58 Abnormal  69           05/21/22 0423 97.5 °F (36.4 °C) 77 86/47 Abnormal  60 Abnormal   At rest 19 97 %       05/21/22 0000 98.3 °F (36.8 °C) 98 93/62 72  At rest 19 96 %         Bulky surgical dressings removed right proximal anterior shoulder reveal a chemical closure site of the skin with wound borders well approximated. Surrounding tissue soft with no erythema no indurations or fluctuance. Right upper extremity in a sling with elbow forearm at 90 degrees. Distal sensation intact fully right upper extremity. Patient has resisted extension of her right wrist confirming radial nerve patency. Plan: Patient is under the care of E S Dr. Nieves Warner for general medical needs. E S resident service requested to see patient today for hypotension. Holding Celebrex and Lyrica until seen by medicine. PT OT to hold on patient out of bed until cleared by medicine.

## 2022-05-22 PROBLEM — I95.9 HYPOTENSION: Status: ACTIVE | Noted: 2022-05-22

## 2022-05-22 LAB
ALBUMIN SERPL-MCNC: 2.7 G/DL (ref 3.4–5)
ALBUMIN/GLOB SERPL: 1.1 {RATIO} (ref 0.8–1.7)
ALP SERPL-CCNC: 51 U/L (ref 45–117)
ALT SERPL-CCNC: 9 U/L (ref 13–56)
ANION GAP SERPL CALC-SCNC: 5 MMOL/L (ref 3–18)
AST SERPL-CCNC: 14 U/L (ref 10–38)
BASOPHILS # BLD: 0 K/UL (ref 0–0.1)
BASOPHILS NFR BLD: 0 % (ref 0–2)
BILIRUB SERPL-MCNC: 1.2 MG/DL (ref 0.2–1)
BUN SERPL-MCNC: 14 MG/DL (ref 7–18)
BUN/CREAT SERPL: 24 (ref 12–20)
CALCIUM SERPL-MCNC: 8.4 MG/DL (ref 8.5–10.1)
CHLORIDE SERPL-SCNC: 104 MMOL/L (ref 100–111)
CO2 SERPL-SCNC: 27 MMOL/L (ref 21–32)
CREAT SERPL-MCNC: 0.59 MG/DL (ref 0.6–1.3)
DIFFERENTIAL METHOD BLD: ABNORMAL
EOSINOPHIL # BLD: 0.2 K/UL (ref 0–0.4)
EOSINOPHIL NFR BLD: 3 % (ref 0–5)
ERYTHROCYTE [DISTWIDTH] IN BLOOD BY AUTOMATED COUNT: 12.8 % (ref 11.6–14.5)
ERYTHROCYTE [DISTWIDTH] IN BLOOD BY AUTOMATED COUNT: 13.5 % (ref 11.6–14.5)
GLOBULIN SER CALC-MCNC: 2.4 G/DL (ref 2–4)
GLUCOSE SERPL-MCNC: 113 MG/DL (ref 74–99)
HCT VFR BLD AUTO: 21.9 % (ref 35–45)
HCT VFR BLD AUTO: 25.1 % (ref 35–45)
HGB BLD-MCNC: 7.2 G/DL (ref 12–16)
HGB BLD-MCNC: 8.4 G/DL (ref 12–16)
HISTORY CHECKED?,CKHIST: NORMAL
IMM GRANULOCYTES # BLD AUTO: 0 K/UL (ref 0–0.04)
IMM GRANULOCYTES NFR BLD AUTO: 0 % (ref 0–0.5)
LYMPHOCYTES # BLD: 1.6 K/UL (ref 0.9–3.6)
LYMPHOCYTES NFR BLD: 18 % (ref 21–52)
MCH RBC QN AUTO: 31.1 PG (ref 24–34)
MCH RBC QN AUTO: 31.6 PG (ref 24–34)
MCHC RBC AUTO-ENTMCNC: 32.9 G/DL (ref 31–37)
MCHC RBC AUTO-ENTMCNC: 33.5 G/DL (ref 31–37)
MCV RBC AUTO: 93 FL (ref 78–100)
MCV RBC AUTO: 96.1 FL (ref 78–100)
MONOCYTES # BLD: 0.9 K/UL (ref 0.05–1.2)
MONOCYTES NFR BLD: 10 % (ref 3–10)
NEUTS SEG # BLD: 6.5 K/UL (ref 1.8–8)
NEUTS SEG NFR BLD: 70 % (ref 40–73)
NRBC # BLD: 0 K/UL (ref 0–0.01)
NRBC # BLD: 0 K/UL (ref 0–0.01)
NRBC BLD-RTO: 0 PER 100 WBC
NRBC BLD-RTO: 0 PER 100 WBC
PLATELET # BLD AUTO: 168 K/UL (ref 135–420)
PLATELET # BLD AUTO: 207 K/UL (ref 135–420)
PMV BLD AUTO: 9.6 FL (ref 9.2–11.8)
PMV BLD AUTO: 9.7 FL (ref 9.2–11.8)
POTASSIUM SERPL-SCNC: 3.8 MMOL/L (ref 3.5–5.5)
PROT SERPL-MCNC: 5.1 G/DL (ref 6.4–8.2)
RBC # BLD AUTO: 2.28 M/UL (ref 4.2–5.3)
RBC # BLD AUTO: 2.7 M/UL (ref 4.2–5.3)
SODIUM SERPL-SCNC: 136 MMOL/L (ref 136–145)
WBC # BLD AUTO: 9.2 K/UL (ref 4.6–13.2)
WBC # BLD AUTO: 9.4 K/UL (ref 4.6–13.2)

## 2022-05-22 PROCEDURE — 86900 BLOOD TYPING SEROLOGIC ABO: CPT

## 2022-05-22 PROCEDURE — 85027 COMPLETE CBC AUTOMATED: CPT

## 2022-05-22 PROCEDURE — P9016 RBC LEUKOCYTES REDUCED: HCPCS

## 2022-05-22 PROCEDURE — 30233N1 TRANSFUSION OF NONAUTOLOGOUS RED BLOOD CELLS INTO PERIPHERAL VEIN, PERCUTANEOUS APPROACH: ICD-10-PCS | Performed by: STUDENT IN AN ORGANIZED HEALTH CARE EDUCATION/TRAINING PROGRAM

## 2022-05-22 PROCEDURE — 74011250637 HC RX REV CODE- 250/637: Performed by: PHYSICIAN ASSISTANT

## 2022-05-22 PROCEDURE — 80053 COMPREHEN METABOLIC PANEL: CPT

## 2022-05-22 PROCEDURE — 36415 COLL VENOUS BLD VENIPUNCTURE: CPT

## 2022-05-22 PROCEDURE — 77010033678 HC OXYGEN DAILY

## 2022-05-22 PROCEDURE — 74011250637 HC RX REV CODE- 250/637: Performed by: STUDENT IN AN ORGANIZED HEALTH CARE EDUCATION/TRAINING PROGRAM

## 2022-05-22 PROCEDURE — 97530 THERAPEUTIC ACTIVITIES: CPT

## 2022-05-22 PROCEDURE — 97116 GAIT TRAINING THERAPY: CPT

## 2022-05-22 PROCEDURE — 74011250637 HC RX REV CODE- 250/637

## 2022-05-22 PROCEDURE — 85025 COMPLETE CBC W/AUTO DIFF WBC: CPT

## 2022-05-22 PROCEDURE — 65270000029 HC RM PRIVATE

## 2022-05-22 PROCEDURE — 86923 COMPATIBILITY TEST ELECTRIC: CPT

## 2022-05-22 PROCEDURE — 36430 TRANSFUSION BLD/BLD COMPNT: CPT

## 2022-05-22 PROCEDURE — 82272 OCCULT BLD FECES 1-3 TESTS: CPT

## 2022-05-22 RX ORDER — SPIRONOLACTONE 25 MG
TABLET ORAL
COMMUNITY

## 2022-05-22 RX ORDER — SODIUM CHLORIDE 9 MG/ML
250 INJECTION, SOLUTION INTRAVENOUS AS NEEDED
Status: DISCONTINUED | OUTPATIENT
Start: 2022-05-22 | End: 2022-05-22

## 2022-05-22 RX ORDER — SODIUM CHLORIDE 9 MG/ML
250 INJECTION, SOLUTION INTRAVENOUS AS NEEDED
Status: DISCONTINUED | OUTPATIENT
Start: 2022-05-22 | End: 2022-05-23

## 2022-05-22 RX ORDER — POLYETHYLENE GLYCOL 3350 17 G/17G
17 POWDER, FOR SOLUTION ORAL 2 TIMES DAILY
Status: DISCONTINUED | OUTPATIENT
Start: 2022-05-22 | End: 2022-05-23

## 2022-05-22 RX ADMIN — POLYETHYLENE GLYCOL 3350 17 G: 17 POWDER, FOR SOLUTION ORAL at 08:59

## 2022-05-22 RX ADMIN — HYDROCODONE BITARTRATE AND ACETAMINOPHEN 1 TABLET: 7.5; 325 TABLET ORAL at 21:37

## 2022-05-22 RX ADMIN — POLYETHYLENE GLYCOL 3350 17 G: 17 POWDER, FOR SOLUTION ORAL at 17:22

## 2022-05-22 RX ADMIN — PANTOPRAZOLE SODIUM 40 MG: 40 TABLET, DELAYED RELEASE ORAL at 08:58

## 2022-05-22 RX ADMIN — GABAPENTIN 300 MG: 300 CAPSULE ORAL at 21:37

## 2022-05-22 RX ADMIN — SENNOSIDES AND DOCUSATE SODIUM 1 TABLET: 50; 8.6 TABLET ORAL at 08:59

## 2022-05-22 RX ADMIN — MIDODRINE HYDROCHLORIDE 2.5 MG: 2.5 TABLET ORAL at 17:21

## 2022-05-22 RX ADMIN — HYDROCODONE BITARTRATE AND ACETAMINOPHEN 1 TABLET: 7.5; 325 TABLET ORAL at 09:07

## 2022-05-22 RX ADMIN — LEVOTHYROXINE SODIUM 50 MCG: 0.05 TABLET ORAL at 05:00

## 2022-05-22 RX ADMIN — HYDROCODONE BITARTRATE AND ACETAMINOPHEN 1 TABLET: 7.5; 325 TABLET ORAL at 05:00

## 2022-05-22 RX ADMIN — MIDODRINE HYDROCHLORIDE 2.5 MG: 2.5 TABLET ORAL at 08:59

## 2022-05-22 RX ADMIN — HYDROCODONE BITARTRATE AND ACETAMINOPHEN 1 TABLET: 7.5; 325 TABLET ORAL at 17:25

## 2022-05-22 RX ADMIN — HYDROCODONE BITARTRATE AND ACETAMINOPHEN 1 TABLET: 7.5; 325 TABLET ORAL at 01:04

## 2022-05-22 RX ADMIN — HYDROCODONE BITARTRATE AND ACETAMINOPHEN 1 TABLET: 7.5; 325 TABLET ORAL at 13:16

## 2022-05-22 RX ADMIN — PANTOPRAZOLE SODIUM 40 MG: 40 TABLET, DELAYED RELEASE ORAL at 17:21

## 2022-05-22 NOTE — PROGRESS NOTES
Reason for Admission:  Closed fracture of head of right humerus, initial encounter [S42.291A]  Closed fracture of right proximal humerus [S42.201A]                 RUR Score:    8%           Plan for utilizing home health:    no                    Likelihood of Readmission:   LOW                         Transition of Care Plan:              Initial assessment completed with patient. Cognitive status of patient: oriented to time, place, person and situation. Face sheet information confirmed:  yes. The patient designates MINDI Nguyễn([phone]) to participate in her discharge plan and to receive any needed information. This patient lives alone in a apartment. Patient is able to navigate steps as needed. Prior to hospitalization, patient was considered to be independent with ADLs/IADLS : yes . Patient has a current ACP document on file: no      The patient's son  will be available to transport patient home upon discharge. The patient reported no medical equipment available in the home. Patient is not currently active with home health. Patient has not stayed in a skilled nursing facility or rehab. This patient is on dialysis :no     Currently, the discharge plan is Home with family assistance    Per patient, son will be providing assistance in home after discharged. Plan is for outpatient PT and schedule for Wednesday. CM will continue to monitor and assist as needed. The patient states that she can obtain her medications from the pharmacy, and take her medications as directed. Patient's current insurance is [employer]       Care Management Interventions  PCP Verified by CM:  Yes  Mode of Transport at Discharge: Self  Transition of Care Consult (CM Consult): Discharge Planning  Discharge Durable Medical Equipment: No  Physical Therapy Consult: Yes  Occupational Therapy Consult: Yes  Speech Therapy Consult: No  Support Systems: Child(nhung)  Confirm Follow Up Transport: Self  Discharge Location  Patient Expects to be Discharged to[de-identified] Home with family assistance    GALO GonzalezN, RN  Pager # [phone]  Care Manager

## 2022-05-22 NOTE — ROUTINE PROCESS
Bedside shift change report given to AMBER Nicholson (oncoming nurse) by Kadeem Rider RN (offgoing nurse). Report included the following information SBAR, Kardex and MAR.

## 2022-05-22 NOTE — PROGRESS NOTES
Problem: Mobility Impaired (Adult and Pediatric)  Goal: *Acute Goals and Plan of Care (Insert Text)  Description: Physical Therapy Goals  Initiated 5/20/2022 and to be accomplished within 7 day(s)  1. Patient will move from supine to sit and sit to supine , scoot up and down, and roll side to side in bed with modified independence. 2.  Patient will transfer from bed to chair and chair to bed with modified independence using the least restrictive device. 3.  Patient will perform sit to stand with modified independence. 4.  Patient will ambulate with modified independence for 150 feet with the least restrictive device. 5.  Patient will maintain R RTSA precautions as prescribed. PLOF: pt lives alone in a senior apt with elevator access, ind PTA, no DME, son and brother providing support following surgery      Outcome: Progressing Towards Goal   PHYSICAL THERAPY TREATMENT    Patient: Lory Perez (18 y.o. female)  Date: 5/22/2022  Diagnosis: Closed fracture of head of right humerus, initial encounter [S42.291A]  Closed fracture of right proximal humerus [S42.201A] Closed fracture of right proximal humerus  Procedure(s) (LRB):  RIGHT SHOULDER REVERSE TOTAL SHOULDER ARTHROPLASTY/ARTHREX/NERVE BLOCK/2 SA'S (Right) 2 Days Post-Op  Precautions: Fall (R shoulder PROM no ER, AROM elbow, wrist, hand)  PLOF: see above    ASSESSMENT:  Pt cleared for PT treatment session per nursing. Pt received in supine with HOB elevated and agreeable to treatment session. No complaints of dizziness or wooziness today. Sling donned prior to OOB. Pt required SBA for mobility overall including bed mobility, transfers, and ambulation of 200 ft without assistive device. Pt initially ambulating with guarded posture and decreased arm swing due to anxiety. Verbal cues required to improve gait mechanics/decrease gait deviations with 50% carryover noted.  Pt left sitting up in recliner chair, sling donned, ice pack applied, and all needs met/within reach. Pt would benefit from 1-2 more sessions in this setting to increase overall independence and activity tolerance for return home. Progression toward goals:   [x]      Improving appropriately and progressing toward goals  []      Improving slowly and progressing toward goals  []      Not making progress toward goals and plan of care will be adjusted      PLAN:  Patient continues to benefit from skilled intervention to address the above impairments. Continue treatment per established plan of care. Discharge Recommendations:  Outpatient once cleared by MD, family assist at home as needed  Further Equipment Recommendations for Discharge:  N/A     SUBJECTIVE:   Patient stated I feel more confident now that I've been up and walking.     OBJECTIVE DATA SUMMARY:   Critical Behavior:  Neurologic State: Alert  Orientation Level: Oriented X4  Cognition: Follows commands  Safety/Judgement: Awareness of environment,Fall prevention  Functional Mobility Training:  Bed Mobility:  Supine to Sit: Stand-by assistance (HHA for pt to pull up with L UE)  Transfers:  Sit to Stand: Stand-by assistance  Stand to Sit: Stand-by assistance  Balance:  Sitting: Intact  Standing: Impaired; Without support  Standing - Static: Good  Standing - Dynamic : Fair (+)   Ambulation/Gait Training:  Distance (ft): 200 Feet (ft)  Assistive Device:  (none)  Ambulation - Level of Assistance: Stand-by assistance  Gait Abnormalities: Decreased step clearance  Base of Support: Narrowed; Center of gravity altered  Speed/Frannie: Slow  Step Length: Right shortened;Left shortened  Pain:  Pain level pre-treatment: 5/10  Pain level post-treatment: 5/10     Activity Tolerance:   Good  Please refer to the flowsheet for vital signs taken during this treatment.   After treatment:   [x] Patient left in no apparent distress sitting up in chair  [] Patient left in no apparent distress in bed  [x] Call bell left within reach  [x] Nursing notified  [] Caregiver present  [] Bed alarm activated  [] SCDs applied      COMMUNICATION/EDUCATION:   [x]         Role of Physical Therapy in the acute care setting. [x]         Fall prevention education was provided and the patient/caregiver indicated understanding. [x]         Patient/family have participated as able in working toward goals and plan of care. []         Patient/family agree to work toward stated goals and plan of care. []         Patient understands intent and goals of therapy, but is neutral about his/her participation.   []         Patient is unable to participate in stated goals/plan of care: ongoing with therapy staff.  []         Other:        Gibran Pichardo PTA   Time Calculation: 27 mins

## 2022-05-22 NOTE — PROGRESS NOTES
Per Ortho:    Patient seen at bedside sitting up preparing to eat breakfast.  Alert and oriented x3. Overall feeling better. No chest pain, no shortness of breath, no fever chills or night sweats. No dizziness. Patient would like to work with physical therapy this morning    Patient seen by medicine [facility] for management of hypotension. Patient medically improved with midiron. Vital signs as below:  05/22/22 0748 98.3 °F (36.8 °C) 76 96/61 73   16 95 %       05/22/22 0338 98 °F (36.7 °C) 78 96/55 Abnormal  69  At rest 16 94 %       05/21/22 2315 98.8 °F (37.1 °C) 80 106/64 78  At rest 16 93 %       05/21/22 2023 98.7 °F (37.1 °C) 80 110/66 81   16 94 %               Right shoulder pain well controlled with well fitting sling with elbow forearm at 90 degrees to side. Distal sensation intact on the right upper extremity. Resisted right wrist extension noted. Cap refill brisk and less than 2 seconds right upper extremity. Right proximal anterior shoulder surgical site intact with chemical closure noted through the OpSite honeycomb dressing. Surrounding tissue appears intact no erythema. No evidence of fluctuance or induration surrounding the surgical site. Plan: Per orthopedics stable for discharge home early afternoon as long as cleared by general medicine. Follow-up with Dr. Fabiana Archer per protocol. PT okay for out of bed today.

## 2022-05-22 NOTE — PROGRESS NOTES
Problem: Patient Education: Go to Patient Education Activity  Goal: Patient/Family Education  Outcome: Progressing Towards Goal     Problem: Falls - Risk of  Goal: *Absence of Falls  Description: Document Gian Serrano Fall Risk and appropriate interventions in the flowsheet.   Outcome: Progressing Towards Goal  Note: Fall Risk Interventions:  Mobility Interventions: Patient to call before getting OOB         Medication Interventions: Patient to call before getting OOB,Teach patient to arise slowly    Elimination Interventions: Call light in reach    History of Falls Interventions: Door open when patient unattended         Problem: Patient Education: Go to Patient Education Activity  Goal: Patient/Family Education  Outcome: Progressing Towards Goal     Problem: Patient Education: Go to Patient Education Activity  Goal: Patient/Family Education  Outcome: Progressing Towards Goal

## 2022-05-22 NOTE — PROGRESS NOTES
OCCUPATIONAL THERAPY NOTE    Patient: Lory Perez (47 y.o. female)  Date: 5/22/2022  Diagnosis: Closed fracture of head of right humerus, initial encounter [S42.291A]  Closed fracture of right proximal humerus [S42.201A] Closed fracture of right proximal humerus  Procedure(s) (LRB):  RIGHT SHOULDER REVERSE TOTAL SHOULDER ARTHROPLASTY/ARTHREX/NERVE BLOCK/2 SA'S (Right) 2 Days Post-Op  Precautions: Fall (R shoulder PROM no ER, AROM elbow, wrist, hand)  Chart, occupational therapy assessment, plan of care, and goals were reviewed. Occupational Therapy treatment attempted. Patient is unable to participate due to:  []  Nausea/vomiting  []  Eating  []  Pain  []  Pt lethargic  []  Off Unit  [x] Other:  Pt is declining to participate in OT this am including practicing adaptive strategies for UB/LB ADLs. Pt educated on the importance of AROM of R elbow/wrist /hand, she reports she's been unfastening her sling and performing elbow/wrist/hand exercises independently w/o difficulty. Pt declines PROM of R shoulder (08:04). Attempt x2 - pt is receiving blood transfusion, per RN pt will be staying the night in the hospital (13:22)    Will f/u later as schedule allows. Thank you.     Memo Garcia MS, OTR/L

## 2022-05-22 NOTE — PROGRESS NOTES
Intern Progress Note  [facility] [facility]       Patient: Roya Martinez MRN: [id number]  CSN: [id number]    YOB: 1945  Age: 68 y.o. Sex: female    DOA: 5/20/2022 LOS:  LOS: 2 days                    Subjective:    POD#2    Acute events: Hgb 7.2 downtrending from 7.4 yesterday. Hypotensive overnight, as low as 89/54, for which midodrine 2.5mg tid initiated. Pt is seen sitting up in chair this morning. Notes pain of R shoulder after procedure. Denies CP, SOB. Pt informs team she had melena 2/2 what sounds like gastric ulcer, around the time her son was born with multiple life stressors. Notes a 16lb weight loss over the past 2 months, combination of intentional vs unintentional. Denies melena, hematochezia, abdominal pain, bruising. Objective:     Patient Vitals for the past 24 hrs:   Temp Pulse Resp BP SpO2   05/22/22 1345 98.6 °F (37 °C) 82 16 101/64 95 %   05/22/22 1315 98.1 °F (36.7 °C) 80 16 (!) 92/59 96 %   05/22/22 1236 98.3 °F (36.8 °C) 87 16 99/64 95 %   05/22/22 1142 98.6 °F (37 °C) 85 14 (!) 92/55 98 %   05/22/22 0748 98.3 °F (36.8 °C) 76 16 96/61 95 %   05/22/22 0338 98 °F (36.7 °C) 78 16 (!) 96/55 94 %   05/21/22 2315 98.8 °F (37.1 °C) 80 16 106/64 93 %   05/21/22 2023 98.7 °F (37.1 °C) 80 16 110/66 94 %   05/21/22 1619 98 °F (36.7 °C) 78 16 (!) 96/55 96 %         Intake/Output Summary (Last 24 hours) at 5/22/2022 1415  Last data filed at 5/21/2022 2311  Gross per 24 hour   Intake 480 ml   Output    Net 480 ml       Physical Exam:   General: well-appearing, NAD  HEENT: Conjunctiva pink, sclera anicteric. EOMI   CV:  RRR, no M/G/R  RESP: Unlabored breathing. Lungs CTAB, no wheezes, rales or rhonchi appreciated. Equal expansion bilaterally. ABD:  Soft, nontender, nondistended. MS:  R shoulder in soft brace; R shoulder surgical scar under honeycomb dressing C/D/I. Neuro:  5/5 strength bilateral lower extremities. A+Ox3. Ext:  No edema.   2+ radial and dp pulses bilaterally. Skin: Warm & dry. No rashes, lesions, or ulcers. Good turgor. Psych: Normal mood and affect. Lab/Data Reviewed:  Recent Results (from the past 24 hour(s))   CBC W/O DIFF    Collection Time: 05/22/22  2:26 AM   Result Value Ref Range    WBC 9.2 4.6 - 13.2 K/uL    RBC 2.28 (L) 4.20 - 5.30 M/uL    HGB 7.2 (L) 12.0 - 16.0 g/dL    HCT 21.9 (L) 35.0 - 45.0 %    MCV 96.1 78.0 - 100.0 FL    MCH 31.6 24.0 - 34.0 PG    MCHC 32.9 31.0 - 37.0 g/dL    RDW 12.8 11.6 - 14.5 %    PLATELET 063 494 - 518 K/uL    MPV 9.7 9.2 - 11.8 FL    NRBC 0.0 0  WBC    ABSOLUTE NRBC 0.00 0.00 - 0.01 K/uL   RBC, ALLOCATE    Collection Time: 05/22/22  9:45 AM   Result Value Ref Range    HISTORY CHECKED? Historical check performed    TYPE & SCREEN    Collection Time: 05/22/22 10:28 AM   Result Value Ref Range    Crossmatch Expiration 05/25/2022,1878     ABO/Rh(D) Rico Jennifer POSITIVE     Antibody screen NEG     CALLED TO: [facility] AT 1130 ON [id number] BY [facility]     Unit number [id number]     Blood component type RC LR,1     Unit division 00     Status of unit ISSUED     Crossmatch result Compatible        Assessment & Plan:     Jeff Pacheco is a 68 y.o. female w/ a PMH of hypothyroid, GERD and R humeral fx who is admitted for reverse total arthroplasty was consulted for hypotension. Admitting team attempted fluid resuscitation with 500cc of NS and 1L of LR which raised bp from 80s/50s to 90s/50s. MAPs have remained above 65. Tachycardia resolved with the boluses. Of note, pt's Hgb dropped from 13.5 to 7.9 in a 10 day span, which points to volume depletion. She also has been taking norco 7.5mg, but unlikely cause of acute hypotension granted low dose opioid. Anemia 2/2 blood loss  DDX: likely hypovolemic  EGD 2013 shows hiatal hernia, stricture and esophagitis. Hx of hypotension with morphine many years ago.  On nexium (Esomeprazole) outpatient, now on protonix 40 inpatient  Hx of melena at time of birth of son 2/2 gastric ulcer, multiple life stressors including . Pt notes 16lbs wt loss in the past 2 months, combination of intentional and non-intentional  Hgb 13.5 () > 7.9 () > 7.4 > 7.2. Iron panel-Fe low (20), TIBC low, iron % sat low, ferritin normal.  Hemolysis labs (LDH, bili, haptoglobin) wnl. Retic count normal. MCV normal.    Plan  -Pending 1U pRBC's, repeat CBC 1 hour post-transfusion  -CBC q8hr  -FOBT  -cont midodrine 2.5mg tid  -continue monitoring BP, s/s of bleeding  -SCD's for DVT ppx    Closed fx of R proximal humerus  S/p complete R shoulder arthroplasty reversal on . Had a mechanical fall on  while visiting son in [place], slipped on hardwood floor while playing with grandchild. Pt was taking tramadol for pain prior to surgery, but ran out 2 weeks ago and has been taking Tylenol 500-1000mg 2-3x per day. Taking calcium and vitamin D for osteopenia hx, but informs me her last Dexa scan was normal.    Plan  -Norco 7.5q4hr PRN for pain, also dilaudid IV which pt has not needed thus far  -Gabapentin 300tid for pain added while inpt  -FU with  post-op  -PT/OT    Constipation  Last BM was Wednesday, 5 days ago. Has been taking tramadol for pain prior to sx, now on Norco.    Plan  -scheduled miralax bid  -senna    Hypothyroidism  Home levothyroxine 50 mcg, continued inpt. Chronic herpes outbreaks  On buttocks. Takes acyclovir 200mg bid at home as needed. Plan  -pt does not wish for us to schedule it while inpt    Arthritis  Hip, cervical and lumbar spine. Takes NSAIDs at home. Seasonal allergies   Takes Flonase in spring. GERD  Home nexium.     *Fully covid vax x3       Diet Regular   DVT Prophylaxis SCD's   GI Prophylaxis Protonix   Code status FULL   Disposition Home     Point of Contact DIANA  Relationship: Son  [phone]      Josie Burns MD , PGY-1   [address]   May 22, 2022, 3:56 PM

## 2022-05-23 LAB
ABO + RH BLD: NORMAL
ALBUMIN SERPL-MCNC: 2.5 G/DL (ref 3.4–5)
ALBUMIN/GLOB SERPL: 0.9 {RATIO} (ref 0.8–1.7)
ALP SERPL-CCNC: 46 U/L (ref 45–117)
ALT SERPL-CCNC: 10 U/L (ref 13–56)
ANION GAP SERPL CALC-SCNC: 5 MMOL/L (ref 3–18)
AST SERPL-CCNC: 12 U/L (ref 10–38)
BASOPHILS # BLD: 0 K/UL (ref 0–0.1)
BASOPHILS NFR BLD: 0 % (ref 0–2)
BASOPHILS NFR BLD: 0 % (ref 0–2)
BASOPHILS NFR BLD: 1 % (ref 0–2)
BILIRUB SERPL-MCNC: 0.7 MG/DL (ref 0.2–1)
BLD PROD TYP BPU: NORMAL
BLOOD GROUP ANTIBODIES SERPL: NORMAL
BPU ID: NORMAL
BUN SERPL-MCNC: 11 MG/DL (ref 7–18)
BUN/CREAT SERPL: 19 (ref 12–20)
CALCIUM SERPL-MCNC: 8.4 MG/DL (ref 8.5–10.1)
CALLED TO:,BCALL1: NORMAL
CHLORIDE SERPL-SCNC: 105 MMOL/L (ref 100–111)
CO2 SERPL-SCNC: 28 MMOL/L (ref 21–32)
CREAT SERPL-MCNC: 0.58 MG/DL (ref 0.6–1.3)
CROSSMATCH RESULT,%XM: NORMAL
DIFFERENTIAL METHOD BLD: ABNORMAL
EOSINOPHIL # BLD: 0.3 K/UL (ref 0–0.4)
EOSINOPHIL NFR BLD: 3 % (ref 0–5)
EOSINOPHIL NFR BLD: 4 % (ref 0–5)
EOSINOPHIL NFR BLD: 4 % (ref 0–5)
ERYTHROCYTE [DISTWIDTH] IN BLOOD BY AUTOMATED COUNT: 13.4 % (ref 11.6–14.5)
ERYTHROCYTE [DISTWIDTH] IN BLOOD BY AUTOMATED COUNT: 13.5 % (ref 11.6–14.5)
ERYTHROCYTE [DISTWIDTH] IN BLOOD BY AUTOMATED COUNT: 13.7 % (ref 11.6–14.5)
GLOBULIN SER CALC-MCNC: 2.9 G/DL (ref 2–4)
GLUCOSE SERPL-MCNC: 95 MG/DL (ref 74–99)
HCT VFR BLD AUTO: 25.3 % (ref 35–45)
HCT VFR BLD AUTO: 26.7 % (ref 35–45)
HCT VFR BLD AUTO: 27.8 % (ref 35–45)
HEMOCCULT STL QL: NEGATIVE
HGB BLD-MCNC: 8.3 G/DL (ref 12–16)
HGB BLD-MCNC: 8.9 G/DL (ref 12–16)
HGB BLD-MCNC: 9.2 G/DL (ref 12–16)
IMM GRANULOCYTES # BLD AUTO: 0 K/UL (ref 0–0.04)
IMM GRANULOCYTES NFR BLD AUTO: 0 % (ref 0–0.5)
LYMPHOCYTES # BLD: 1.6 K/UL (ref 0.9–3.6)
LYMPHOCYTES # BLD: 1.6 K/UL (ref 0.9–3.6)
LYMPHOCYTES # BLD: 1.7 K/UL (ref 0.9–3.6)
LYMPHOCYTES NFR BLD: 18 % (ref 21–52)
LYMPHOCYTES NFR BLD: 20 % (ref 21–52)
LYMPHOCYTES NFR BLD: 24 % (ref 21–52)
MCH RBC QN AUTO: 30.7 PG (ref 24–34)
MCH RBC QN AUTO: 31.1 PG (ref 24–34)
MCH RBC QN AUTO: 31.9 PG (ref 24–34)
MCHC RBC AUTO-ENTMCNC: 32.8 G/DL (ref 31–37)
MCHC RBC AUTO-ENTMCNC: 33.1 G/DL (ref 31–37)
MCHC RBC AUTO-ENTMCNC: 33.3 G/DL (ref 31–37)
MCV RBC AUTO: 93.7 FL (ref 78–100)
MCV RBC AUTO: 93.9 FL (ref 78–100)
MCV RBC AUTO: 95.7 FL (ref 78–100)
MONOCYTES # BLD: 0.7 K/UL (ref 0.05–1.2)
MONOCYTES # BLD: 0.8 K/UL (ref 0.05–1.2)
MONOCYTES # BLD: 0.8 K/UL (ref 0.05–1.2)
MONOCYTES NFR BLD: 12 % (ref 3–10)
MONOCYTES NFR BLD: 9 % (ref 3–10)
MONOCYTES NFR BLD: 9 % (ref 3–10)
NEUTS SEG # BLD: 4.2 K/UL (ref 1.8–8)
NEUTS SEG # BLD: 5.1 K/UL (ref 1.8–8)
NEUTS SEG # BLD: 6.5 K/UL (ref 1.8–8)
NEUTS SEG NFR BLD: 59 % (ref 40–73)
NEUTS SEG NFR BLD: 67 % (ref 40–73)
NEUTS SEG NFR BLD: 71 % (ref 40–73)
NRBC # BLD: 0 K/UL (ref 0–0.01)
NRBC BLD-RTO: 0 PER 100 WBC
PERIPHERAL SMEAR,PSM: NORMAL
PLATELET # BLD AUTO: 192 K/UL (ref 135–420)
PLATELET # BLD AUTO: 227 K/UL (ref 135–420)
PLATELET # BLD AUTO: 240 K/UL (ref 135–420)
PMV BLD AUTO: 10.1 FL (ref 9.2–11.8)
PMV BLD AUTO: 9.8 FL (ref 9.2–11.8)
PMV BLD AUTO: 9.8 FL (ref 9.2–11.8)
POTASSIUM SERPL-SCNC: 3.7 MMOL/L (ref 3.5–5.5)
PROT SERPL-MCNC: 5.4 G/DL (ref 6.4–8.2)
RBC # BLD AUTO: 2.7 M/UL (ref 4.2–5.3)
RBC # BLD AUTO: 2.79 M/UL (ref 4.2–5.3)
RBC # BLD AUTO: 2.96 M/UL (ref 4.2–5.3)
SODIUM SERPL-SCNC: 138 MMOL/L (ref 136–145)
SPECIMEN EXP DATE BLD: NORMAL
STATUS OF UNIT,%ST: NORMAL
UNIT DIVISION, %UDIV: 0
WBC # BLD AUTO: 7.1 K/UL (ref 4.6–13.2)
WBC # BLD AUTO: 7.7 K/UL (ref 4.6–13.2)
WBC # BLD AUTO: 9.2 K/UL (ref 4.6–13.2)

## 2022-05-23 PROCEDURE — 97530 THERAPEUTIC ACTIVITIES: CPT

## 2022-05-23 PROCEDURE — 85025 COMPLETE CBC W/AUTO DIFF WBC: CPT

## 2022-05-23 PROCEDURE — 65270000029 HC RM PRIVATE

## 2022-05-23 PROCEDURE — 74011250637 HC RX REV CODE- 250/637

## 2022-05-23 PROCEDURE — 77010033678 HC OXYGEN DAILY

## 2022-05-23 PROCEDURE — 80053 COMPREHEN METABOLIC PANEL: CPT

## 2022-05-23 PROCEDURE — 36415 COLL VENOUS BLD VENIPUNCTURE: CPT

## 2022-05-23 PROCEDURE — 97110 THERAPEUTIC EXERCISES: CPT

## 2022-05-23 PROCEDURE — 97116 GAIT TRAINING THERAPY: CPT

## 2022-05-23 PROCEDURE — 74011250637 HC RX REV CODE- 250/637: Performed by: PHYSICIAN ASSISTANT

## 2022-05-23 PROCEDURE — 97535 SELF CARE MNGMENT TRAINING: CPT

## 2022-05-23 PROCEDURE — 74011250637 HC RX REV CODE- 250/637: Performed by: STUDENT IN AN ORGANIZED HEALTH CARE EDUCATION/TRAINING PROGRAM

## 2022-05-23 RX ORDER — AMOXICILLIN 250 MG
1 CAPSULE ORAL 2 TIMES DAILY
Status: DISCONTINUED | OUTPATIENT
Start: 2022-05-23 | End: 2022-05-24 | Stop reason: HOSPADM

## 2022-05-23 RX ORDER — SODIUM CHLORIDE 9 MG/ML
500 INJECTION, SOLUTION INTRAVENOUS ONCE
Status: DISCONTINUED | OUTPATIENT
Start: 2022-05-23 | End: 2022-05-23

## 2022-05-23 RX ORDER — POLYETHYLENE GLYCOL 3350 17 G/17G
34 POWDER, FOR SOLUTION ORAL 2 TIMES DAILY
Status: DISCONTINUED | OUTPATIENT
Start: 2022-05-23 | End: 2022-05-24 | Stop reason: HOSPADM

## 2022-05-23 RX ORDER — COSYNTROPIN 0.25 MG/ML
0.25 INJECTION, POWDER, FOR SOLUTION INTRAMUSCULAR; INTRAVENOUS ONCE
Status: COMPLETED | OUTPATIENT
Start: 2022-05-24 | End: 2022-05-24

## 2022-05-23 RX ADMIN — HYDROCODONE BITARTRATE AND ACETAMINOPHEN 1 TABLET: 7.5; 325 TABLET ORAL at 11:07

## 2022-05-23 RX ADMIN — MIDODRINE HYDROCHLORIDE 2.5 MG: 2.5 TABLET ORAL at 08:29

## 2022-05-23 RX ADMIN — HYDROCODONE BITARTRATE AND ACETAMINOPHEN 1 TABLET: 7.5; 325 TABLET ORAL at 01:58

## 2022-05-23 RX ADMIN — HYDROCODONE BITARTRATE AND ACETAMINOPHEN 1 TABLET: 7.5; 325 TABLET ORAL at 23:33

## 2022-05-23 RX ADMIN — MIDODRINE HYDROCHLORIDE 2.5 MG: 2.5 TABLET ORAL at 18:19

## 2022-05-23 RX ADMIN — DOCUSATE SODIUM 50MG AND SENNOSIDES 8.6MG 1 TABLET: 8.6; 5 TABLET, FILM COATED ORAL at 08:30

## 2022-05-23 RX ADMIN — HYDROCODONE BITARTRATE AND ACETAMINOPHEN 1 TABLET: 7.5; 325 TABLET ORAL at 06:09

## 2022-05-23 RX ADMIN — GABAPENTIN 300 MG: 300 CAPSULE ORAL at 21:16

## 2022-05-23 RX ADMIN — POLYETHYLENE GLYCOL 3350 34 G: 17 POWDER, FOR SOLUTION ORAL at 08:30

## 2022-05-23 RX ADMIN — LEVOTHYROXINE SODIUM 50 MCG: 0.05 TABLET ORAL at 06:09

## 2022-05-23 RX ADMIN — PANTOPRAZOLE SODIUM 40 MG: 40 TABLET, DELAYED RELEASE ORAL at 06:09

## 2022-05-23 RX ADMIN — PANTOPRAZOLE SODIUM 40 MG: 40 TABLET, DELAYED RELEASE ORAL at 18:19

## 2022-05-23 RX ADMIN — HYDROCODONE BITARTRATE AND ACETAMINOPHEN 1 TABLET: 7.5; 325 TABLET ORAL at 18:19

## 2022-05-23 NOTE — PROGRESS NOTES
Problem: Mobility Impaired (Adult and Pediatric)  Goal: *Acute Goals and Plan of Care (Insert Text)  Description: Physical Therapy Goals  Initiated 2022 and to be accomplished within 7 day(s)  1. Patient will move from supine to sit and sit to supine , scoot up and down, and roll side to side in bed with modified independence. 2.  Patient will transfer from bed to chair and chair to bed with modified independence using the least restrictive device. 3.  Patient will perform sit to stand with modified independence. 4.  Patient will ambulate with modified independence for 150 feet with the least restrictive device. 5.  Patient will maintain R RTSA precautions as prescribed. PLOF: pt lives alone in a senior apt with elevator access, ind PTA, no DME, son and brother providing support following surgery      Outcome: Progressing Towards Goal     PHYSICAL THERAPY TREATMENT    Patient: Christina Da Silva (29 y.o. female)  Date: 2022  Diagnosis: Closed fracture of head of right humerus, initial encounter [S42.291A]  Closed fracture of right proximal humerus [S42.201A] Closed fracture of right proximal humerus  Procedure(s) (LRB):  RIGHT SHOULDER REVERSE TOTAL SHOULDER ARTHROPLASTY/ARTHREX/NERVE BLOCK/2 SA'S (Right) 3 Days Post-Op  Precautions: Fall  PLOF: Independent    ASSESSMENT:  Pt continues to present with mild balance deficits and decreased generalized strength; however, is progressing well, as evidenced by ability to ambulate an increased distance today with less assist for all mobility. Pt ambulated with occasional furniture grabbing in hallways, reports this is how she navigates at home in her studio apartment due to fear of falling. Pt reports if she goes out into the community, she has someone with her to assist if needed. Pt performed seated therex in chair, sitting up at end of session with RUE elevated. BP after ambulatin/55, HR 71.   Progression toward goals:   [x]      Improving appropriately and progressing toward goals  []      Improving slowly and progressing toward goals  []      Not making progress toward goals and plan of care will be adjusted     PLAN:  Patient continues to benefit from skilled intervention to address the above impairments. Continue treatment per established plan of care. Discharge Recommendations:  Home Health  Further Equipment Recommendations for Discharge:  straight cane     SUBJECTIVE:   Patient stated thank you.     OBJECTIVE DATA SUMMARY:   Critical Behavior:  Neurologic State: Alert  Orientation Level: Oriented X4  Cognition: Appropriate decision making,Appropriate for age attention/concentration,Appropriate safety awareness,Follows commands  Safety/Judgement: Awareness of environment,Fall prevention  Functional Mobility Training:  Transfers:  Sit to Stand: Stand-by assistance  Stand to Sit: Stand-by assistance  Balance:  Sitting: Intact  Standing: Impaired  Standing - Static: Good  Standing - Dynamic : Fair (Fair+)   Posture:   Posture (WDL): Within defined limits  Ambulation/Gait Training:  Distance (ft): 250 Feet (ft)  Assistive Device:  (none, occasional furniture grabbing in hallway)  Ambulation - Level of Assistance: Stand-by assistance  Gait Description (WDL): Exceptions to WDL  Gait Abnormalities: Decreased step clearance  Right Side Weight Bearing:  (RUE NWB)  Base of Support: Center of gravity altered;Narrowed  Speed/Frannie: Pace decreased (<100 feet/min); Slow  Step Length: Right shortened;Left shortened  Therapeutic Exercises:         EXERCISE   Sets   Reps   Active Active Assist   Passive Self ROM   Comments   Ankle Pumps    [] [] [] []    Quad Sets/Glut Sets    [] [] [] [] Hold for 5 secs   Hamstring Sets   [] [] [] []    Short Arc Quads   [] [] [] []    Heel Slides   [] [] [] []    Straight Leg Raises   [] [] [] []    Hip Add 1 10 [x] [] [] [] Hold for 5 secs, w/ pillow squeeze   Long Arc Quads 1 15 [x] [] [] []    Seated Marching 1 10 [x] [] [] []    Standing Marching   [] [] [] []    Seated Abduction with manual resistance 1 10 [] [] [] [] Hold 3 seconds       Pain:  Pain level pre-treatment: 0/10  Pain level post-treatment: 0/10   Pain Intervention(s): Medication (see MAR); Rest, Ice, Repositioning   Response to intervention: Nurse notified, See doc flow    Activity Tolerance:   Good  Please refer to the flowsheet for vital signs taken during this treatment. After treatment:   [x] Patient left in no apparent distress sitting up in chair  [] Patient left in no apparent distress in bed  [x] Call bell left within reach  [x] Nursing notified  [] Caregiver present  [] Bed alarm activated  [] SCDs applied      COMMUNICATION/EDUCATION:   [x]         Role of Physical Therapy in the acute care setting. [x]         Fall prevention education was provided and the patient/caregiver indicated understanding. [x]         Patient/family have participated as able in working toward goals and plan of care. []         Patient/family agree to work toward stated goals and plan of care. []         Patient understands intent and goals of therapy, but is neutral about his/her participation.   []         Patient is unable to participate in stated goals/plan of care: ongoing with therapy staff.  []         Other:        Blue Morales, PT   Time Calculation: 23 mins

## 2022-05-23 NOTE — PROGRESS NOTES
[facility]  [facility]      OBJECTIVE  Visit Vitals  /78   Pulse 78   Temp 98.6 °F (37 °C)   Resp 16   Ht 5' 2\" (1.575 m)   Wt 59.9 kg (132 lb)   SpO2 98%   BMI 24.14 kg/m²       PERTINENT LABS/IMAGING:  H/H 8.3/25.3  Colonoscopy 2013-mild diverticulitis  EGD 2013-esophagitis, small hiatal hernia    TREATMENT PLAN UPDATES:  -CBC q8hr, next 1pm  -monitor BP  -cont midodrine 2.5mg tid  -encourage PO intake  -FOBT   -double miralax dose, schedule senna-docusate bid  -GI outpt FU    See daily progress note for full assessment/plan.      Princess Berg MD, PGY-1   [facility]   May 23, 2022, 05/23/22

## 2022-05-23 NOTE — PROGRESS NOTES
Problem: Self Care Deficits Care Plan (Adult)  Goal: *Acute Goals and Plan of Care (Insert Text)  Description: Occupational Therapy Goals  Initiated 5/21/2022 within 7 day(s). 1.  Patient will perform RUE AROM for elbow/wrist/hand with modified independence for 8 min in preparation for functional use for ADLs. 2.  Patient will participate in RUE shoulder PROM (no ER) for 8 min with <2 rest breaks in preparation for functional use for ADLs . 3. Patient will perform self-feeding and grooming with supervision/set-up. 4.  Patient will perform toilet transfers with supervision/set-up. 5.  Patient will perform all aspects of toileting with supervision/set-up. 6.  Patient will perform upper body dressing with supervision. 7.  Patient will perform lower body dressing with supervision. 8.  Patient will utilize energy conservation techniques during functional activities with verbal cues. Prior Level of Function: Pt reports being Mod Ind for ADLs and functional mobility. Her son and brother will be staying with her to assist prn. Outcome: Progressing Towards Goal   OCCUPATIONAL THERAPY TREATMENT    Patient: Brandy Moran (12 y.o. female)  Date: 5/23/2022  Diagnosis: Closed fracture of head of right humerus, initial encounter [S42.291A]  Closed fracture of right proximal humerus [S42.201A] Closed fracture of right proximal humerus  Procedure(s) (LRB):  RIGHT SHOULDER REVERSE TOTAL SHOULDER ARTHROPLASTY/ARTHREX/NERVE BLOCK/2 SA'S (Right) 3 Days Post-Op  Precautions: Fall  PLOF: Pt reports being Mod Ind for ADLs and functional mobility. Her son and brother will be staying with her to assist prn. Chart, occupational therapy assessment, plan of care, and goals were reviewed. ASSESSMENT:  Pt presented sitting EOB upon therapist arrival, R UE sling intact, and agreeable to work with OT. Pt requesting to use BR. STS transfer SBA without AD and maneuvered into BR ~ 10 ft SBA without AD.  Pt performed toilet transfer with Supervision and demonstrated toileting routine with Supervision. Pt returned back to sitting in reclining chair for seated rest  break ~ 10 ft w/ SBA. She was provided new sling for R UE to increased (I) requiring MIN A donning to adjust for proper fit. PT entered room and joined session for increased safety. Pt performed functional room and hallway mobility ~ 250 ft with SBA without AD however noted slight unsteadiness having to occasionally grab hallway rails to maintain balance. Pt returned back to room and left in chair with PT at end of session. Vitals assessed; /55 and HR 71 bpm.   Progression toward goals:  []          Improving appropriately and progressing toward goals  [x]          Improving slowly and progressing toward goals  []          Not making progress toward goals and plan of care will be adjusted     PLAN:  Patient continues to benefit from skilled intervention to address the above impairments. Continue treatment per established plan of care. Discharge Recommendations:  [facility]   Further Equipment Recommendations for Discharge:  Straight Cane     SUBJECTIVE:   Patient stated  This sling fits much better. \"     OBJECTIVE DATA SUMMARY:   Cognitive/Behavioral Status:  Neurologic State: Alert  Orientation Level: Oriented X4  Cognition: Appropriate decision making,Appropriate for age attention/concentration,Appropriate safety awareness,Follows commands  Safety/Judgement: Awareness of environment,Fall prevention    Functional Mobility and Transfers for ADLs:      Transfers:  Sit to Stand: Stand-by assistance  Stand to Sit: Stand-by assistance          Toilet Transfer : Supervision          Balance:  Sitting: Intact  Standing: Impaired  Standing - Static: Good  Standing - Dynamic : Fair (Fair+)    ADL Intervention:     Upper Body Dressing Assistance  Orthotics(Brace):  Minimum assistance (donning R UE sling)         Toileting  Bladder Hygiene: Supervision  Adaptive Equipment: Elevated seat;Grab bars    Pain:  Pain level pre-treatment: 0/10   Pain level post-treatment: 0/10    Activity Tolerance:    Fair   Please refer to the flowsheet for vital signs taken during this treatment. After treatment:   [x]  Patient left in no apparent distress sitting up in chair w/ PT  []  Patient left in no apparent distress in bed  [x]  Call bell left within reach  [x]  Nursing notified  []  Caregiver present  []  Bed alarm activated    COMMUNICATION/EDUCATION:   [x] Role of Occupational Therapy in the acute care setting  [x] Home safety education was provided and the patient/caregiver indicated understanding. [x] Patient/family have participated as able in working towards goals and plan of care. [x] Patient/family agree to work toward stated goals and plan of care. [] Patient understands intent and goals of therapy, but is neutral about his/her participation. [] Patient is unable to participate in goal setting and plan of care.       Thank you for this referral.  JEAN-CLAUDE Sahni  Time Calculation: 24 mins

## 2022-05-23 NOTE — PROGRESS NOTES
Chart reviewed. CM spoke with the patient. She declined home health services, shower chair and cane. Discharge plan is home then to her brothers house for a few weeks.  will continue to monitor and assist with transition of care needs.     MEGAN Marie, RN  Care Management  Pager # [phone]

## 2022-05-23 NOTE — ROUTINE PROCESS
Bedside shift change report given to Meng Howard RN (oncoming nurse) by Malina Vo RN (offgoing nurse). Report included the following information SBAR, Kardex and MAR.

## 2022-05-23 NOTE — PROGRESS NOTES
Intern Progress Note  [address] [facility]       Patient: Rick Kern MRN: [id number]  CSN: [id number]    YOB: 1945  Age: 68 y.o. Sex: female    DOA: 5/20/2022 LOS:  LOS: 3 days                    Subjective:    POD#3    Acute events: Hgb 8.4 after 1U pRBC yesterday, stable at 8.3. Pt states she is doing \"swell\" this morning. Slept on and off. Current surgical site pain 4-5/10; feels like pain is well controlled on Norco 7.5-informs me ortho prescribed her the same for after discharge too. Denies CP, SOB, leg pain/swelling. Pt notes she was a little bit lightheaded yesterday after ambulating, and her legs felt weak. Pt states she still has not had a BM yet. Denies any abd pain, N/V. Appetite is returning. Objective:     Patient Vitals for the past 24 hrs:   Temp Pulse Resp BP SpO2   05/23/22 0328 98.8 °F (37.1 °C) 93 16 107/62 92 %   05/22/22 1600 98.3 °F (36.8 °C) 85 16 (!) 107/58 94 %   05/22/22 1515 99 °F (37.2 °C) 76 16 (!) 95/59 95 %   05/22/22 1415 98.5 °F (36.9 °C) 82 16 100/60 95 %   05/22/22 1345 98.6 °F (37 °C) 82 16 101/64 95 %   05/22/22 1315 98.1 °F (36.7 °C) 80 16 (!) 92/59 96 %   05/22/22 1236 98.3 °F (36.8 °C) 87 16 99/64 95 %   05/22/22 1142 98.6 °F (37 °C) 85 14 (!) 92/55 98 %   05/22/22 0748 98.3 °F (36.8 °C) 76 16 96/61 95 %         Intake/Output Summary (Last 24 hours) at 5/23/2022 0738  Last data filed at 5/22/2022 2233  Gross per 24 hour   Intake 762 ml   Output    Net 762 ml       Physical Exam:   General: well-appearing, NAD  HEENT: Conjunctiva pink, sclera anicteric. EOMI   CV:  RRR, no M/G/R  RESP: Unlabored breathing. Lungs CTAB, no wheezes, rales or rhonchi appreciated. Equal expansion bilaterally. ABD:  Soft, nontender, nondistended. MS:  R shoulder in soft brace; R shoulder surgical scar under honeycomb dressing C/D/I. Limited ROM 2/2 pain. Neuro:  5/5 strength bilateral lower extremities. A+Ox3. Ext:  No edema.   2+ radial and dp pulses bilaterally. Skin: Warm & dry. No rashes, lesions, or ulcers. Good turgor. Psych: Normal mood and affect. Lab/Data Reviewed:  Recent Results (from the past 24 hour(s))   RBC, ALLOCATE    Collection Time: 05/22/22  9:45 AM   Result Value Ref Range    HISTORY CHECKED? Historical check performed    TYPE & SCREEN    Collection Time: 05/22/22 10:28 AM   Result Value Ref Range    Crossmatch Expiration 05/25/2022,2359     ABO/Rh(D) Pawel Diaz POSITIVE     Antibody screen NEG     CALLED TO: DARRICK EJNESHADORITA AT 1130 ON [id number] BY [facility]     Unit number [id number]     Blood component type RC LR,1     Unit division 00     Status of unit ISSUED     Crossmatch result Compatible    CBC WITH AUTOMATED DIFF    Collection Time: 05/22/22  8:15 PM   Result Value Ref Range    WBC 9.4 4.6 - 13.2 K/uL    RBC 2.70 (L) 4.20 - 5.30 M/uL    HGB 8.4 (L) 12.0 - 16.0 g/dL    HCT 25.1 (L) 35.0 - 45.0 %    MCV 93.0 78.0 - 100.0 FL    MCH 31.1 24.0 - 34.0 PG    MCHC 33.5 31.0 - 37.0 g/dL    RDW 13.5 11.6 - 14.5 %    PLATELET 239 182 - 005 K/uL    MPV 9.6 9.2 - 11.8 FL    NRBC 0.0 0  WBC    ABSOLUTE NRBC 0.00 0.00 - 0.01 K/uL    NEUTROPHILS 70 40 - 73 %    LYMPHOCYTES 18 (L) 21 - 52 %    MONOCYTES 10 3 - 10 %    EOSINOPHILS 3 0 - 5 %    BASOPHILS 0 0 - 2 %    IMMATURE GRANULOCYTES 0 0.0 - 0.5 %    ABS. NEUTROPHILS 6.5 1.8 - 8.0 K/UL    ABS. LYMPHOCYTES 1.6 0.9 - 3.6 K/UL    ABS. MONOCYTES 0.9 0.05 - 1.2 K/UL    ABS. EOSINOPHILS 0.2 0.0 - 0.4 K/UL    ABS. BASOPHILS 0.0 0.0 - 0.1 K/UL    ABS. IMM.  GRANS. 0.0 0.00 - 0.04 K/UL    DF AUTOMATED     METABOLIC PANEL, COMPREHENSIVE    Collection Time: 05/22/22  8:15 PM   Result Value Ref Range    Sodium 136 136 - 145 mmol/L    Potassium 3.8 3.5 - 5.5 mmol/L    Chloride 104 100 - 111 mmol/L    CO2 27 21 - 32 mmol/L    Anion gap 5 3.0 - 18 mmol/L    Glucose 113 (H) 74 - 99 mg/dL    BUN 14 7.0 - 18 MG/DL    Creatinine 0.59 (L) 0.6 - 1.3 MG/DL    BUN/Creatinine ratio 24 (H) 12 - 20      GFR est AA >60 >60 ml/min/1.73m2    GFR est non-AA >60 >60 ml/min/1.73m2    Calcium 8.4 (L) 8.5 - 10.1 MG/DL    Bilirubin, total 1.2 (H) 0.2 - 1.0 MG/DL    ALT (SGPT) 9 (L) 13 - 56 U/L    AST (SGOT) 14 10 - 38 U/L    Alk. phosphatase 51 45 - 117 U/L    Protein, total 5.1 (L) 6.4 - 8.2 g/dL    Albumin 2.7 (L) 3.4 - 5.0 g/dL    Globulin 2.4 2.0 - 4.0 g/dL    A-G Ratio 1.1 0.8 - 1.7     CBC WITH AUTOMATED DIFF    Collection Time: 05/23/22  4:58 AM   Result Value Ref Range    WBC 7.1 4.6 - 13.2 K/uL    RBC 2.70 (L) 4.20 - 5.30 M/uL    HGB 8.3 (L) 12.0 - 16.0 g/dL    HCT 25.3 (L) 35.0 - 45.0 %    MCV 93.7 78.0 - 100.0 FL    MCH 30.7 24.0 - 34.0 PG    MCHC 32.8 31.0 - 37.0 g/dL    RDW 13.7 11.6 - 14.5 %    PLATELET 168 974 - 542 K/uL    MPV 10.1 9.2 - 11.8 FL    NRBC 0.0 0  WBC    ABSOLUTE NRBC 0.00 0.00 - 0.01 K/uL    NEUTROPHILS 59 40 - 73 %    LYMPHOCYTES 24 21 - 52 %    MONOCYTES 12 (H) 3 - 10 %    EOSINOPHILS 4 0 - 5 %    BASOPHILS 1 0 - 2 %    IMMATURE GRANULOCYTES 0 0.0 - 0.5 %    ABS. NEUTROPHILS 4.2 1.8 - 8.0 K/UL    ABS. LYMPHOCYTES 1.7 0.9 - 3.6 K/UL    ABS. MONOCYTES 0.8 0.05 - 1.2 K/UL    ABS. EOSINOPHILS 0.3 0.0 - 0.4 K/UL    ABS. BASOPHILS 0.0 0.0 - 0.1 K/UL    ABS. IMM. GRANS. 0.0 0.00 - 0.04 K/UL    DF AUTOMATED     METABOLIC PANEL, COMPREHENSIVE    Collection Time: 05/23/22  4:58 AM   Result Value Ref Range    Sodium 138 136 - 145 mmol/L    Potassium 3.7 3.5 - 5.5 mmol/L    Chloride 105 100 - 111 mmol/L    CO2 28 21 - 32 mmol/L    Anion gap 5 3.0 - 18 mmol/L    Glucose 95 74 - 99 mg/dL    BUN 11 7.0 - 18 MG/DL    Creatinine 0.58 (L) 0.6 - 1.3 MG/DL    BUN/Creatinine ratio 19 12 - 20      GFR est AA >60 >60 ml/min/1.73m2    GFR est non-AA >60 >60 ml/min/1.73m2    Calcium 8.4 (L) 8.5 - 10.1 MG/DL    Bilirubin, total 0.7 0.2 - 1.0 MG/DL    ALT (SGPT) 10 (L) 13 - 56 U/L    AST (SGOT) 12 10 - 38 U/L    Alk.  phosphatase 46 45 - 117 U/L    Protein, total 5.4 (L) 6.4 - 8.2 g/dL    Albumin 2.5 (L) 3.4 - 5.0 g/dL Globulin 2.9 2.0 - 4.0 g/dL    A-G Ratio 0.9 0.8 - 1.7         Assessment & Plan:     Adriane Son is a 68 y.o. female w/ a PMH of hypothyroid, GERD and R humeral fx who is admitted for reverse total arthroplasty was consulted for hypotension. Admitting team attempted fluid resuscitation with 500cc of NS and 1L of LR which raised bp from 80s/50s to 90s/50s. MAPs have remained above 65. Tachycardia resolved with the boluses. Of note, pt's Hgb dropped from 13.5 to 7.9 in a 10 day span, which points to volume depletion. She also has been taking norco 7.5mg, but unlikely cause of acute hypotension granted low dose opioid. Anemia 2/2 blood loss  DDX: likely hypovolemic  EGD  shows hiatal hernia, stricture and esophagitis. Hx of hypotension with morphine many years ago. On nexium (Esomeprazole) outpatient, now on protonix 40 inpatient  Hx of melena at time of birth of son 2/2 gastric ulcer, multiple life stressors including . Pt notes 16lbs wt loss in the past 2 months, combination of intentional and non-intentional  Hgb 13.5 () > 7.9 () > 7.4 > 7.2 > 1U > 8.4 > 8.3  Iron panel-Fe low (20), TIBC low, iron % sat low, ferritin normal.  Hemolysis labs (LDH, bili, haptoglobin) wnl. Retic count normal. MCV normal.    Plan  -CBC q8hr  -FOBT  -cont midodrine 2.5mg tid  -continue monitoring BP, s/s of bleeding  -SCD's for DVT ppx    Closed fx of R proximal humerus  S/p complete R shoulder arthroplasty reversal on . Had a mechanical fall on  while visiting son in [place], slipped on hardwood floor while playing with grandchild. Pt was taking tramadol for pain prior to surgery, but ran out 2 weeks ago and has been taking Tylenol 500-1000mg 2-3x per day.   Taking calcium and vitamin D for osteopenia hx, but informs me her last Dexa scan was normal.    Plan  -Norco 7.5q4hr PRN for pain, also dilaudid IV which pt has not needed thus far  -Gabapentin 300tid for pain added while inpt  -FU with  post-op  - Pt has rx for Norco at dc from Ortho  -PT/OT    Constipation  Last BM was Wednesday, 5 days ago. Has been taking tramadol for pain prior to sx, now on Norco.    Plan  -scheduled miralax bid, double dose today  -senna, increase to bid    Hypothyroidism  Home levothyroxine 50 mcg, continued inpt. Chronic herpes outbreaks  On buttocks. Takes acyclovir 200mg bid at home as needed. Plan  -pt does not wish for us to schedule it while inpt    Arthritis  Hip, cervical and lumbar spine. Takes NSAIDs at home. Seasonal allergies   Takes Flonase in spring. GERD  Home nexium.     *Fully covid vax x3       Diet Regular   DVT Prophylaxis SCD's   GI Prophylaxis Protonix   Code status FULL   Disposition Home     Point of Contact DIANA  Relationship: Son  Brandon Valente MD , PGY-1   [address]   May 23, 2022, 7:49 AM

## 2022-05-23 NOTE — PROGRESS NOTES
[facility]    Progress Note      Patient: Brandy Moran               Sex: female          DOA: 5/20/2022         YOB: 1945      Age:  68 y.o.        LOS:  LOS: 3 days         S/P  Procedure(s):  RIGHT SHOULDER REVERSE TOTAL SHOULDER ARTHROPLASTY/ARTHREX/NERVE BLOCK/2 SA'S               Subjective: Moderated pain but doing well. No BM in hospital yet. Medicine ordered miralax double dose today    Denies cp, sob, abd pain   Objective:      Blood pressure 115/78, pulse 78, temperature 98.6 °F (37 °C), resp. rate 16, height 5' 2\" (1.575 m), weight 132 lb (59.9 kg), SpO2 98 %.    Well developed, Well Nourished alert, cooperative, no distress  Incision clean, dry, no drainage, dressing in place  swelling and tenderness noted in right upper extremity  Sensory is intact   Motor is intact  nv intact  2+distal pulses  Neg calf tenderness    Labs:  CBC  @  CBC:   Lab Results   Component Value Date/Time    WBC 7.1 05/23/2022 04:58 AM    RBC 2.70 (L) 05/23/2022 04:58 AM    HGB 8.3 (L) 05/23/2022 04:58 AM    HCT 25.3 (L) 05/23/2022 04:58 AM    PLATELET 043 38/24/2211 04:58 AM     BMP:   Lab Results   Component Value Date/Time    Glucose 95 05/23/2022 04:58 AM    Sodium 138 05/23/2022 04:58 AM    Potassium 3.7 05/23/2022 04:58 AM    Chloride 105 05/23/2022 04:58 AM    CO2 28 05/23/2022 04:58 AM    BUN 11 05/23/2022 04:58 AM    Creatinine 0.58 (L) 05/23/2022 04:58 AM    Calcium 8.4 (L) 05/23/2022 04:58 AM   @  Coagulation  Lab Results   Component Value Date    INR 0.98 05/11/2022    APTT 27 05/11/2022      Basic Metabolic Profile  Lab Results   Component Value Date     05/23/2022    CO2 28 05/23/2022    BUN 11 05/23/2022       Medications Reviewed      Assessment/Plan     Principal Problem:    Closed fracture of right proximal humerus (5/20/2022)    Active Problems:    Hypotension (5/22/2022)        Procedure(s):  RIGHT SHOULDER REVERSE TOTAL SHOULDER ARTHROPLASTY/ARTHREX/NERVE BLOCK/2 SA'S :     1. PT and/or OT Consults: YES continue PT/OT: oob to chair, gentle rom                                                 2. Incision Care:  Routine Incision Care and Dressing Changes as necessary: dressing changes POD#2 and as needed  3. Pain control  4. oob to chair and SCD for DVT prophylaxis  5. Acute blood loss anemia - no evidence of surgical bleeding. H&H stable s/p 1 unit transfusion  6. Hypotension - secondary to acute blood loss anemia and low PO intake    OK for discharge to home    4.  DISCHARGE PLANNING     Intervention : [id number] [facility]  [phone]

## 2022-05-24 VITALS
HEART RATE: 73 BPM | DIASTOLIC BLOOD PRESSURE: 65 MMHG | HEIGHT: 62 IN | WEIGHT: 132 LBS | SYSTOLIC BLOOD PRESSURE: 103 MMHG | RESPIRATION RATE: 14 BRPM | BODY MASS INDEX: 24.29 KG/M2 | TEMPERATURE: 97.7 F | OXYGEN SATURATION: 93 %

## 2022-05-24 LAB
ALBUMIN SERPL-MCNC: 2.5 G/DL (ref 3.4–5)
ALBUMIN/GLOB SERPL: 1 {RATIO} (ref 0.8–1.7)
ALP SERPL-CCNC: 51 U/L (ref 45–117)
ALT SERPL-CCNC: 7 U/L (ref 13–56)
ANION GAP SERPL CALC-SCNC: 3 MMOL/L (ref 3–18)
AST SERPL-CCNC: 12 U/L (ref 10–38)
BASOPHILS # BLD: 0 K/UL (ref 0–0.1)
BASOPHILS # BLD: 0 K/UL (ref 0–0.1)
BASOPHILS NFR BLD: 1 % (ref 0–2)
BASOPHILS NFR BLD: 1 % (ref 0–2)
BILIRUB SERPL-MCNC: 0.6 MG/DL (ref 0.2–1)
BUN SERPL-MCNC: 13 MG/DL (ref 7–18)
BUN/CREAT SERPL: 21 (ref 12–20)
CALCIUM SERPL-MCNC: 8.7 MG/DL (ref 8.5–10.1)
CHLORIDE SERPL-SCNC: 105 MMOL/L (ref 100–111)
CO2 SERPL-SCNC: 31 MMOL/L (ref 21–32)
CORTIS 1H P CHAL SERPL-MCNC: 22.3 UG/DL
CORTIS 30M P CHAL SERPL-MCNC: 19.9 UG/DL
CORTIS BS SERPL-MCNC: 4.3 UG/DL
CREAT SERPL-MCNC: 0.63 MG/DL (ref 0.6–1.3)
DIFFERENTIAL METHOD BLD: ABNORMAL
DIFFERENTIAL METHOD BLD: ABNORMAL
EOSINOPHIL # BLD: 0.1 K/UL (ref 0–0.4)
EOSINOPHIL # BLD: 0.2 K/UL (ref 0–0.4)
EOSINOPHIL NFR BLD: 1 % (ref 0–5)
EOSINOPHIL NFR BLD: 4 % (ref 0–5)
ERYTHROCYTE [DISTWIDTH] IN BLOOD BY AUTOMATED COUNT: 13.3 % (ref 11.6–14.5)
ERYTHROCYTE [DISTWIDTH] IN BLOOD BY AUTOMATED COUNT: 13.3 % (ref 11.6–14.5)
GLOBULIN SER CALC-MCNC: 2.6 G/DL (ref 2–4)
GLUCOSE SERPL-MCNC: 99 MG/DL (ref 74–99)
HCT VFR BLD AUTO: 25 % (ref 35–45)
HCT VFR BLD AUTO: 25.6 % (ref 35–45)
HGB BLD-MCNC: 8.2 G/DL (ref 12–16)
HGB BLD-MCNC: 8.6 G/DL (ref 12–16)
IMM GRANULOCYTES # BLD AUTO: 0 K/UL (ref 0–0.04)
IMM GRANULOCYTES # BLD AUTO: 0 K/UL (ref 0–0.04)
IMM GRANULOCYTES NFR BLD AUTO: 0 % (ref 0–0.5)
IMM GRANULOCYTES NFR BLD AUTO: 1 % (ref 0–0.5)
LYMPHOCYTES # BLD: 0.6 K/UL (ref 0.9–3.6)
LYMPHOCYTES # BLD: 1.9 K/UL (ref 0.9–3.6)
LYMPHOCYTES NFR BLD: 10 % (ref 21–52)
LYMPHOCYTES NFR BLD: 30 % (ref 21–52)
MCH RBC QN AUTO: 31.1 PG (ref 24–34)
MCH RBC QN AUTO: 31.4 PG (ref 24–34)
MCHC RBC AUTO-ENTMCNC: 32.8 G/DL (ref 31–37)
MCHC RBC AUTO-ENTMCNC: 33.6 G/DL (ref 31–37)
MCV RBC AUTO: 93.4 FL (ref 78–100)
MCV RBC AUTO: 94.7 FL (ref 78–100)
MONOCYTES # BLD: 0.3 K/UL (ref 0.05–1.2)
MONOCYTES # BLD: 0.6 K/UL (ref 0.05–1.2)
MONOCYTES NFR BLD: 10 % (ref 3–10)
MONOCYTES NFR BLD: 5 % (ref 3–10)
NEUTS SEG # BLD: 3.5 K/UL (ref 1.8–8)
NEUTS SEG # BLD: 5.6 K/UL (ref 1.8–8)
NEUTS SEG NFR BLD: 56 % (ref 40–73)
NEUTS SEG NFR BLD: 84 % (ref 40–73)
NRBC # BLD: 0 K/UL (ref 0–0.01)
NRBC # BLD: 0 K/UL (ref 0–0.01)
NRBC BLD-RTO: 0 PER 100 WBC
NRBC BLD-RTO: 0 PER 100 WBC
PLATELET # BLD AUTO: 229 K/UL (ref 135–420)
PLATELET # BLD AUTO: 243 K/UL (ref 135–420)
PMV BLD AUTO: 10.2 FL (ref 9.2–11.8)
PMV BLD AUTO: 9.7 FL (ref 9.2–11.8)
POTASSIUM SERPL-SCNC: 4.4 MMOL/L (ref 3.5–5.5)
PROT SERPL-MCNC: 5.1 G/DL (ref 6.4–8.2)
RBC # BLD AUTO: 2.64 M/UL (ref 4.2–5.3)
RBC # BLD AUTO: 2.74 M/UL (ref 4.2–5.3)
SODIUM SERPL-SCNC: 139 MMOL/L (ref 136–145)
WBC # BLD AUTO: 6.3 K/UL (ref 4.6–13.2)
WBC # BLD AUTO: 6.6 K/UL (ref 4.6–13.2)

## 2022-05-24 PROCEDURE — 74011250637 HC RX REV CODE- 250/637: Performed by: PHYSICIAN ASSISTANT

## 2022-05-24 PROCEDURE — 74011250637 HC RX REV CODE- 250/637: Performed by: STUDENT IN AN ORGANIZED HEALTH CARE EDUCATION/TRAINING PROGRAM

## 2022-05-24 PROCEDURE — 85025 COMPLETE CBC W/AUTO DIFF WBC: CPT

## 2022-05-24 PROCEDURE — 74011250636 HC RX REV CODE- 250/636

## 2022-05-24 PROCEDURE — 97116 GAIT TRAINING THERAPY: CPT

## 2022-05-24 PROCEDURE — 36415 COLL VENOUS BLD VENIPUNCTURE: CPT

## 2022-05-24 PROCEDURE — 82533 TOTAL CORTISOL: CPT

## 2022-05-24 PROCEDURE — 82024 ASSAY OF ACTH: CPT

## 2022-05-24 PROCEDURE — 97110 THERAPEUTIC EXERCISES: CPT

## 2022-05-24 PROCEDURE — 80053 COMPREHEN METABOLIC PANEL: CPT

## 2022-05-24 PROCEDURE — 97530 THERAPEUTIC ACTIVITIES: CPT

## 2022-05-24 RX ORDER — POLYETHYLENE GLYCOL 3350 17 G/17G
17 POWDER, FOR SOLUTION ORAL DAILY
Qty: 30 PACKET | Refills: 0 | Status: SHIPPED | OUTPATIENT
Start: 2022-05-24 | End: 2022-05-24

## 2022-05-24 RX ORDER — MIDODRINE HYDROCHLORIDE 2.5 MG/1
2.5 TABLET ORAL 2 TIMES DAILY WITH MEALS
Qty: 30 TABLET | Refills: 0 | Status: SHIPPED | OUTPATIENT
Start: 2022-05-24 | End: 2022-06-08

## 2022-05-24 RX ORDER — AMOXICILLIN 250 MG
1 CAPSULE ORAL DAILY
Qty: 30 TABLET | Refills: 0 | Status: SHIPPED | OUTPATIENT
Start: 2022-05-24 | End: 2022-05-24

## 2022-05-24 RX ADMIN — HYDROCODONE BITARTRATE AND ACETAMINOPHEN 1 TABLET: 7.5; 325 TABLET ORAL at 13:33

## 2022-05-24 RX ADMIN — PANTOPRAZOLE SODIUM 40 MG: 40 TABLET, DELAYED RELEASE ORAL at 16:34

## 2022-05-24 RX ADMIN — MIDODRINE HYDROCHLORIDE 2.5 MG: 2.5 TABLET ORAL at 16:34

## 2022-05-24 RX ADMIN — HYDROCODONE BITARTRATE AND ACETAMINOPHEN 1 TABLET: 7.5; 325 TABLET ORAL at 08:36

## 2022-05-24 RX ADMIN — LEVOTHYROXINE SODIUM 50 MCG: 0.05 TABLET ORAL at 05:55

## 2022-05-24 RX ADMIN — MIDODRINE HYDROCHLORIDE 2.5 MG: 2.5 TABLET ORAL at 08:37

## 2022-05-24 RX ADMIN — COSYNTROPIN 0.25 MG: 0.25 INJECTION, POWDER, LYOPHILIZED, FOR SOLUTION INTRAMUSCULAR; INTRAVENOUS at 05:57

## 2022-05-24 RX ADMIN — HYDROCODONE BITARTRATE AND ACETAMINOPHEN 1 TABLET: 7.5; 325 TABLET ORAL at 03:33

## 2022-05-24 RX ADMIN — PANTOPRAZOLE SODIUM 40 MG: 40 TABLET, DELAYED RELEASE ORAL at 08:37

## 2022-05-24 NOTE — PROGRESS NOTES
Intern Progress Note  [facility] [facility]       Patient: Nicole Walter MRN: [id number]  CSN: [id number]    YOB: 1945  Age: 68 y.o. Sex: female    DOA: 5/20/2022 LOS:  LOS: 4 days                    Subjective:    POD#4    Acute events: BP stable overnight-still on midodrine 2.5mg tid. Hgb improved to 9.2 yesterday, now 8.2. FOBT neg. ACTH stim tests labs drawn. Pt states she is tired this morning. Pt tells me she had a BM yesterday, and it was not black. Has been taking improved PO and fluids. Pt notes pain of R shoulder with movement, also increased swelling and bruising of RUE. Describes it as sore, worse in the upper arm than shoulder surgical site. Ortho PA at bedside, informs me swelling and bruising is normal after surgery. Objective:     Patient Vitals for the past 24 hrs:   Temp Pulse Resp BP SpO2   05/24/22 0323 98 °F (36.7 °C) 75 16 113/73 95 %   05/23/22 2000 98.4 °F (36.9 °C) 82 16 117/65 93 %   05/23/22 1451 98.1 °F (36.7 °C) 84 17 104/64 95 %   05/23/22 1030 97.9 °F (36.6 °C) 74 19 (!) 100/58 96 %   05/23/22 0849 98.6 °F (37 °C) 78 16 115/78 98 %         Intake/Output Summary (Last 24 hours) at 5/24/2022 0707  Last data filed at 5/23/2022 1354  Gross per 24 hour   Intake 300 ml   Output    Net 300 ml       Physical Exam:   General: well-appearing, NAD  HEENT: Conjunctiva pink, sclera anicteric. EOMI   CV:  RRR, no M/G/R  RESP: Unlabored breathing. Lungs CTAB, no wheezes, rales or rhonchi appreciated. Equal expansion bilaterally. ABD:  Soft, nontender, nondistended. MS:  R shoulder in soft brace; R shoulder surgical scar with honeycomb dressing C/D/I. Limited ROM 2/2 pain. Increased bruising of RUE, and swelling of R hand. Neuro:  5/5 strength bilateral lower extremities. A+Ox3. Ext:  2+ radial and dp pulses bilaterally. Skin: Warm & dry. No rashes, lesions, or ulcers. Good turgor. Psych: Normal mood and affect.      Lab/Data Reviewed:  Recent Results (from the past 24 hour(s))   CBC WITH AUTOMATED DIFF    Collection Time: 05/23/22  1:04 PM   Result Value Ref Range    WBC 7.7 4.6 - 13.2 K/uL    RBC 2.79 (L) 4.20 - 5.30 M/uL    HGB 8.9 (L) 12.0 - 16.0 g/dL    HCT 26.7 (L) 35.0 - 45.0 %    MCV 95.7 78.0 - 100.0 FL    MCH 31.9 24.0 - 34.0 PG    MCHC 33.3 31.0 - 37.0 g/dL    RDW 13.5 11.6 - 14.5 %    PLATELET 961 955 - 384 K/uL    MPV 9.8 9.2 - 11.8 FL    NRBC 0.0 0  WBC    ABSOLUTE NRBC 0.00 0.00 - 0.01 K/uL    NEUTROPHILS 67 40 - 73 %    LYMPHOCYTES 20 (L) 21 - 52 %    MONOCYTES 9 3 - 10 %    EOSINOPHILS 4 0 - 5 %    BASOPHILS 0 0 - 2 %    IMMATURE GRANULOCYTES 0 0.0 - 0.5 %    ABS. NEUTROPHILS 5.1 1.8 - 8.0 K/UL    ABS. LYMPHOCYTES 1.6 0.9 - 3.6 K/UL    ABS. MONOCYTES 0.7 0.05 - 1.2 K/UL    ABS. EOSINOPHILS 0.3 0.0 - 0.4 K/UL    ABS. BASOPHILS 0.0 0.0 - 0.1 K/UL    ABS. IMM. GRANS. 0.0 0.00 - 0.04 K/UL    DF AUTOMATED     CBC WITH AUTOMATED DIFF    Collection Time: 05/23/22  9:06 PM   Result Value Ref Range    WBC 9.2 4.6 - 13.2 K/uL    RBC 2.96 (L) 4.20 - 5.30 M/uL    HGB 9.2 (L) 12.0 - 16.0 g/dL    HCT 27.8 (L) 35.0 - 45.0 %    MCV 93.9 78.0 - 100.0 FL    MCH 31.1 24.0 - 34.0 PG    MCHC 33.1 31.0 - 37.0 g/dL    RDW 13.4 11.6 - 14.5 %    PLATELET 761 553 - 249 K/uL    MPV 9.8 9.2 - 11.8 FL    NRBC 0.0 0  WBC    ABSOLUTE NRBC 0.00 0.00 - 0.01 K/uL    NEUTROPHILS 71 40 - 73 %    LYMPHOCYTES 18 (L) 21 - 52 %    MONOCYTES 9 3 - 10 %    EOSINOPHILS 3 0 - 5 %    BASOPHILS 0 0 - 2 %    IMMATURE GRANULOCYTES 0 0.0 - 0.5 %    ABS. NEUTROPHILS 6.5 1.8 - 8.0 K/UL    ABS. LYMPHOCYTES 1.6 0.9 - 3.6 K/UL    ABS. MONOCYTES 0.8 0.05 - 1.2 K/UL    ABS. EOSINOPHILS 0.3 0.0 - 0.4 K/UL    ABS. BASOPHILS 0.0 0.0 - 0.1 K/UL    ABS. IMM.  GRANS. 0.0 0.00 - 0.04 K/UL    DF AUTOMATED     METABOLIC PANEL, COMPREHENSIVE    Collection Time: 05/24/22  4:30 AM   Result Value Ref Range    Sodium 139 136 - 145 mmol/L    Potassium 4.4 3.5 - 5.5 mmol/L    Chloride 105 100 - 111 mmol/L    CO2 31 21 - 32 mmol/L    Anion gap 3 3.0 - 18 mmol/L    Glucose 99 74 - 99 mg/dL    BUN 13 7.0 - 18 MG/DL    Creatinine 0.63 0.6 - 1.3 MG/DL    BUN/Creatinine ratio 21 (H) 12 - 20      GFR est AA >60 >60 ml/min/1.73m2    GFR est non-AA >60 >60 ml/min/1.73m2    Calcium 8.7 8.5 - 10.1 MG/DL    Bilirubin, total 0.6 0.2 - 1.0 MG/DL    ALT (SGPT) 7 (L) 13 - 56 U/L    AST (SGOT) 12 10 - 38 U/L    Alk. phosphatase 51 45 - 117 U/L    Protein, total 5.1 (L) 6.4 - 8.2 g/dL    Albumin 2.5 (L) 3.4 - 5.0 g/dL    Globulin 2.6 2.0 - 4.0 g/dL    A-G Ratio 1.0 0.8 - 1.7     CBC WITH AUTOMATED DIFF    Collection Time: 05/24/22  4:30 AM   Result Value Ref Range    WBC 6.3 4.6 - 13.2 K/uL    RBC 2.64 (L) 4.20 - 5.30 M/uL    HGB 8.2 (L) 12.0 - 16.0 g/dL    HCT 25.0 (L) 35.0 - 45.0 %    MCV 94.7 78.0 - 100.0 FL    MCH 31.1 24.0 - 34.0 PG    MCHC 32.8 31.0 - 37.0 g/dL    RDW 13.3 11.6 - 14.5 %    PLATELET 167 099 - 811 K/uL    MPV 9.7 9.2 - 11.8 FL    NRBC 0.0 0  WBC    ABSOLUTE NRBC 0.00 0.00 - 0.01 K/uL    NEUTROPHILS 56 40 - 73 %    LYMPHOCYTES 30 21 - 52 %    MONOCYTES 10 3 - 10 %    EOSINOPHILS 4 0 - 5 %    BASOPHILS 1 0 - 2 %    IMMATURE GRANULOCYTES 1 (H) 0.0 - 0.5 %    ABS. NEUTROPHILS 3.5 1.8 - 8.0 K/UL    ABS. LYMPHOCYTES 1.9 0.9 - 3.6 K/UL    ABS. MONOCYTES 0.6 0.05 - 1.2 K/UL    ABS. EOSINOPHILS 0.2 0.0 - 0.4 K/UL    ABS. BASOPHILS 0.0 0.0 - 0.1 K/UL    ABS. IMM. GRANS. 0.0 0.00 - 0.04 K/UL    DF AUTOMATED         Assessment & Plan:     Christina Da Silva is a 68 y.o. female w/ a PMH of hypothyroid, GERD and R humeral fx who is admitted for reverse total arthroplasty was consulted for hypotension. Admitting team attempted fluid resuscitation with 500cc of NS and 1L of LR which raised bp from 80s/50s to 90s/50s. MAPs have remained above 65. Tachycardia resolved with the boluses. Of note, pt's Hgb dropped from 13.5 to 7.9 in a 10 day span, which points to volume depletion.   She also has been taking norco 7.5mg, but unlikely cause of acute hypotension granted low dose opioid. Anemia 2/2 blood loss  DDX: likely hypovolemic  EGD  shows hiatal hernia, stricture and esophagitis. Hx of hypotension with morphine many years ago. On nexium (Esomeprazole) outpatient, now on protonix 40 inpatient  Hx of melena at time of birth of son 2/2 gastric ulcer, multiple life stressors including . Pt notes 16lbs wt loss in the past 2 months, combination of intentional and non-intentional  Hgb 13.5 () > 7.9 () > 7.4 > 7.2 > 1U > 8.4 > 8.3 > 8.9 > 9.2 > 8.2. Iron panel-Fe low (20), TIBC low, iron % sat low, ferritin normal.  Hemolysis labs (LDH, bili, haptoglobin) wnl. Retic count normal. MCV normal.  FOBT neg    Plan  -CBC q8hr  -cont midodrine 2.5mg tid  -continue monitoring BP, s/s of bleeding  -SCD's for DVT ppx d/t low Hgb, and pt mobile    Closed fx of R proximal humerus  S/p complete R shoulder arthroplasty reversal on . Had a mechanical fall on  while visiting son in [place], slipped on hardwood floor while playing with grandchild. Pt was taking tramadol for pain prior to surgery, but ran out 2 weeks ago and has been taking Tylenol 500-1000mg 2-3x per day. Taking calcium and vitamin D for osteopenia hx, but informs me her last Dexa scan was normal.    Plan  -Norco 7.5q4hr PRN for pain, also dilaudid IV which pt has not needed thus far  -Gabapentin 300tid for pain added while inpt  -FU with  post-op  - Pt has rx for Norco at dc from Ortho  -PT/OT    Constipation -resolved  Last BM was Wednesday, 5 days ago. Has been taking tramadol for pain prior to sx, now on Norco.    Plan  -cont Senna, miralax  -Pt had BM yesterday that was not melanotic, FOBT neg    Hypothyroidism  Home levothyroxine 50 mcg, continued inpt. Chronic herpes outbreaks  On buttocks. Takes acyclovir 200mg bid at home as needed.     Plan  -pt does not wish for us to schedule it while inpt    Arthritis  Hip, cervical and lumbar spine. Takes NSAIDs at home. Seasonal allergies   Takes Flonase in spring. GERD  Home nexium.     *Fully covid vax x3       Diet Regular   DVT Prophylaxis SCD's   GI Prophylaxis Protonix   Code status FULL   Disposition Home     Point of Contact DIANA  Relationship: Son  [phone]      Stella Tapia MD , PGY-1   [facility]   May 24, 2022, 05/24/22

## 2022-05-24 NOTE — PROGRESS NOTES
Problem: Self Care Deficits Care Plan (Adult)  Goal: *Acute Goals and Plan of Care (Insert Text)  Description: Occupational Therapy Goals  Initiated 5/21/2022 within 7 day(s). 1.  Patient will perform RUE AROM for elbow/wrist/hand with modified independence for 8 min in preparation for functional use for ADLs. 2.  Patient will participate in RUE shoulder PROM (no ER) for 8 min with <2 rest breaks in preparation for functional use for ADLs . 3. Patient will perform self-feeding and grooming with supervision/set-up. 4.  Patient will perform toilet transfers with supervision/set-up. 5.  Patient will perform all aspects of toileting with supervision/set-up. 6.  Patient will perform upper body dressing with supervision. 7.  Patient will perform lower body dressing with supervision. 8.  Patient will utilize energy conservation techniques during functional activities with verbal cues. Prior Level of Function: Pt reports being Mod Ind for ADLs and functional mobility. Her son and brother will be staying with her to assist prn. Outcome: Progressing Towards Goal   OCCUPATIONAL THERAPY TREATMENT    Patient: Robbie Payne (89 y.o. female)  Date: 5/24/2022  Diagnosis: Closed fracture of head of right humerus, initial encounter [S42.291A]  Closed fracture of right proximal humerus [S42.201A] Closed fracture of right proximal humerus  Procedure(s) (LRB):  RIGHT SHOULDER REVERSE TOTAL SHOULDER ARTHROPLASTY/ARTHREX/NERVE BLOCK/2 SA'S (Right) 4 Days Post-Op  Precautions: NWB (RUE)  PLOF: Pt reports being Mod Ind for ADLs and functional mobility. Her son and brother will be staying with her to assist prn. Chart, occupational therapy assessment, plan of care, and goals were reviewed. ASSESSMENT:  Pt presented supine in bed w/ HOB elevated upon therapist arrival. Pt reports she just got back into bed w/ ice for R UE. Pt agreeable to work with OT @ bed level.  She engaged in R UE Therex, retrograde massage and elevation to minimize edema. Pt left w/all needs within reach in bed w/ ice applied on R shoulder, no issues noted. RN made aware of pt's participation and position. Progression toward goals:  []          Improving appropriately and progressing toward goals  [x]          Improving slowly and progressing toward goals  []          Not making progress toward goals and plan of care will be adjusted     PLAN:  Patient continues to benefit from skilled intervention to address the above impairments. Continue treatment per established plan of care. Discharge Recommendations:  Outpatient  Further Equipment Recommendations for Discharge:  Straight Cane     SUBJECTIVE:   Patient stated  I should  be leaving today. \"     OBJECTIVE DATA SUMMARY:   Cognitive/Behavioral Status:  Neurologic State: Alert  Orientation Level: Oriented X4  Cognition: Follows commands  Safety/Judgement: Awareness of environment,Fall prevention      Balance:  Sitting: Intact    Therapeutic Exercise:  AROM R wrist flexion/extension x 10  R elbow extension x 3  Radial/ulnar deviation x 10  Palmar squeezes x 10  PROM on R shoulder continues to tolerate ~ 30 degrees x 5 requiring extended rest breaks in between reps. Pain:  Pain level pre-treatment: 6/10   Pain level post-treatment: 6/10  Pain Intervention(s): Medication (see MAR); Rest, Ice, Repositioning  Response to intervention: Nurse notified, See doc flow    Activity Tolerance:    Fair   Please refer to the flowsheet for vital signs taken during this treatment. After treatment:   []  Patient left in no apparent distress sitting up in chair  [x]  Patient left in no apparent distress in bed  [x]  Call bell left within reach  [x]  Nursing notified  []  Caregiver present  [x]  Bed alarm activated    COMMUNICATION/EDUCATION:   [x] Role of Occupational Therapy in the acute care setting  [x] Home safety education was provided and the patient/caregiver indicated understanding.   [x] Patient/family have participated as able in working towards goals and plan of care. [x] Patient/family agree to work toward stated goals and plan of care. [] Patient understands intent and goals of therapy, but is neutral about his/her participation. [] Patient is unable to participate in goal setting and plan of care.       Thank you for this referral.  EJAN-CLAUDE Yin  Time Calculation: 23 mins

## 2022-05-24 NOTE — PROGRESS NOTES
Patient has 1 prescription in  [facility] outpatient pharmacy when DC is decided.

## 2022-05-24 NOTE — DISCHARGE SUMMARY
Discharge Summary  [address] [facility]      Patient: Hortensia Markham MRN: [id number]  CSN: [id number]    YOB: 1945  Age: 68 y.o. Sex: female      Admission Date: 5/20/2022 Discharge Date: 05/24/22     Attending: Odin Ponce MD PCP: Keith To MD       Reason for Admission:   Closed fracture of head of right humerus, initial encounter [S42.291A]  Closed fracture of right proximal humerus [S42.201A]    Discharge Diagnoses:   Anemia 2/2 blood loss  Closed fx of R prox humerus s/p R shoulder arthroplasty  Constipation  Hypothyroidism  Chronic herpes outbreak  Arthritis  Seasonal allergies  GERD    Important notes to PCP/ follow-up studies and evaluations   Check BP (low while inpt requiring midodrine, 1.5L IVF)  --> dc midodrine when BP appropriate  Trend Hgb (received 1U pRBC while inpt for acute anemia)  Need GI ref for new anemia, 16lb wt loss over past 2 mo. FU constipation (required double miralax, Senna-docusate while inpt, on opiods)  Pain control (Norco 7.5 from ortho)    Pending labs and studies:  ACTH stimulation test: Cortisol levels (sent to [facility])    Operative Procedures:   R shoulder arthroplasty reversal on 5/20    Discharge Medications:     Current Discharge Medication List      START taking these medications    Details   midodrine (PROAMATINE) 2.5 mg tablet Take 1 Tablet by mouth two (2) times daily (with meals) for 30 doses. Qty: 30 Tablet, Refills: 0  Start date: 5/24/2022, End date: 6/8/2022         CONTINUE these medications which have NOT CHANGED    Details   Calcium-Cholecalciferol, D3, (Calcium 600 with Vitamin D3) 600 mg-10 mcg (400 unit) chew Take  by mouth.      carboxymethylcellulose sodium (REFRESH LIQUIGEL) 1 % dlgl ophthalmic solution Administer 1 Drop to both eyes as needed. esomeprazole (NexIUM) 20 mg capsule Take 20 mg by mouth daily.       fluticasone (FLONASE) 50 mcg/actuation nasal spray 1 [place] by Both Nostrils route daily. Indications: inflammation of the nose due to an allergy      levothyroxine (SYNTHROID) 50 mcg tablet Take 50 mcg by mouth Daily (before breakfast). acyclovir (ZOVIRAX) 200 mg capsule Take  by mouth daily. Indications: PREVENT RECURRENCE OF HERPES         STOP taking these medications       HYDROcodone-acetaminophen (Norco) 7.5-325 mg per tablet Comments:   Reason for Stopping:               Disposition: Home    Consultants:    [address] [place] [address] Course (including pertinent history and physical findings)  Jose Edmonds a 68 y. o. female w/ a PMH of hypothyroid, GERD and R humeral fx who was admitted for reverse total arthroplasty. PFM was consulted for hypotension. Admitting team attempted fluid resuscitation with 1.5L IVF with improvement in BP from 80s/50s to 90s/50s. MAPs remained above 65 and tachycardia resolved. Of note, pt's Hgb dropped from 13.5 to 7.9 in a 10 day span, which points to volume depletion.  She also has been taking norco 7.5mg, but unlikely cause of acute hypotension granted low dose opioid.      Anemia 2/2 blood loss  EGD  shows hiatal hernia, stricture and esophagitis. Hx of hypotension with morphine many years ago. On nexium (Esomeprazole) outpatient and cont. On protonix while inpt. Pt endorses hx of melena around the time of birth of her son 2/2 gastric ulcer, multiple life stressors including . Pt notes 16lbs wt loss in the past 2 months, combination of intentional and non-intentional. FOBT neg. Pt was started on midodrine 2.5mg tid, and it was continued at discharge. BP to be re-evaluated by PCP and can then be discontinued. No obvious source of bleeding found during admission. For reference:  Hgb 13.5 ( pre-op) > 7.9 ( post-op) >  7.2 > 1U pRBC > 8.4 > 8.2. Iron panel-Fe low (20), TIBC low, iron % sat low, ferritin normal.  Hemolysis labs (LDH, bili, haptoglobin) wnl.   Retic count normal. MCV normal.      Closed fx of R proximal humerus  S/p complete R shoulder arthroplasty reversal on 5/20. Had a mechanical fall on March 19 while visiting son in [place], slipped on hardwood floor while playing with grandchild. Pt was taking tramadol for pain prior to surgery, but ran out 2 weeks ago and has been taking Tylenol 500-1000mg 2-3x per day. Taking calcium and vitamin D for osteopenia hx, but per pt her last Dexa scan was normal. Pt was prescribed Norco 7.5q4hr PRN for pain by ortho, in addition to gabapentin 300 qhs while inpatient. Pt to FU with  outpt. Gabapentin discontinued at [place], pt already has rx for [place] from ortho. Pt also has PT setup.     Constipation -resolved  Pt did not have a BM since 2 days prior to admission. Notes decreased appetite. Has been taking tramadol for pain prior to sx, now on Norco. Pt was started on double dose of miralax and senna-docusate, and had a BM day prior to discharge, non-melanotic and FOBT neg. Pt does have rx for stool softner from ortho.     Hypothyroidism  Home levothyroxine 50 mcg, continued inpt.     Chronic herpes outbreaks  On buttocks. Takes acyclovir 200mg bid at home as needed. Pt did not wish for us to schedule it while inpt.     Arthritis  Hip, cervical and lumbar spine. Takes NSAIDs at home.     Seasonal allergies   Takes Flonase in spring.     GERD  Home nexium.     *Fully covid vax x3        CURRENT ADMISSION IMAGING RESULTS   XR CHEST PA LAT    Result Date: 5/12/2022   1. Pleural parenchymal opacity at the left lung base, probably scarring or atelectasis. No discrete pleural effusion on lateral view. 2. Hyperinflation, suggestive of COPD. 3. Suboptimally evaluated fracture deformity at the right proximal humerus.          Cardiology Procedures/Testing:  MODALITY RESULTS   EKG Results for orders placed or performed during the hospital encounter of 05/11/22   EKG, 12 LEAD, INITIAL   Result Value Ref Range    Ventricular Rate 86 BPM    Atrial Rate 86 BPM    P-R Interval 142 ms QRS Duration 74 ms    Q-T Interval 366 ms    QTC Calculation (Bezet) 437 ms    Calculated P Axis 24 degrees    Calculated R Axis -11 degrees    Calculated T Axis 27 degrees    Diagnosis       Normal sinus rhythm  Low voltage QRS  Nonspecific ST and T wave abnormality , anterior leads  Abnormal ECG  No previous ECGs available  Confirmed by Dennis Hastings ([id number]) on 5/11/2022 7:28:51 PM           Laboratory Results:  LABORATORY RESULTS   HEMATOLOGY Lab Results   Component Value Date/Time    WBC 6.6 05/24/2022 01:19 PM    HGB 8.6 (L) 05/24/2022 01:19 PM    HCT 25.6 (L) 05/24/2022 01:19 PM    PLATELET 637 35/27/4709 01:19 PM    MCV 93.4 05/24/2022 01:19 PM       CHEMISTRIES Lab Results   Component Value Date/Time    Sodium 139 05/24/2022 04:30 AM    Potassium 4.4 05/24/2022 04:30 AM    Chloride 105 05/24/2022 04:30 AM    CO2 31 05/24/2022 04:30 AM    Anion gap 3 05/24/2022 04:30 AM    Glucose 99 05/24/2022 04:30 AM    BUN 13 05/24/2022 04:30 AM    Creatinine 0.63 05/24/2022 04:30 AM    BUN/Creatinine ratio 21 (H) 05/24/2022 04:30 AM    GFR est AA >60 05/24/2022 04:30 AM    GFR est non-AA >60 05/24/2022 04:30 AM    Calcium 8.7 05/24/2022 04:30 AM      HEPATIC FUNCTION Lab Results   Component Value Date/Time    Albumin 2.5 (L) 05/24/2022 04:30 AM    Bilirubin, total 0.6 05/24/2022 04:30 AM    Protein, total 5.1 (L) 05/24/2022 04:30 AM    Globulin 2.6 05/24/2022 04:30 AM    A-G Ratio 1.0 05/24/2022 04:30 AM    ALT (SGPT) 7 (L) 05/24/2022 04:30 AM    Alk. phosphatase 51 05/24/2022 04:30 AM         Functional status and cognitive function:    Ambulates with: independently  Status: alert, cooperative, no distress, appears stated age  Condition: STABLE    Physical exam on day of discharge:     General: well-appearing, NAD  HEENT: Conjunctiva pink, sclera anicteric. EOMI   CV:  RRR, no M/G/R  RESP: Unlabored breathing. Lungs CTAB, no wheezes, rales or rhonchi appreciated. Equal expansion bilaterally.    ABD:  Soft, nontender, nondistended. MS:  R shoulder in soft brace; R shoulder surgical scar with honeycomb dressing C/D/I. Limited ROM 2/2 pain. Increased bruising of RUE, and swelling of R hand. Neuro:  5/5 strength bilateral lower extremities. A+Ox3. Ext:  2+ radial and dp pulses bilaterally. Skin: Warm & dry. No rashes, lesions, or ulcers. Good turgor. Psych: Normal mood and affect.     Code status and advanced care plan: Full    Point of Contact Silvestrezoraida Duarte  Relationship: Son  [phone]     Patient Education:  Patient was educated on the following topics prior to discharge:Hypotension    RISK CALCULATORS:  SCORE RESULT   READMISSION RISK SCORE Low Risk            5 Total Score    5 Pt. Coverage (Medicare=5 , Medicaid, or Self-Pay=4)        Criteria that do not apply:    Has Seen PCP in Last 6 Months (Yes=3, No=0)    . Living with Significant Other. Assisted Living. LTAC. SNF.  or   Rehab    Patient Length of Stay (>5 days = 3)    IP Visits Last 12 Months (1-3=4, 4=9, >4=11)    Charlson Comorbidity Score (Age + Comorbid Conditions)         Follow-up:   Follow-up Information     Follow up With Specialties Details Why Contact Info    [facility]  Call  [address] [address]  [address] [facility] [facility]  [phone]    Dixon Ames MD Family Medicine Go to Previously scheduled appt at 8:20am at [facility] with [address] [address]      Nubia Bravo P[address]ARACEL[address] [place] [address]            Katty Méndez MD, PGY-1   [facility]   May 24, 2022, 05/24/22

## 2022-05-24 NOTE — PROGRESS NOTES
D/C order noted for today. Orders reviewed. No needs identified at this time. Family member will transport home. Patient declined home health services. CM remains available if needed.       Munir Tripathi, BSN, RN  Care Management  Pager # [phone]

## 2022-05-24 NOTE — PROGRESS NOTES
Problem: Mobility Impaired (Adult and Pediatric)  Goal: *Acute Goals and Plan of Care (Insert Text)  Description: Physical Therapy Goals  Initiated 5/20/2022 and to be accomplished within 7 day(s)  1. Patient will move from supine to sit and sit to supine , scoot up and down, and roll side to side in bed with modified independence. 2.  Patient will transfer from bed to chair and chair to bed with modified independence using the least restrictive device. 3.  Patient will perform sit to stand with modified independence. 4.  Patient will ambulate with modified independence for 150 feet with the least restrictive device. 5.  Patient will maintain R RTSA precautions as prescribed. PLOF: pt lives alone in a senior apt with elevator access, ind PTA, no DME, son and brother providing support following surgery      Outcome: Resolved/Met     PHYSICAL THERAPY TREATMENT AND DISCHARGE    Patient: Hanna Neely (86 y.o. female)  Date: 5/24/2022  Diagnosis: Closed fracture of head of right humerus, initial encounter [S42.291A]  Closed fracture of right proximal humerus [S42.201A] Closed fracture of right proximal humerus  Procedure(s) (LRB):  RIGHT SHOULDER REVERSE TOTAL SHOULDER ARTHROPLASTY/ARTHREX/NERVE BLOCK/2 SA'S (Right) 4 Days Post-Op  Precautions: NWB (RUE)  ASSESSMENT:  Seated in recliner. Educated on safety with furniture walking v. cane. Supervision for sit to stand. Amb 25ft with no AD and supervision; reaching for furniture. Amb 175ft with straight cane in LUE; mod I.  Returned to seated in recliner. Denies further mobility concerns with discharge to home. Call bell in reach. PLAN:  Further skilled physical therapy is not indicated at this time.   Rationale for discharge:  [x]     Goals Achieved  []     [address]  []     Patient not participating in therapy  []     Other:  Discharge Recommendations:  Outpatient  Further Equipment Recommendations for Discharge:  straight cane; educated on purchase from [facility]     SUBJECTIVE:   Patient stated I'm too clumsy.     OBJECTIVE DATA SUMMARY:   Critical Behavior:  Neurologic State: Alert  Orientation Level: Oriented X4  Cognition: Follows commands  Safety/Judgement: Awareness of environment,Fall prevention  Psychosocial  Patient Behaviors: Cooperative    Functional Mobility Training:  Transfers:  Sit to Stand: Supervision  Stand to Sit: Supervision  Balance:   Sitting: Intact  Standing: Impaired  Standing - Static: Good  Standing - Dynamic : Good  Ambulation/Gait Training:  Distance (ft): 200 Feet (ft)   Assistive Device: Cane, straight  Ambulation - Level of Assistance: Supervision;Modified independent  Pain:  Pain level pre-treatment: 7/10   Pain level post-treatment: 7/10     Activity Tolerance:   Good    After treatment:   [x] Patient left in no apparent distress sitting up in chair  [] Patient left in no apparent distress in bed  [x] Call bell left within reach  [x] Nursing notified  [] Caregiver present  [] Bed alarm activated  [] SCDs applied      COMMUNICATION/EDUCATION:   [x]         Role of physical therapy in the acute care setting. [x]         Fall prevention education was provided and the patient/caregiver indicated understanding. [x]         Patient/family have participated as able and agree with findings and recommendations. []         Patient is unable to participate in plan of care at this time.        Forrest Quintanilla, PT   Time Calculation: 17 mins

## 2022-05-24 NOTE — PROGRESS NOTES
[facility]    Progress Note      Patient: Chari Joseph               Sex: female          DOA: 5/20/2022         YOB: 1945      Age:  68 y.o.        LOS:  LOS: 4 days         S/P  Procedure(s):  RIGHT SHOULDER REVERSE TOTAL SHOULDER ARTHROPLASTY/ARTHREX/NERVE BLOCK/2 SA'S               Subjective: Moderated pain but doing well. More bruising today  Denies cp, sob, abd pain   Objective:      Blood pressure 113/73, pulse 75, temperature 98 °F (36.7 °C), resp. rate 16, height 5' 2\" (1.575 m), weight 132 lb (59.9 kg), SpO2 95 %.    Well developed, Well Nourished alert, cooperative, no distress  Incision clean, dry, no drainage, dressing in place  swelling and tenderness noted in right upper extremity - normal for post op  Sensory is intact   Motor is intact  nv intact  2+distal pulses  Neg calf tenderness    Labs:  CBC  @  CBC:   Lab Results   Component Value Date/Time    WBC 6.3 05/24/2022 04:30 AM    RBC 2.64 (L) 05/24/2022 04:30 AM    HGB 8.2 (L) 05/24/2022 04:30 AM    HCT 25.0 (L) 05/24/2022 04:30 AM    PLATELET 713 07/53/0914 04:30 AM     BMP:   Lab Results   Component Value Date/Time    Glucose 99 05/24/2022 04:30 AM    Sodium 139 05/24/2022 04:30 AM    Potassium 4.4 05/24/2022 04:30 AM    Chloride 105 05/24/2022 04:30 AM    CO2 31 05/24/2022 04:30 AM    BUN 13 05/24/2022 04:30 AM    Creatinine 0.63 05/24/2022 04:30 AM    Calcium 8.7 05/24/2022 04:30 AM   @  Coagulation  Lab Results   Component Value Date    INR 0.98 05/11/2022    APTT 27 05/11/2022      Basic Metabolic Profile  Lab Results   Component Value Date     05/24/2022    CO2 31 05/24/2022    BUN 13 05/24/2022       Medications Reviewed      Assessment/Plan     Principal Problem:    Closed fracture of right proximal humerus (5/20/2022)    Active Problems:    Hypotension (5/22/2022)        Procedure(s):  RIGHT SHOULDER REVERSE TOTAL SHOULDER ARTHROPLASTY/ARTHREX/NERVE BLOCK/2 SA'S :     1. PT and/or OT Consults: YES continue PT/OT: oob to chair, gentle rom                                                 2. Incision Care:  Routine Incision Care and Dressing Changes as necessary: dressing changes POD#2 and as needed  3. Pain control  4. oob to chair and SCD for DVT prophylaxis  5. Acute blood loss anemia - no evidence of surgical bleeding. H&H stable s/p 1 unit transfusion  6. Hypotension - secondary to acute blood loss anemia and low PO intake    OK for discharge to home    4.  DISCHARGE PLANNING     Intervention : [id number] [facility]  [phone]

## 2022-05-25 ENCOUNTER — HOSPITAL ENCOUNTER (OUTPATIENT)
Dept: PHYSICAL THERAPY | Age: 77
Discharge: HOME | End: 2022-05-25
Payer: MEDICARE

## 2022-05-25 LAB — ACTH PLAS-MCNC: 6 PG/ML (ref 7.2–63.3)

## 2022-05-25 PROCEDURE — 97162 PT EVAL MOD COMPLEX 30 MIN: CPT

## 2022-05-25 NOTE — PROGRESS NOTES
PT DAILY TREATMENT NOTE     Patient Name: Nicole Walter  Date:2022  : 1945  [x]  Patient  Verified  Payor: Jonatan Lemus / Plan: [place] [employer] / Product Type: Managed Care Medicare /    In time:11:20  Out time:11:58  Total Treatment Time (min): 38  Visit #: 1 of 10    Medicare/BCBS Only   Total Timed Codes (min):  23 1:1 Treatment Time:  38       Treatment Area: Pain in right shoulder [M25.511]  Closed fracture of head of right humerus, initial encounter [S42.291A]    SUBJECTIVE  Pain Level (0-10 scale): 9/10  Any medication changes, allergies to medications, adverse drug reactions, diagnosis change, or new procedure performed?: [x] No    [] Yes (see summary sheet for update)  Subjective functional status/changes:   [] No changes reported    Chief Complaint: Right shoulder pain  History/Mechanism of Injury: 3/19/22, Pt slipped on the kitchen floor and fell. Pt suffered slant fracture of neck of humerus that healed incorrectly. To correct, refracture, reverse TSA had to be performed on 22. After surgery, blood pressure dropped, so had to stay hospitalized until blood transfusion performed along with IVs and medicines, brought numbers up to WNL and Pt able to released from hospital on 22. Current Symptoms/Deficits: pain, limited mobility in right shoulder, able to bathe, dress with modified independence  Pain-  Current: 9/10     Worst: 10/10   Best: 5/10  Previous Treatment/Compliance: meds  PMHx/Surgical Hx: left shoulder fracture 3/19/22  Work Hx: N/A  Living Situation: [place] in high-rise building alone with friendly neighbors to help; insurance will help with meals and transportation to [employer]: walking, stained glass projects  Pt Goals: \"Be able to drive, get back to normal, independent living. \"      OBJECTIVE    15 min [x]Eval                  []Re-Eval     23 min Therapeutic Activity:  []  See flow sheet : Patient education on therapy assessment, prognosis, expectations for therapy sessions, patient goals, post-op protocol, precautions, and HEP. Rationale: to improve the patients ability to adhere to HEP and therapy sessions for increased compliance when working toward therapy goals. With   [] TE   [x] TA   [] neuro   [] other: Patient Education: [x] Review HEP    [] Progressed/Changed HEP based on:   [] positioning   [] body mechanics   [] transfers   [] heat/ice application    [] other:      Other Objective/Functional Measures: FOTO 4 pts    Observation: swelling of right UE globally with red, yellow bruising along UE; red/purple bruising along left forearm; presence of sling for right UE  Palpation: TTP at along right UE to wrist    Shoulder AROM/PROM:                                           AROM (deg)              PROM (deg)   Right Left Right Left   Flexion  160 35    Extension  60 NT    Scaption  165 38    ER at 45 Deg ABD  80 0    IR at 45 Deg ABD  70     Seated Flexion    --------------- ---------------     Functional ER:    Functional IR:     C/S Screening: WNL but sore     Strength:   Right (/5) Left (/5)   GHJ   Flexion  4             Abduction  4             Extension  5             ER  5             IR  5   Upper Trapezius      Middle/Lower Trap     Elbow Flexion  5   Elbow Extension  5    Strength       Joint Feel: empty end feel with pain  Scapulohumeral Rhythm: hypomobile    Reflexes/Sensation: intact to light touch    Special Tests :      Right Left   Arabella-Sandip       Cross Arm     Speed's     Painful Arc     Sulcus     Apprehension     -   Right Left   Sulcus     Apprehension     Load + Shift     -   Right Left   ER Lag     Drop Arm     Empty Can     Belly Press     -   Right Left   Labral Shear       Yulan's     Biceps Load        -      Pain Level (0-10 scale) post treatment: 9/10    ASSESSMENT/Changes in Function: See POC    Patient will continue to benefit from skilled PT services to modify and progress therapeutic interventions, address functional mobility deficits, address ROM deficits, address strength deficits, analyze and address soft tissue restrictions, analyze and cue movement patterns, analyze and modify body mechanics/ergonomics, assess and modify postural abnormalities, address imbalance/dizziness and instruct in home and community integration to attain remaining goals.      [x]  See Plan of Care  []  See progress note/recertification  []  See Discharge Summary         Progress towards goals / Updated goals:  See POC    PLAN  [x]  Upgrade activities as tolerated     []  Continue plan of care  [x]  Update interventions per flow sheet       []  Discharge due to:_  []  Other:_      Rosemarie Andre, JOHAN 5/25/2022  11:01 AM    Future Appointments   Date Time Provider Diana Josephine   5/25/2022 11:15 AM Rosemarie Andre, JOHAN [facility]

## 2022-06-01 ENCOUNTER — APPOINTMENT (OUTPATIENT)
Dept: PHYSICAL THERAPY | Age: 77
End: 2022-06-01
Payer: MEDICARE

## 2022-06-03 ENCOUNTER — OFFICE VISIT (OUTPATIENT)
Dept: ORTHOPEDIC SURGERY | Age: 77
End: 2022-06-03
Payer: MEDICARE

## 2022-06-03 VITALS — HEIGHT: 62 IN | WEIGHT: 132 LBS | BODY MASS INDEX: 24.29 KG/M2 | TEMPERATURE: 97.6 F

## 2022-06-03 DIAGNOSIS — S42.291A CLOSED FRACTURE OF HEAD OF RIGHT HUMERUS, INITIAL ENCOUNTER: Primary | ICD-10-CM

## 2022-06-03 PROCEDURE — 99024 POSTOP FOLLOW-UP VISIT: CPT | Performed by: PHYSICIAN ASSISTANT

## 2022-06-03 PROCEDURE — 73030 X-RAY EXAM OF SHOULDER: CPT | Performed by: PHYSICIAN ASSISTANT

## 2022-06-03 NOTE — PATIENT INSTRUCTIONS
You may now shower and get your incisions wet. We recommend starting scar massage now to your incision(s). Take Vitamin E, Cocoa Butter or Scar Cream and massage the incision 2 times a day. This will help soften your incisions and help de-sensitize the skin as the nerves \"wake up\". You are to wear your sling whenever you are up and about, being active, or outside of your home. You may remove your sling when you are sedentary. You may remove your abduction pillow if this is more comfortable. We recommend that you try sleeping in your sling at night. However, if this is extremely intolerable you may remove your sling but prop your arm with some pillows. Remember, you are not to move your arm on your own. Only \"good arm helping the bad arm\". This includes \"chicken winging \" your arm out to the side. You may move your elbow and your wrist for activities such as typing, reading a book, etc.  Please remember that nothing including physical therapy should cause an increase in pain or soreness.

## 2022-06-03 NOTE — PROGRESS NOTES
Chari Joseph  1945     HISTORY OF PRESENT ILLNESS  Chari Joseph is a 68 y.o. female who presents today for evaluation s/p Right reverse total shoulder arthroplasty on 5/20/22. Patient has been going to PT. Describes pain as a 7/10. Has been taking minimal norco for pain. Still has night pain. Patient denies any fever, chills, chest pain, shortness of breath or calf pain. The remainder of the review of systems is negative. There are no new illness or injuries to report since last seen in the office. No changes in medications, allergies, social or family history. PHYSICAL EXAM:   Visit Vitals  Temp 97.6 °F (36.4 °C) (Temporal)   Ht 5' 2\" (1.575 m)   Wt 132 lb (59.9 kg)   BMI 24.14 kg/m²      The patient is a well-developed, well-nourished female in no acute distress. The patient is alert and oriented times three. The patient appears to be well groomed. Mood and affect are normal.  ORTHOPEDIC EXAM of right shoulder:  Inspection: swelling present,  Bruising present  Incision, clean, dry, intact, sutures in place  Passive glenohumeral abduction 0-35 degrees  Stability: Stable  Strength: n/a  2+ distal pulses    IMAGING: 3 view xray images of Right shoulder on 6/3/2022 read and reviewed by myself reveal reverse total shoulder arthroplasty in position. Fracture fragment in place. IMPRESSION:  S/P Right reverse total shoulder arthroplasty    PLAN:   Incisions cleaned. Surgery was discussed at length today. Patient to continue with PROM and PT. Continue wearing sling. Stressed to patient that nothing causes an increase in pain.   RTC 3 weeks for repeat xray    [facility]

## 2022-06-06 ENCOUNTER — HOSPITAL ENCOUNTER (OUTPATIENT)
Dept: PHYSICAL THERAPY | Age: 77
Discharge: HOME | End: 2022-06-06
Payer: MEDICARE

## 2022-06-06 PROCEDURE — 97110 THERAPEUTIC EXERCISES: CPT

## 2022-06-06 NOTE — PROGRESS NOTES
PT DAILY TREATMENT NOTE     Patient Name: Deanne Duong  Date:2022  : 1945  [x]  Patient  Verified  Payor: Joellen Daniel / Plan: [employer] / Product Type: Managed Care Medicare /    In time: 1:35   Out time:2:14  Total Treatment Time (min): 39  Visit #: 2 of 10    Medicare/BCBS Only   Total Timed Codes (min):  39 1:1 Treatment Time:  39       Treatment Area: Pain in right shoulder [M25.511]  Closed fracture of head of right humerus, initial encounter [S42.291A]    SUBJECTIVE  Pain Level (0-10 scale): 4  Any medication changes, allergies to medications, adverse drug reactions, diagnosis change, or new procedure performed?: [x] No    [] Yes (see summary sheet for update)  Subjective functional status/changes:   [] No changes reported  Pt reports compliance with HEP. OBJECTIVE    39 min Therapeutic Exercise:  [x] See flow sheet : exercises, PROM right shoulder flex/ABD and elbow flex/EXT to tolerance with gentle distraction of the right GHJ   Rationale: increase ROM and increase strength to improve the patients ability to tolerate ADLs          With   [x] TE   [] TA   [] neuro   [] other: Patient Education: [x] Review HEP    [] Progressed/Changed HEP based on:   [] positioning   [] body mechanics   [] transfers   [] heat/ice application    [] other:      Other Objective/Functional Measures: Initiated exercises/interventions per flow sheet. PROM right shoulder ~60 degs flex per visual observation. Empty end feel noted with PROM right shoulder ABD/Flex. Pain Level (0-10 scale) post treatment: 4    ASSESSMENT/Changes in Function: Reported no change in pain post session today. Scar seems to healing well with no signs of infection or DVT and educated pt on signs/symptoms of a DVT/infection. Bruising noted on the right forearm. Guarding noted with PROM of the right shoulder at times. Educated pt on holding scar massage until the incision heals more.  Continue POC as tolerated per protocol to improve pain and PROM. Patient will continue to benefit from skilled PT services to modify and progress therapeutic interventions, address functional mobility deficits, address ROM deficits, address strength deficits, analyze and address soft tissue restrictions, analyze and cue movement patterns, analyze and modify body mechanics/ergonomics, assess and modify postural abnormalities, address imbalance/dizziness and instruct in home and community integration to attain remaining goals. []  See Plan of Care  []  See progress note/recertification  []  See Discharge Summary         Progress towards goals / Updated goals:  Short Term Goals: To be accomplished in 1 weeks:  Goal: Pt to be compliant with initial HEP to improve cervical, right shoulder, elbow, wrist mobility  Status at last note/certification: Established and reviewed with Pt  MET, reports compliance 6/6/2022  Long Term Goals: To be accomplished in 5 weeks:  Goal: Pt to increase right shoulder ROM in Flex, ABD to at least 90 deg without increased pain to progress towards AAROM activities. Status at last note/certification: PROM in supine - Flex 35 deg, ABD 38 deg  PROM ~60 degs flex per visual observation 6/6/2022  Goal: Pt to increase right shoulder ER ROM in scapular plane to 45 deg without increased pain for ease of reaching, ADLs when cleared by MD.  Status at last note/certification: supine ER in scapular plane - 0 deg PROM - protocol restriction  Goal: Pt to report no difficulty with washing same side of face for ease of bathing, grooming tasks. Status at last note/certification: unable to perform  Goal: Pt to report < 5/10 pain at worst to increase ease with ADLs. Status at last note/certification: 51/02 pain at worst  Goal: Pt to report FOTO score of 52 pts to show improved function and quality of life.   Status at last note/certification: FOTO 4 pts     PLAN  [x]  Upgrade activities as tolerated     [x]  Continue plan of care  [x]  Update interventions per flow sheet       []  Discharge due to:_  []  Other:_      Dorota Blackman, PT 6/6/2022  1:42 PM    Future Appointments   Date Time Provider Diana Burton   6/9/2022 12:45 PM Dwaine Gonzalez, PT [facility]   6/13/2022  1:30 PM Felicia Fontanez [facility]   6/16/2022  1:30 PM Annie Saldana, PT [facility]   6/20/2022  1:30 PM Felicia Fontanez [facility]   6/23/2022  1:30 PM Henny Davis, PT [facility]   6/24/2022  1:15 PM MAICO Schafer VS [facility] AM[facility]   6/27/2022  1:30 PM Feliica Fontanez [facility]   6/30/2022  1:30 PM Henny Davis, PT [facility]

## 2022-06-09 ENCOUNTER — HOSPITAL ENCOUNTER (OUTPATIENT)
Dept: PHYSICAL THERAPY | Age: 77
Discharge: HOME | End: 2022-06-09
Payer: MEDICARE

## 2022-06-09 PROCEDURE — 97110 THERAPEUTIC EXERCISES: CPT

## 2022-06-09 NOTE — PROGRESS NOTES
PT DAILY TREATMENT NOTE     Patient Name: Laurie Robertson  Date:2022  : 1945  [x]  Patient  Verified  Payor: Thi Edmonds / Plan: [employer] / Product Type: Managed Care Medicare /    In time: 12:48  Out time: 1:23  Total Treatment Time (min): 35  Visit #: 3 of 10    Medicare/BCBS Only   Total Timed Codes (min):  35 1:1 Treatment Time:  35       Treatment Area: Pain in right shoulder [M25.511]  Closed fracture of head of right humerus, initial encounter [S42.291A]    SUBJECTIVE  Pain Level (0-10 scale): 6/10  Any medication changes, allergies to medications, adverse drug reactions, diagnosis change, or new procedure performed?: [x] No    [] Yes (see summary sheet for update)  Subjective functional status/changes:   [] No changes reported  Patient stated PT was gentle last time and pain was no worse after.      OBJECTIVE    Modality rationale: Patient declined    Min Type Additional Details    [] Estim:  []Unatt       []IFC  []Premod                        []Other:  []w/ice   []w/heat  Position:  Location:    [] Estim: []Att    []TENS instruct  []NMES                    []Other:  []w/US   []w/ice   []w/heat  Position:  Location:    []  Traction: [] Cervical       []Lumbar                       [] Prone          []Supine                       []Intermittent   []Continuous Lbs:  [] before manual  [] after manual    []  Ultrasound: []Continuous   [] Pulsed                           []1MHz   []3MHz W/cm2:  Location:    []  Iontophoresis with dexamethasone         Location: [] Take home patch   [] In clinic    []  Ice     []  heat  []  Ice massage  []  Laser   []  Anodyne Position:  Location:    []  Laser with stim  []  Other:  Position:  Location:    []  Vasopneumatic Device    []  Right     []  Left  Pre-treatment girth:  Post-treatment girth:  Measured at (location):  Pressure:       [] lo [] med [] hi   Temperature: [] lo [] med [] hi   [] Skin assessment post-treatment: []intact []redness- no adverse reaction    []redness - adverse reaction:     35 min Therapeutic Exercise:  [x] See flow sheet : Gentle PROM by therapist into shoulder flexion and Elbow Flexion/extension. Maintained within protocol restrictions and patient's tolerance. No overpressure applied. Cervical rotation AROM- 5xeach    Rationale: increase ROM to improve the patients ability to perform ADls safely. With   [] TE   [] TA   [] neuro   [] other: Patient Education: [x] Review HEP    [] Progressed/Changed HEP based on:   [] positioning   [] body mechanics   [] transfers   [] heat/ice application    [] other:      Other Objective/Functional Measures: decreased ROM into right elbow extension noted. Pain Level (0-10 scale) post treatment: 5/10     ASSESSMENT/Changes in Function: Therapist provided cues for correct technique and muscle activation with exercises. Advised patient to notify therapist if anything causes increased pain. Had patient start all exercises at 5 reps, and if no discomfort went to 10x. Patient did well with the exercises. Patient will continue to benefit from skilled PT services to modify and progress therapeutic interventions, address functional mobility deficits, address ROM deficits, address strength deficits, analyze and address soft tissue restrictions, analyze and cue movement patterns, analyze and modify body mechanics/ergonomics and assess and modify postural abnormalities to attain remaining goals.      []  See Plan of Care  []  See progress note/recertification  []  See Discharge Summary         Progress towards goals / Updated goals:  Short Term Goals: To be accomplished in 1 weeks:  Goal: Pt to be compliant with initial HEP to improve cervical, right shoulder, elbow, wrist mobility  Status at last note/certification: Established and reviewed with Pt  MET, reports compliance 6/6/2022  Long Term Goals: To be accomplished in 5 weeks:  Goal: Pt to increase right shoulder ROM in Flex, ABD to at least 90 deg without increased pain to progress towards AAROM activities. Status at last note/certification: PROM in supine - Flex 35 deg, ABD 38 deg  PROM ~60 degs flex per visual observation 6/6/2022  Goal: Pt to increase right shoulder ER ROM in scapular plane to 45 deg without increased pain for ease of reaching, ADLs when cleared by MD.  Status at last note/certification: supine ER in scapular plane - 0 deg PROM - protocol restriction  Goal: Pt to report no difficulty with washing same side of face for ease of bathing, grooming tasks. Status at last note/certification: unable to perform  Goal: Pt to report < 5/10 pain at worst to increase ease with ADLs. Status at last note/certification: 10/10 pain at worst  Goal: Pt to report FOTO score of 52 pts to show improved function and quality of life.   Status at last note/certification: GPKV 9 FMA     PLAN  []  Upgrade activities as tolerated     [x]  Continue plan of care  []  Update interventions per flow sheet       []  Discharge due to:_  []  Other:_      Minerva Garcia, PT 6/9/2022  12:45 PM    Future Appointments   Date Time Provider Diana Burton   6/13/2022  1:30 PM Betty Ramos [facility]   6/16/2022  1:30 PM Ebenezer Jimenez [facility]   6/20/2022  1:30 PM [facility]   6/23/2022  1:30 PM Caprice Almaraz [facility]   6/24/2022  1:15 PM MAICO Landeros [facility] BS AMB   6/27/2022  1:30 PM [place], [address]   6/30/2022  1:30 PM Caprice Almaraz, [facility]

## 2022-06-13 ENCOUNTER — APPOINTMENT (OUTPATIENT)
Dept: PHYSICAL THERAPY | Age: 77
End: 2022-06-13
Payer: MEDICARE

## 2022-06-16 ENCOUNTER — APPOINTMENT (OUTPATIENT)
Dept: PHYSICAL THERAPY | Age: 77
End: 2022-06-16
Payer: MEDICARE

## 2022-06-20 ENCOUNTER — HOSPITAL ENCOUNTER (OUTPATIENT)
Dept: PHYSICAL THERAPY | Age: 77
Discharge: HOME | End: 2022-06-20
Payer: MEDICARE

## 2022-06-20 PROCEDURE — 97140 MANUAL THERAPY 1/> REGIONS: CPT

## 2022-06-20 PROCEDURE — 97110 THERAPEUTIC EXERCISES: CPT

## 2022-06-20 NOTE — PROGRESS NOTES
PT DAILY TREATMENT NOTE     Patient Name: Fredo Redding  Date:2022  : 1945  [x]  Patient  Verified  Payor: Josue Show / Plan: [place] [employer]AGE / Product Type: Managed Care Medicare /    In time:1:30  Out time:2:15  Total Treatment Time (min): 45  Visit #: 4 of 10    Medicare/BCBS Only   Total Timed Codes (min):  45 1:1 Treatment Time:  45       Treatment Area: Pain in right shoulder [M25.511]  Closed fracture of head of right humerus, initial encounter [S42.291A]    SUBJECTIVE  Pain Level (0-10 scale): 4  Any medication changes, allergies to medications, adverse drug reactions, diagnosis change, or new procedure performed?: [x] No    [] Yes (see summary sheet for update)  Subjective functional status/changes:   [] No changes reported  My elbow and biceps get sore from the sling    OBJECTIVE      33 min Therapeutic Exercise:  [] See flow sheet :   Rationale: increase ROM and increase strength to improve the patients ability to improve mobity  Within limits of protocol, perform ADl's      12 min Manual Therapy:  Supine: gentle STM to deltoid, biceps, PROM to increase shoulder flexion. Sidelying scapular mobs with STM to rhomboids, thoracic paraspinals, UT/mid trap   The manual therapy interventions were performed at a separate and distinct time from the therapeutic activities interventions. Rationale: decrease pain, increase ROM and increase tissue extensibility to improve shoulder/scapula mobility            With   [] TE   [] TA   [] neuro   [] other: Patient Education: [x] Review HEP    [] Progressed/Changed HEP based on:   [] positioning   [] body mechanics   [] transfers   [] heat/ice application    [] other:      Other Objective/Functional Measures: ex's per card     Pain Level (0-10 scale) post treatment: 2    ASSESSMENT/Changes in Function: Pt seen today for ROM and pain reduction s/p TSR.   Pt reporting pain level 4/10, tender along anterior aspect of shoulder, scar and in pecs. Pt compliant with EP and sling wear as per protocol. Patient will continue to benefit from skilled PT services to modify and progress therapeutic interventions, address functional mobility deficits, address ROM deficits, address strength deficits, analyze and address soft tissue restrictions, analyze and cue movement patterns, analyze and modify body mechanics/ergonomics, assess and modify postural abnormalities, address imbalance/dizziness and instruct in home and community integration to attain remaining goals. []  See Plan of Care  []  See progress note/recertification  []  See Discharge Summary         Progress towards goals / Updated goals:  Goal: Pt to be compliant with initial HEP to improve cervical, right shoulder, elbow, wrist mobility  Status at last note/certification: Established and reviewed with Pt  MET, reports compliance 6/6/2022  Long Term Goals: To be accomplished in 5 weeks:  Goal: Pt to increase right shoulder ROM in Flex, ABD to at least 90 deg without increased pain to progress towards AAROM activities. Status at last note/certification: PROM in supine - Flex 35 deg, ABD 38 deg  PROM ~60 degs flex per visual observation 6/6/2022  Goal: Pt to increase right shoulder ER ROM in scapular plane to 45 deg without increased pain for ease of reaching, ADLs when cleared by MD.  Status at last note/certification: supine ER in scapular plane - 0 deg PROM - protocol restriction  Goal: Pt to report no difficulty with washing same side of face for ease of bathing, grooming tasks. Status at last note/certification: unable to perform  Goal: Pt to report < 5/10 pain at worst to increase ease with ADLs. Status at last note/certification: 10/10 pain at worst  Goal: Pt to report FOTO score of 52 pts to show improved function and quality of life.   Status at last note/certification: [facility]  []  Upgrade activities as tolerated     []  Continue plan of care  []  Update interventions per flow sheet       []  Discharge due to:_  []  Other:_      Ralph Kirk, PTA 6/20/2022  1:36 PM    Future Appointments   Date Time Provider Diana Burton   6/23/2022  1:30 PM Cammy Harris, [facility]   6/24/2022  1:15 PM MAICO Michael [facility]   6/27/2022  1:30 PM [place] [address] [place] [address]   6/30/2022  [facility]:30 [facility]

## 2022-06-23 ENCOUNTER — HOSPITAL ENCOUNTER (OUTPATIENT)
Dept: PHYSICAL THERAPY | Age: 77
Discharge: HOME | End: 2022-06-23
Payer: MEDICARE

## 2022-06-23 PROCEDURE — 97140 MANUAL THERAPY 1/> REGIONS: CPT

## 2022-06-23 PROCEDURE — 97110 THERAPEUTIC EXERCISES: CPT

## 2022-06-23 NOTE — PROGRESS NOTES
PT DAILY TREATMENT NOTE     Patient Name: Suresh Leyva  Date:2022  : 1945  [x]  Patient  Verified  Payor: Stacey Barnes / Plan: [place] [employer] MEDICARE ADVANTAGE / Product Type: Managed Care Medicare /    In time:130  Out time:215  Total Treatment Time (min): 45  Visit #: 5 of 10    Medicare/BCBS Only   Total Timed Codes (min):  45 1:1 Treatment Time:  45       Treatment Area: Pain in right shoulder [M25.511]  Closed fracture of head of right humerus, initial encounter [S42.291A]    SUBJECTIVE  Pain Level (0-10 scale): 4/10  Any medication changes, allergies to medications, adverse drug reactions, diagnosis change, or new procedure performed?: [x] No    [] Yes (see summary sheet for update)  Subjective functional status/changes:   [] No changes reported  Pt reports she feels she is progressing slowly     OBJECTIVE    30 min Therapeutic Exercise:  [] See flow sheet :   Rationale: increase ROM, increase strength, improve coordination and improve balance to improve the patients ability to tolerate functional mobility and daily routine    15 min Manual Therapy:  STM/TPR to right bicep, upper trap, levator scap region    The manual therapy interventions were performed at a separate and distinct time from the therapeutic activities interventions. Rationale: decrease pain, increase ROM, increase tissue extensibility, decrease trigger points and increase postural awareness to improve pt ability to tolerate functional mobility. With   [] TE   [] TA   [] neuro   [] other: Patient Education: [x] Review HEP    [] Progressed/Changed HEP based on:   [] positioning   [] body mechanics   [] transfers   [] heat/ice application    [] other:      Other Objective/Functional Measures: Reassessed goals. Pain Level (0-10 scale) post treatment: 5/10    ASSESSMENT/Changes in Function: Pt has attended skilled therapy 5 and has made good progress thus far.   Pt reports the following:    Functional Gains: I don't feel as stiff or tight, I can move my lower arm some more. Functional Deficits: soreness, sleeping, cooking and meal prep.  % improvement: 50%  Pain   Average: 5/10                  Best: 4/10                Worst: 6-7/10  Patient Goal: \"I want to get back to driving. \"  Pt would benefit from continued skilled therapy to address ROM, strength, postural correction and functional mobility. Patient will continue to benefit from skilled PT services to modify and progress therapeutic interventions, address functional mobility deficits, address ROM deficits, address strength deficits, analyze and address soft tissue restrictions, analyze and cue movement patterns, analyze and modify body mechanics/ergonomics, assess and modify postural abnormalities, address imbalance/dizziness and instruct in home and community integration to attain remaining goals. [x]  See Plan of Care  []  See progress note/recertification  []  See Discharge Summary         Progress towards goals / Updated goals:  Goal: Pt to be compliant with initial HEP to improve cervical, right shoulder, elbow, wrist mobility  Status at last note/certification: met: pt reports compliance with HEP   Goal: Pt to increase right shoulder ROM in Flex, ABD to at least 90 deg without increased pain to progress towards AAROM activities. Status at last note/certification: progressing. PROM: flex: 85 degrees, and abd/scaption: 45 degrees (6/23/22)   Goal: Pt to increase right shoulder ER ROM in scapular plane to 45 deg without increased pain for ease of reaching, ADLs when cleared by MD.  Status at last note/certification: not met Unable to assess secondary to protocol, restriction (6/23/22)   Goal: Pt to report no difficulty with washing same side of face for ease of bathing, grooming tasks. Status at last note/certification: Not met.  Pt unable to perform hygiene with right side at this time per surgical precautions. (6/23/22)  Goal: Pt to report < 5/10 pain at worst to increase ease with ADLs. Status at last note/certification:  progressing; 6-7/10 pain at worst (6/23/22)  Goal: Pt to report FOTO score of 52 pts to show improved function and quality of life.   Status at last note/certification:Progressing: FOTO 30 pts (6/23/22)     PLAN  [x]  Upgrade activities as tolerated     [x]  Continue plan of care  []  Update interventions per flow sheet       []  Discharge due to:_  []  Other:_      Ben Garner, PT 6/23/2022  1:55 PM    Future Appointments   Date Time Provider Diana Burton   6/24/2022  1:15 PM Sherice Simmons [facility] AMB   6/27/2022  1:30 PM [facility]   6/30/2022  1:30 PM [facility]

## 2022-06-23 NOTE — PROGRESS NOTES
[facility] - [facility], [facility] [address]  [phone] fax    Continued Plan of Care/ Re-certification for Physical Therapy Services      Patient name: Brandy Moran Start of Care: 22   Referral source: Sherice Savage : 1945   Medical/Treatment Diagnosis: Pain in right shoulder [M25.511]  Closed fracture of head of right humerus, initial encounter [S42.990Y]  Payor: Kevin Benedict / Plan: [email or web address] / Product Type: Managed Care Medicare /  Onset Date:22     Prior Hospitalization: see medical history Provider#: [id number]   Medications: Verified on Patient Summary List    Comorbidities: Arthritis; Back pain; Headaches; Thyroid problems   Prior Level of Function: Retired; lives in studio apartment alone; manages home independently; walking program for exercise, completes stained glass projects    Visits from Start of Care: 5    Missed Visits: 0    The Plan of Care and following information is based on the patient's current status:  Goal: Pt to be compliant with initial HEP to improve cervical, right shoulder, elbow, wrist mobility  Status at last note/certification: Established and reviewed with Pt  Current: met: pt reports compliance with HEP   Long Term Goals: To be accomplished in 5 weeks:  Goal: Pt to increase right shoulder ROM in Flex, ABD to at least 90 deg without increased pain to progress towards AAROM activities. Status at last note/certification: PROM ~39 degs flex per visual observation 2022  Current: progressing.  PROM: flex: 85 degrees, and abd/scaption: 45 degrees (22)   Goal: Pt to increase right shoulder ER ROM in scapular plane to 45 deg without increased pain for ease of reaching, ADLs when cleared by MD.  Status at last note/certification: supine ER in scapular plane - 0 deg PROM - protocol restriction  Current: not met Unable to assess secondary to protocol, restriction (22) Goal: Pt to report no difficulty with washing same side of face for ease of bathing, grooming tasks. Status at last note/certification: unable to perform  Current: Not met. Pt unable to perform hygiene with right side at this time per surgical precautions. (6/23/22)  Goal: Pt to report < 5/10 pain at worst to increase ease with ADLs. Status at last note/certification: 10/10 pain at worst  Current: progressing; 6-7/10 pain at worst (6/23/22)  Goal: Pt to report FOTO score of 52 pts to show improved function and quality of life. Status at last note/certification: PASS 1 [facility]   Current: Progressing: FOTO 30 pts (6/23/22)     Problems/ barriers to goal attainment: limited secondary to surgical precautions. Problem List: pain affecting function, decrease ROM, decrease strength, edema affecting function, impaired gait/ balance, decrease ADL/ functional abilitiies, decrease activity tolerance, decrease flexibility/ joint mobility and decrease transfer abilities    Treatment Plan: Therapeutic exercise, Therapeutic activities, Neuromuscular re-education, Physical agent/modality, Gait/balance training, Manual therapy, Aquatic therapy, Patient education, Self Care training, Functional mobility training, Home safety training and Stair training     Patient Goal (s) has been updated and includes: \"I want to get back to driving. \"     Goals for this certification period to be accomplished in 5 weeks:  Goal: Pt to be compliant with initial HEP to improve cervical, right shoulder, elbow, wrist mobility  Status at last note/certification: met: pt reports compliance with HEP   Goal: Pt to increase right shoulder ROM in Flex, ABD to at least 90 deg without increased pain to progress towards AAROM activities. Status at last note/certification: progressing.  PROM: flex: 85 degrees, and abd/scaption: 45 degrees (6/23/22)   Goal: Pt to increase right shoulder ER ROM in scapular plane to 45 deg without increased pain for ease of reaching, Omelia Messing cleared by MD.  Status at last note/certification: not met Unable to assess secondary to protocol, restriction (6/23/22)   Goal: Pt to report no difficulty with washing same side of face for ease of bathing, grooming tasks. Status at last note/certification: Not met. Pt unable to perform hygiene with right side at this time per surgical precautions. (6/23/22)  Goal: Pt to report < 5/10 pain at worst to increase ease with ADLs. Status at last note/certification:  progressing; 6-7/10 pain at worst (6/23/22)  Goal: Pt to report FOTO score of 52 pts to show improved function and quality of life. Status at last note/certification:Progressing: FOTO 30 pts (6/23/22)    Frequency / Duration: Patient to be seen 2 times per week for 5 weeks:    Assessment / Recommendations: Pt has attended skilled therapy 5 and has made good progress thus far. Pt reports the following:    Functional Gains: I don't feel as stiff or tight, I can move my lower arm some more. Functional Deficits: soreness, sleeping, cooking and meal prep.  % improvement: 50%  Pain   Average: 5/10       Best: 4/10     Worst: 6-7/10  Patient Goal: \"I want to get back to driving. \"  Pt would benefit from continued skilled therapy to address ROM, strength, postural correction and functional mobility. .       Certification Period: 6/24/22-7/23/22    Keith Lopez, PT 6/23/2022 1:32 PM    ________________________________________________________________________  I certify that the above Therapy Services are being furnished while the patient is under my care. I agree with the treatment plan and certify that this therapy is necessary. [] I have read the above and request that my patient continue as recommended.   [] I have read the above report and request that my patient continue therapy with the following changes/special instructions: ______________________________________  [] I have read the above report and request that my patient be discharged from therapy    Physician's Signature:____________Date:_________TIME:________     MAICO Vaughn  ** Signature, Date and Time must be completed for valid certification **    Please sign and return to [facility] - [address]  [phone] fax

## 2022-06-24 ENCOUNTER — OFFICE VISIT (OUTPATIENT)
Dept: ORTHOPEDIC SURGERY | Age: 77
End: 2022-06-24
Payer: MEDICARE

## 2022-06-24 VITALS — BODY MASS INDEX: 24.14 KG/M2 | TEMPERATURE: 97.5 F | WEIGHT: 132 LBS

## 2022-06-24 DIAGNOSIS — S42.291A CLOSED FRACTURE OF HEAD OF RIGHT HUMERUS, INITIAL ENCOUNTER: Primary | ICD-10-CM

## 2022-06-24 PROCEDURE — 73030 X-RAY EXAM OF SHOULDER: CPT | Performed by: PHYSICIAN ASSISTANT

## 2022-06-24 PROCEDURE — 99024 POSTOP FOLLOW-UP VISIT: CPT | Performed by: PHYSICIAN ASSISTANT

## 2022-06-24 NOTE — PROGRESS NOTES
Grant Billy  1945     HISTORY OF PRESENT ILLNESS  Grant Billy is a 68 y.o. female who presents today for evaluation s/p Right reverse total shoulder arthroplasty on 5/20/22. Patient has been going to PT. Describes pain as a 3/10. Has been taking minimal norco for pain. Overall improving. Patient denies any fever, chills, chest pain, shortness of breath or calf pain. The remainder of the review of systems is negative. There are no new illness or injuries to report since last seen in the office. No changes in medications, allergies, social or family history. PHYSICAL EXAM:   Visit Vitals  Temp 97.5 °F (36.4 °C) (Temporal)   Wt 132 lb (59.9 kg)   BMI 24.14 kg/m²      The patient is a well-developed, well-nourished female in no acute distress. The patient is alert and oriented times three. The patient appears to be well groomed. Mood and affect are normal.  ORTHOPEDIC EXAM of right shoulder:  Inspection: swelling present,  Bruising not present  Incision well healed  Passive glenohumeral abduction 0-50 degrees  Stability: Stable  Strength: n/a  2+ distal pulses    IMAGING: 3 view xray images of Right shoulder on 6/24/2022 read and reviewed by myself reveal reverse total shoulder arthroplasty in position. Fracture fragment in place. no callus formation     IMPRESSION:  S/P Right reverse total shoulder arthroplasty    PLAN:   1. Patient improving post operatively. 2. Will d/c sling now. Cont with PT. [id number] Tamela Santos for active assist this week. arom in 1 week.      RTC 4 weeks for repeat xray    ERICA [facility] Specialist

## 2022-06-27 ENCOUNTER — HOSPITAL ENCOUNTER (OUTPATIENT)
Dept: PHYSICAL THERAPY | Age: 77
Discharge: HOME | End: 2022-06-27
Payer: MEDICARE

## 2022-06-27 PROCEDURE — 97110 THERAPEUTIC EXERCISES: CPT

## 2022-06-27 PROCEDURE — 97140 MANUAL THERAPY 1/> REGIONS: CPT

## 2022-06-27 NOTE — PROGRESS NOTES
PT DAILY TREATMENT NOTE     Patient Name: Max Segundo  Date:2022  : 1945  [x]  Patient  Verified  Payor: Alissa Pi / [facility] / Product Type: Managed Care Medicare /    In time:1:30  Out time:2:15  Total Treatment Time (min): 45  Visit #: 1 of 10    Medicare/BCBS Only   Total Timed Codes (min):  45 1:1 Treatment Time:  45       Treatment Area: Pain in right shoulder [M25.511]  Closed fracture of head of right humerus, initial encounter [S42.291A]    SUBJECTIVE  Pain Level (0-10 scale): 3-4  Any medication changes, allergies to medications, adverse drug reactions, diagnosis change, or new procedure performed?: [x] No    [] Yes (see summary sheet for update)  Subjective functional status/changes:   [] No changes reported  The shoulder isn't as sore    OBJECTIVE       20 min Therapeutic Exercise:  [] See flow sheet :   Rationale: increase ROM and increase strength to improve the patients ability to perform ADL's    25 min Manual Therapy:  Supine  STM to deltoid, biceps and pec major. Gentle manual stretching to increase flexion and scaption. SL scap mobs with STM to mid scap, rhomboids and RTC complex   The manual therapy interventions were performed at a separate and distinct time from the therapeutic activities interventions.   Rationale: decrease pain, increase ROM and increase tissue extensibility to improve shoulder mobility to prepare for AROM phase and ADL's          With   [] TE   [] TA   [] neuro   [] other: Patient Education: [x] Review HEP    [] Progressed/Changed HEP based on:   [] positioning   [] body mechanics   [] transfers   [] heat/ice application    [] other:      Other Objective/Functional Measures: ex's per card     Pain Level (0-10 scale) post treatment: 3    ASSESSMENT/Changes in Function: pt seen today for right shoulder pain for Pain in right shoulder [M25.511], Closed fracture of head of right humerus, initial encounter [J18.009Q] .  Pt reports decreasing ain in right hosulder along deltoid, but sll very tender along anterior aspect of shoulder. Patient will continue to benefit from skilled PT services to modify and progress therapeutic interventions, address functional mobility deficits, address ROM deficits, address strength deficits, analyze and address soft tissue restrictions, analyze and cue movement patterns, analyze and modify body mechanics/ergonomics, assess and modify postural abnormalities, address imbalance/dizziness and instruct in home and community integration to attain remaining goals. []  See Plan of Care  []  See progress note/recertification  []  See Discharge Summary         Progress towards goals / Updated goals:  Goal: Pt to be compliant with initial HEP to improve cervical, right shoulder, elbow, wrist mobility  Status at last note/certification: met: pt reports compliance with HEP   Goal: Pt to increase right shoulder ROM in Flex, ABD to at least 90 deg without increased pain to progress towards AAROM activities. Status at last note/certification: progressing. PROM: flex: 85 degrees, and abd/scaption: 45 degrees (6/23/22)   Goal: Pt to increase right shoulder ER ROM in scapular plane to 45 deg without increased pain for ease of reaching, ADLs when cleared by MD.  Status at last note/certification: not met Unable to assess secondary to protocol, restriction (6/23/22)   Goal: Pt to report no difficulty with washing same side of face for ease of bathing, grooming tasks. Status at last note/certification: Not met. Pt unable to perform hygiene with right side at this time per surgical precautions. (6/23/22)  Goal: Pt to report < 5/10 pain at worst to increase ease with ADLs. Status at last note/certification:  progressing; 6-7/10 pain at worst (6/23/22)  Goal: Pt to report FOTO score of 52 pts to show improved function and quality of life.   Status at last note/certification:Progressing: FOTO 30 pts (6/23/22)    PLAN  [x]  Upgrade activities as tolerated     []  Continue plan of care  []  Update interventions per flow sheet       []  Discharge due to:_  []  Other:_      Richa Ashley PTA 6/27/2022  1:36 PM    Future Appointments   Date Time Provider Diana Burton   6/30/2022  1:30 PM Dianna Estrella, PT [facility]   7[facility]5/2022  2:15 PM Barbara Lentzee [facility]   7/7/2022  3:00 PM Dianna Estrella PT [facility]   7/11/2022  1:30 PM Dary Ramon PT [facility]   7/22/2022  1:00 PM Barb Cain PA [facility] BS AMB

## 2022-06-30 ENCOUNTER — TELEPHONE (OUTPATIENT)
Dept: PHYSICAL THERAPY | Age: 77
End: 2022-06-30

## 2022-06-30 ENCOUNTER — HOSPITAL ENCOUNTER (OUTPATIENT)
Dept: PHYSICAL THERAPY | Age: 77
Discharge: HOME | End: 2022-06-30
Payer: MEDICARE

## 2022-06-30 PROCEDURE — 97110 THERAPEUTIC EXERCISES: CPT

## 2022-06-30 PROCEDURE — 97140 MANUAL THERAPY 1/> REGIONS: CPT

## 2022-06-30 NOTE — PROGRESS NOTES
PT DAILY TREATMENT NOTE     Patient Name: Stoney Shepard  Date:2022  : 1945  [x]  Patient  Verified  Payor: La Villagran / Plan: [employer] / Product Type: Managed Care Medicare /    In time:1335 pm  Out time:1413 pm  Total Treatment Time (min): 38  Visit #: 2 of 10    Medicare/BCBS Only   Total Timed Codes (min):  38 1:1 Treatment Time:  38       Treatment Area: Pain in right shoulder [M25.511]  Closed fracture of head of right humerus, initial encounter [S42.291A]    SUBJECTIVE  Pain Level (0-10 scale): 4  Any medication changes, allergies to medications, adverse drug reactions, diagnosis change, or new procedure performed?: [x] No    [] Yes (see summary sheet for update)  Subjective functional status/changes:   [] No changes reported  \"it's making progress\"    OBJECTIVE    13 min Therapeutic Exercise:  [x] See flow sheet :   Rationale: increase ROM and increase strength to improve the patients ability to perform ADL's    25 min Manual Therapy:  Right UE: Supine STM to deltoid, biceps and pec major. PROM right shoulder flexion. Side-Lying: STM to right parascapular musculature, teres, infraspinatus, upper trap/levator scap musculature    The manual therapy interventions were performed at a separate and distinct time from the therapeutic activities interventions.   Rationale: decrease pain, increase ROM and increase tissue extensibility to improve shoulder mobility to prepare for AROM phase and ADL's          With   [x] TE   [] TA   [] neuro   [] other: Patient Education: [x] Review HEP    [] Progressed/Changed HEP based on:   [] positioning   [] body mechanics   [] transfers   [] heat/ice application    [] other:      Other Objective/Functional Measures:   Pain at worst in the last week: 6/10  PROM shoulder flexion: 90 deg (visual approximation)    Pain Level (0-10 scale) post treatment: 3    ASSESSMENT/Changes in Function: Patient continuing with PT within surgical precautions. Able to go to ~90 degrees of right shoulder flexion PROM (visual inspection). Continues with TTP along right parascapular musculature with some reported relief post manual therapy techniques. Patient will continue to benefit from skilled PT services to modify and progress therapeutic interventions, address functional mobility deficits, address ROM deficits, address strength deficits, analyze and address soft tissue restrictions, analyze and cue movement patterns, analyze and modify body mechanics/ergonomics, assess and modify postural abnormalities, address imbalance/dizziness and instruct in home and community integration to attain remaining goals. []  See Plan of Care  []  See progress note/recertification  []  See Discharge Summary         Progress towards goals / Updated goals:  Goal: Pt to be compliant with initial HEP to improve cervical, right shoulder, elbow, wrist mobility  Status at last note/certification: met: pt reports compliance with HEP   Goal: Pt to increase right shoulder ROM in Flex, ABD to at least 90 deg without increased pain to progress towards AAROM activities. Status at last note/certification: progressing. PROM: flex: 85 degrees, and abd/scaption: 45 degrees (6/23/22)   Current: PROM shoulder flexion: 90 deg (visual approximation) (6/30/22) progressing, not met   Goal: Pt to increase right shoulder ER ROM in scapular plane to 45 deg without increased pain for ease of reaching, ADLs when cleared by MD.  Status at last note/certification: not met Unable to assess secondary to protocol, restriction (6/23/22)   Current:  Goal: Pt to report no difficulty with washing same side of face for ease of bathing, grooming tasks. Status at last note/certification: Not met. Pt unable to perform hygiene with right side at this time per surgical precautions. (6/23/22)  Current:  Goal: Pt to report < 5/10 pain at worst to increase ease with ADLs.   Status at last note/certification:  progressing; 6-7/10 pain at worst (6/23/22)  Current: Pain at worst in the last week: 6/10 (6/30/22) progressing   Goal: Pt to report FOTO score of 52 pts to show improved function and quality of life.   Status at last note/certification:Progressing: FOTO 30 pts (6/23/22)  Current:    PLAN  [x]  Upgrade activities as tolerated     [x]  Continue plan of care  []  Update interventions per flow sheet       []  Discharge due to:_  []  Other:_      Silvio Tripathi, PT 6/30/2022 2:28 PM     Future Appointments   Date Time Provider Diana Burton   7/7/2022  3:00 PM Judd Quintanilla, PT [facility]   7/11/2022  1:30 PM Rickie Cardona, PT [facility]   7/22/2022  1:00 PM Tess Cain PA [facility] AM[facility]

## 2022-07-05 ENCOUNTER — APPOINTMENT (OUTPATIENT)
Dept: PHYSICAL THERAPY | Age: 77
End: 2022-07-05
Payer: MEDICARE

## 2022-07-07 ENCOUNTER — HOSPITAL ENCOUNTER (OUTPATIENT)
Dept: PHYSICAL THERAPY | Age: 77
Discharge: HOME | End: 2022-07-07
Payer: MEDICARE

## 2022-07-07 PROCEDURE — 97140 MANUAL THERAPY 1/> REGIONS: CPT

## 2022-07-07 PROCEDURE — 97110 THERAPEUTIC EXERCISES: CPT

## 2022-07-07 NOTE — PROGRESS NOTES
PT DAILY TREATMENT NOTE     Patient Name: Brandon Gray  Date:2022  : 1945  [x]  Patient  Verified  Payor: Shane Hendricksxavier / Plan: [address] [place] [address] / Product Type: Managed Care Medicare /    In time:1506 pm  Out time:1546 pm  Total Treatment Time (min): 40  Visit #: 3 of 10    Medicare/BCBS Only   Total Timed Codes (min):  40 1:1 Treatment Time:  39       Treatment Area: Pain in right shoulder [M25.511]  Closed fracture of head of right humerus, initial encounter [S42.291A]    SUBJECTIVE  Pain Level (0-10 scale): 5  Any medication changes, allergies to medications, adverse drug reactions, diagnosis change, or new procedure performed?: [x] No    [] Yes (see summary sheet for update)  Subjective functional status/changes:   [] No changes reported  Pt reports continues with pain in Right UE - nerve pain in right elbow  As per patient she is wearing sling when she is outside of apartment/condo, doing dishes/getting something to eat     OBJECTIVE    32/31 (1:1) min Therapeutic Exercise:  [x] See flow sheet :   Rationale: increase ROM and increase strength to improve the patients ability to perform ADL's    8 min Manual Therapy:  Right UE: Supine STM to deltoid, biceps, triceps    The manual therapy interventions were performed at a separate and distinct time from the therapeutic activities interventions. Rationale: decrease pain, increase ROM and increase tissue extensibility to improve shoulder mobility to prepare for AROM phase and ADL's          With   [x] TE   [] TA   [] neuro   [] other: Patient Education: [x] Review HEP    [] Progressed/Changed HEP based on:   [] positioning   [] body mechanics   [] transfers   [] heat/ice application    [] other:      Other Objective/Functional Measures:   Miguel Campos today   Per MD note on 22: \"Will d/c sling now. Cont with PT. [id number] Tamela Santos for active assist this week. arom in 1 week. \"   AAROM Right shoulder flexion: 82 deg     Pain Level (0-10 scale) post treatment: 5    ASSESSMENT/Changes in Function: Pt currently 6 weeks post op for Right reverse TSA on 5/20/22. Pt commenced with AAROM right shoulder today - skilled verbal cues provided to avoid AROM of right. Pt was instructed in updated HEP for AAROM and shoulder isometrics. Pt was given hand out detailing exercises and instructed in modification of exercises to tolerance, and in performing exercises safely. Pt verbalized understanding. Patient required demonstration, skilled verbal cues, and skilled tactile cues for all exercises to perform correctly. Patient able to perform progressed session with no increased pain. Will plan to continue as per MD protocol for Right shoulder mobility. Patient will continue to benefit from skilled PT services to modify and progress therapeutic interventions, address functional mobility deficits, address ROM deficits, address strength deficits, analyze and address soft tissue restrictions, analyze and cue movement patterns, analyze and modify body mechanics/ergonomics, assess and modify postural abnormalities, address imbalance/dizziness and instruct in home and community integration to attain remaining goals. []  See Plan of Care  []  See progress note/recertification  []  See Discharge Summary         Progress towards goals / Updated goals:  Goal: Pt to be compliant with initial HEP to improve cervical, right shoulder, elbow, wrist mobility  Status at last note/certification: met: pt reports compliance with HEP   Goal: Pt to increase right shoulder ROM in Flex, ABD to at least 90 deg without increased pain to progress towards AAROM activities. Status at last note/certification: progressing.  PROM: flex: 85 degrees, and abd/scaption: 45 degrees (6/23/22)   Current: PROM shoulder flexion: 90 deg (visual approximation) (6/30/22) progressing; 7/7/22: AAROM Right shoulder flexion: 82 deg, progressing    Goal: Pt to increase right shoulder ER ROM in scapular plane to 45 deg without increased pain for ease of reaching, ADLs when cleared by MD.  Status at last note/certification: not met Unable to assess secondary to protocol, restriction (6/23/22)   Current:  Goal: Pt to report no difficulty with washing same side of face for ease of bathing, grooming tasks. Status at last note/certification: Not met. Pt unable to perform hygiene with right side at this time per surgical precautions. (6/23/22)  Current: Patient reports no difficulty - she is taking a shower- washing hair (7/7/22) met   Goal: Pt to report < 5/10 pain at worst to increase ease with ADLs. Status at last note/certification:  progressing; 6-7/10 pain at worst (6/23/22)  Current: Pain at worst in the last week: 6/10 (6/30/22) progressing   Goal: Pt to report FOTO score of 52 pts to show improved function and quality of life.   Status at last note/certification:Progressing: FOTO 30 pts (6/23/22)  Current:    PLAN  [x]  Upgrade activities as tolerated     [x]  Continue plan of care  []  Update interventions per flow sheet       []  Discharge due to:_  []  Other:_      Denise Hubbard, PT 7/7/2022 5:55 PM     Future Appointments   Date Time Provider Diana Burton   7/11/2022  1:30 PM Zach Christopher, PT [facility]   7/14/2022  1:30 PM Mian Salvador [facility]   7/18/2022  1:30 PM Maynor Andre, PT [facility]   7/21/2022  1:30 PM Mayra Ramirez, PT [facility]   7/22/2022  1:00 PM Carolyn Cain PA [facility] AMB

## 2022-07-11 ENCOUNTER — HOSPITAL ENCOUNTER (OUTPATIENT)
Dept: PHYSICAL THERAPY | Age: 77
Discharge: HOME | End: 2022-07-11
Payer: MEDICARE

## 2022-07-11 PROCEDURE — 97110 THERAPEUTIC EXERCISES: CPT

## 2022-07-11 PROCEDURE — 97140 MANUAL THERAPY 1/> REGIONS: CPT

## 2022-07-11 NOTE — QM NOTE
PT DAILY TREATMENT NOTE     Patient Name: Blanco Subramanian  Date:2022  : 1945  [x]  Patient  Verified  Payor: Xiao Sheltonmichelle / Plan: [place] / Product Type: Managed Care Medicare /    In time:1:35  Out time:2:20  Total Treatment Time (min): 45  Visit #: 4 of 10    Medicare/BCBS Only   Total Timed Codes (min):  45 1:1 Treatment Time:  45       Treatment Area: Pain in right shoulder [M25.511]  Closed fracture of head of right humerus, initial encounter [S42.291A]    SUBJECTIVE  Pain Level (0-10 scale): 4  Any medication changes, allergies to medications, adverse drug reactions, diagnosis change, or new procedure performed?: [x] No    [] Yes (see summary sheet for update)  Subjective functional status/changes:   [] No changes reported  I have soreness in other area today. But I've taken my sling off some this weekend. OBJECTIVE      30 min Therapeutic Exercise:  [] See flow sheet :   Rationale: increase ROM and increase strength to improve the patients ability to perform daily tasks, grooming    15 min Manual Therapy:  STM to right deltoid, biceps. Manual stretching with gentle long axis traction   The manual therapy interventions were performed at a separate and distinct time from the therapeutic activities interventions. Rationale: decrease pain, increase ROM and increase tissue extensibility to improve shoulder mobity and function          With   [] TE   [] TA   [] neuro   [] other: Patient Education: [x] Review HEP    [] Progressed/Changed HEP based on:   [] positioning   [] body mechanics   [] transfers   [] heat/ice application    [] other:      Other Objective/Functional Measures: ex's per card     Pain Level (0-10 scale) post treatment: 4    ASSESSMENT/Changes in Function: Pt seen today to address right shoulder pain s/p TSR. Pt reports soreness that is more diffuse now, which mat be attributed to effects of dependent position due to decreased use of sling. challenged with resisted isometrics, weakness. No c/o increased discomfort during session. Patient will continue to benefit from skilled PT services to modify and progress therapeutic interventions, address functional mobility deficits, address ROM deficits, address strength deficits, analyze and address soft tissue restrictions, analyze and cue movement patterns, analyze and modify body mechanics/ergonomics, assess and modify postural abnormalities, address imbalance/dizziness and instruct in home and community integration to attain remaining goals. []  See Plan of Care  []  See progress note/recertification  []  See Discharge Summary         Progress towards goals / Updated goals:  Goal: Pt to be compliant with initial HEP to improve cervical, right shoulder, elbow, wrist mobility  Status at last note/certification: met: pt reports compliance with HEP   Goal: Pt to increase right shoulder ROM in Flex, ABD to at least 90 deg without increased pain to progress towards AAROM activities. Status at last note/certification: progressing. PROM: flex: 85 degrees, and abd/scaption: 45 degrees (6/23/22)   Current: PROM shoulder flexion: 90 deg (visual approximation) (6/30/22) progressing; 7/7/22: AAROM Right shoulder flexion: 82 deg, progressing    Goal: Pt to increase right shoulder ER ROM in scapular plane to 45 deg without increased pain for ease of reaching, ADLs when cleared by MD.  Status at last note/certification: not met Unable to assess secondary to protocol, restriction (6/23/22)   Current:  Goal: Pt to report no difficulty with washing same side of face for ease of bathing, grooming tasks. Status at last note/certification: Not met. Pt unable to perform hygiene with right side at this time per surgical precautions. (6/23/22)  Current: Patient reports no difficulty - she is taking a shower- washing hair (7/7/22) met   Goal: Pt to report < 5/10 pain at worst to increase ease with ADLs.   Status at last note/certification:  progressing; 6-7/10 pain at worst (6/23/22)  Current: Pain at worst in the last week: 6/10 (6/30/22) progressing   Goal: Pt to report FOTO score of 52 pts to show improved function and quality of life.   Status at last note/certification:Progressing: FOTO 30 pts (6/23/22)  Current:    PLAN  [x]  Upgrade activities as tolerated     []  Continue plan of care  []  Update interventions per flow sheet       []  Discharge due to:_  []  Other:_      Burnell Angelucci, TEE 7/11/2022  1:42 PM    Future Appointments   Date Time Provider Diana Burton   7/14/2022  1:30 PM Gib Hills [facility]   7/18/2022  1:30 PM Beto Andre, PT [facility]   7/21/2022  1:30 PM Ronna Werner, PT [facility]   7/22/2022  1:00 PM Nubia Cain PA [facility] AM[facility]

## 2022-07-14 ENCOUNTER — HOSPITAL ENCOUNTER (OUTPATIENT)
Dept: PHYSICAL THERAPY | Age: 77
Discharge: HOME | End: 2022-07-14
Payer: MEDICARE

## 2022-07-14 PROCEDURE — 97110 THERAPEUTIC EXERCISES: CPT

## 2022-07-14 PROCEDURE — 97140 MANUAL THERAPY 1/> REGIONS: CPT

## 2022-07-14 NOTE — PROGRESS NOTES
PT DAILY TREATMENT NOTE     Patient Name: Austin Dorado  Date:2022  : 1945  [x]  Patient  Verified  Payor: Cathy Sessions / Plan: [address] [place] [address] / Product Type: Managed Care Medicare /    In time:1:30  Out time:2:15  Total Treatment Time (min): 45  Visit #: 5 of 10    Medicare/BCBS Only   Total Timed Codes (min):  45 1:1 Treatment Time:  45       Treatment Area: Pain in right shoulder [M25.511]  Closed fracture of head of right humerus, initial encounter [S42.291A]    SUBJECTIVE  Pain Level (0-10 scale): 4  Any medication changes, allergies to medications, adverse drug reactions, diagnosis change, or new procedure performed?: [x] No    [] Yes (see summary sheet for update)  Subjective functional status/changes:   [] No changes reported  I'm sore from the ex's last session. But I notice small improve    OBJECTIVE      30 min Therapeutic Exercise:  [] See flow sheet :   Rationale: increase ROM and increase strength to improve the patients ability to perform daily tasks, ADL's    15 min Manual Therapy:  Gentle manual stretching with oscillations. STM to deltoid, biceps. The manual therapy interventions were performed at a separate and distinct time from the therapeutic activities interventions. Rationale: decrease pain, increase ROM and increase tissue extensibility to improve shoulder mobility, ADL's         With   [] TE   [] TA   [] neuro   [] other: Patient Education: [x] Review HEP    [] Progressed/Changed HEP based on:   [] positioning   [] body mechanics   [] transfers   [] heat/ice application    [] other:      Other Objective/Functional Measures: ex's per card     Pain Level (0-10 scale) post treatment: 5    ASSESSMENT/Changes in Function: Pt sen today for right shoulder pain s/p TSR. Pain persists in deltoid and along scar. Scar is intact and healing as expected.  Assist required with supine flexion, difficulty initiating movement, moderate pain during lowering. PD ice at end of session, will apply at home. Patient will continue to benefit from skilled PT services to modify and progress therapeutic interventions, address functional mobility deficits, address ROM deficits, address strength deficits, analyze and address soft tissue restrictions, analyze and cue movement patterns, analyze and modify body mechanics/ergonomics, assess and modify postural abnormalities, address imbalance/dizziness and instruct in home and community integration to attain remaining goals. []  See Plan of Care  []  See progress note/recertification  []  See Discharge Summary         Progress towards goals / Updated goals:  Goal: Pt to be compliant with initial HEP to improve cervical, right shoulder, elbow, wrist mobility  Status at last note/certification: met: pt reports compliance with HEP   Goal: Pt to increase right shoulder ROM in Flex, ABD to at least 90 deg without increased pain to progress towards AAROM activities. Status at last note/certification: progressing. PROM: flex: 85 degrees, and abd/scaption: 45 degrees (6/23/22)   Current: PROM shoulder flexion: 90 deg (visual approximation) (6/30/22) progressing; 7/7/22: AAROM Right shoulder flexion: 82 deg, progressing    Goal: Pt to increase right shoulder ER ROM in scapular plane to 45 deg without increased pain for ease of reaching, ADLs when cleared by MD.  Status at last note/certification: not met Unable to assess secondary to protocol, restriction (6/23/22)   Current:  Goal: Pt to report no difficulty with washing same side of face for ease of bathing, grooming tasks. Status at last note/certification: Not met. Pt unable to perform hygiene with right side at this time per surgical precautions. (6/23/22)  Current: Patient reports no difficulty - she is taking a shower- washing hair (7/7/22) met   Goal: Pt to report < 5/10 pain at worst to increase ease with ADLs.   Status at last note/certification:  progressing; 6-7/10 pain at worst (6/23/22)  Current: Pain at worst in the last week: 6/10 (6/30/22) progressing   Goal: Pt to report FOTO score of 52 pts to show improved function and quality of life.   Status at last note/certification:Progressing: FOTO 30 pts (6/23/22)  Current: assess closer to MD note    PLAN  [x]  Upgrade activities as tolerated     []  Continue plan of care  []  Update interventions per flow sheet       []  Discharge due to:_  []  Other:_      Ian Weston, TEE 7/14/2022  1:37 PM    Future Appointments   Date Time Provider Diana Burton   7/18/2022  1:30 PM Ave Andre, PT [facility]   7/21/2022  1:30 PM Jesus Mendoza, PT [facility]   7/22/2022  1:00 PM Ernesto Cain PA [facility] AMB

## 2022-07-18 ENCOUNTER — HOSPITAL ENCOUNTER (OUTPATIENT)
Dept: PHYSICAL THERAPY | Age: 77
Discharge: HOME | End: 2022-07-18
Payer: MEDICARE

## 2022-07-18 PROCEDURE — 97140 MANUAL THERAPY 1/> REGIONS: CPT

## 2022-07-18 PROCEDURE — 97110 THERAPEUTIC EXERCISES: CPT

## 2022-07-18 NOTE — PROGRESS NOTES
PT DAILY TREATMENT NOTE     Patient Name: Duran Noguera  Date:2022  : 1945  [x]  Patient  Verified  Payor: Gwendolyn Chowdhury / Plan: [employer] / Product Type: Managed Care Medicare /     In time: 1:30  Out time:2:15  Total Treatment Time (min): 45  Visit #: 6 of 10    Medicare/BCBS Only   Total Timed Codes (min):  45 1:1 Treatment Time:  45       Treatment Area: Pain in right shoulder [M25.511]  Closed fracture of head of right humerus, initial encounter [S42.291A]    SUBJECTIVE  Pain Level (0-10 scale): 0  Any medication changes, allergies to medications, adverse drug reactions, diagnosis change, or new procedure performed?: [x] No    [] Yes (see summary sheet for update)  Subjective functional status/changes:   [] No changes reported  I'm not in pain, I feel like im getting better. OBJECTIVE    30 min Therapeutic Exercise:  [] See flow sheet :   Rationale: increase ROM and increase strength to improve the patients ability to reach overhead. 15 min Manual Therapy:  Passive ROM: abduction, flexion. STM to the right upper trapezius. The manual therapy interventions were performed at a separate and distinct time from the therapeutic activities interventions. Rationale: decrease pain, increase ROM, increase tissue extensibility and decrease trigger points to improve functional mobility. With   [x] TE   [x] TA   [] neuro   [] other: Patient Education: [x] Review HEP    [] Progressed/Changed HEP based on:   [] positioning   [] body mechanics   [] transfers   [] heat/ice application    [] other:      Other Objective/Functional Measures: exercises per chart. Pain Level (0-10 scale) post treatment: 4    ASSESSMENT/Changes in Function: Pt reports to skilled therapy session with decreased functional ROM and strength in the right shoulder/arm.  Pt was able to progress resistance with weight elbow flexion, to improve functional strength in the right upper extremity. Pt tolerated the addition of finger ladder flexion, to improve ease with reaching overhead. Verbal cues were provided to bring her shoulders back to limit impingement and decrease shoulder hike during exercises. Mild increases in pain were noted in the anterior deltoid during passive shoulder flexion/abduction but improved as session progressed. All post treatment modalities were declined. Patient will continue to benefit from skilled PT services to modify and progress therapeutic interventions, address functional mobility deficits, address ROM deficits, address strength deficits, analyze and address soft tissue restrictions, analyze and cue movement patterns, analyze and modify body mechanics/ergonomics and assess and modify postural abnormalities to attain remaining goals. [x]  See Plan of Care  []  See progress note/recertification  []  See Discharge Summary         Progress towards goals / Updated goals:  Goal: Pt to be compliant with initial HEP to improve cervical, right shoulder, elbow, wrist mobility  Status at last note/certification: met: pt reports compliance with HEP   Goal: Pt to increase right shoulder ROM in Flex, ABD to at least 90 deg without increased pain to progress towards AAROM activities. Status at last note/certification: progressing. PROM: flex: 85 degrees, and abd/scaption: 45 degrees (6/23/22)   Current: PROM shoulder flexion: 90 deg (visual approximation) (6/30/22) progressing; 7/7/22: AAROM Right shoulder flexion: 82 deg, progressing    Goal: Pt to increase right shoulder ER ROM in scapular plane to 45 deg without increased pain for ease of reaching, ADLs when cleared by MD.  Status at last note/certification: not met Unable to assess secondary to protocol, restriction (6/23/22)   Current:  Goal: Pt to report no difficulty with washing same side of face for ease of bathing, grooming tasks. Status at last note/certification: Not met.  Pt unable to perform hygiene with right side at this time per surgical precautions. (6/23/22)  Current: Patient reports no difficulty - she is taking a shower- washing hair (7/7/22) met   Goal: Pt to report < 5/10 pain at worst to increase ease with ADLs. Status at last note/certification:  progressing; 6-7/10 pain at worst (6/23/22)  Current: Pain at worst in the last week: 6/10 (6/30/22) progressing   Goal: Pt to report FOTO score of 52 pts to show improved function and quality of life.   Status at last note/certification:Progressing: FOTO 30 pts (6/23/22)  Current:    PLAN  [x]  Upgrade activities as tolerated     [x]  Continue plan of care  []  Update interventions per flow sheet       []  Discharge due to:_  []  Other:_      Paul Shaw, PTA 7/18/2022  1:32 PM    Future Appointments   Date Time Provider Diana Burton   7/21/2022  1:30 PM Shubham Park, PT [facility]   7/26/2022 11:15 AM Zoila Cohen PA [facility] AMB

## 2022-07-21 ENCOUNTER — HOSPITAL ENCOUNTER (OUTPATIENT)
Dept: PHYSICAL THERAPY | Age: 77
Discharge: HOME | End: 2022-07-21
Payer: MEDICARE

## 2022-07-21 PROCEDURE — 97110 THERAPEUTIC EXERCISES: CPT

## 2022-07-21 PROCEDURE — 97140 MANUAL THERAPY 1/> REGIONS: CPT

## 2022-07-21 PROCEDURE — 97530 THERAPEUTIC ACTIVITIES: CPT

## 2022-07-21 NOTE — PROGRESS NOTES
[facility] - [facility] [address]  [phone] fax    Continued Plan of Care/ Re-certification for Physical Therapy Services      Patient name: Ying Thakur Start of Care:  2022   Referral source: Devyn Donnelly Arikflorencioma : 1945   Medical/Treatment Diagnosis: Pain in right shoulder [M25.511]  Closed fracture of head of right humerus, initial encounter [S42.291O]  Payor: Ruth Reese / Plan: [employer] / Product Type: Managed Care Medicare /  Onset Date::22 (surgery)          Prior Hospitalization: see medical history Provider#: [id number]   Medications: Verified on Patient Summary List     Comorbidities: Arthritis; Back pain; Headaches; Thyroid problems   Prior Level of Function: Retired; lives in studio apartment alone; manages home independently; walking program for exercise, completes stained glass projects    Visits from Start of Care: 12    Missed Visits: 0    The Plan of Care and following information is based on the patient's current status:  Progress towards goals / Updated goals:  Goal: Pt to be compliant with initial HEP to improve cervical, right shoulder, elbow, wrist mobility  Status at last note/certification: met: pt reports compliance with HEP   Goal: Pt to increase right shoulder ROM in Flex, ABD to at least 90 deg without increased pain to progress towards AAROM activities. Status at last note/certification: progressing.  PROM: flex: 85 degrees, and abd/scaption: 45 degrees (22)   Current: PROM shoulder flexion: 90 deg (visual approximation) (22) progressing; 22: AAROM Right shoulder flexion: 82 deg.  22: PROM Right shoulder flexion: 100 deg (p!) abduction: 70 deg   After pec release: abduction/scaption: 90 deg; ER: 28 deg, met   Goal: Pt to increase right shoulder ER ROM in scapular plane to 45 deg without increased pain for ease of reaching, ADLs when cleared by MD.  Status at last note/certification: not met Unable to assess secondary to protocol, restriction (6/23/22)   Current: pre manual: PROM: ER: 25 deg, post manual: ER: 28 deg; progressing (7/21/22) not met   Goal: Pt to report no difficulty with washing same side of face for ease of bathing, grooming tasks. Status at last note/certification: Not met. Pt unable to perform hygiene with right side at this time per surgical precautions. (6/23/22)  Current: Patient reports no difficulty - she is taking a shower- washing hair (7/7/22) met   Goal: Pt to report < 5/10 pain at worst to increase ease with ADLs. Status at last note/certification:  progressing; 6-7/10 pain at worst (6/23/22)  Current: Pain at worst in the last week: 6/10 (6/30/22) Pain at worst in the last week: 6/10; on average: 6-7/10 (7/21/22) not met    Goal: Pt to report FOTO score of 52 pts to show improved function and quality of life. Status at last note/certification:Progressing: FOTO 30 pts (6/23/22)  Current: 40, progressing (7/21/22) not met     Key functional changes: Pain at worst in the last week: 6/10; on average: 6-7/10  Subjective % improvement: 30%  Functional improvements:  My wrist and elbow feel better, sleeping tolerance  Functional limitations: still cant  my arm by myself to move  FOTO: 40     Problems/ barriers to goal attainment: none     Problem List: pain affecting function, decrease ROM, decrease strength, decrease ADL/ functional abilitiies, decrease activity tolerance, and decrease flexibility/ joint mobility    Treatment Plan: Therapeutic exercise, Therapeutic activities, Neuromuscular re-education, Physical agent/modality, Manual therapy, Aquatic therapy, Patient education, Self Care training, Functional mobility training, and Home safety training     Patient Goal (s) has been updated and includes: ability to use right UE     Goals for this certification period to be accomplished in 5 weeks:  Goal: Pt to increase right shoulder A/AAROM in Flex, ABD to at least 120 deg without increased pain for ease with grooming and overhead reaching. Status at last note/certification: 16: AAROM Right shoulder flexion: 82 deg.  22: PROM Right shoulder flexion: 100 deg (p!) abduction: 70 deg   After pec release: abduction/scaption: 90 deg  2. Goal: Pt to increase right shoulder ER ROM in scapular plane to 45 deg without increased pain for ease of reaching, ADLs when cleared by MD.  Status at last note/certification: pre manual: PROM: ER: 25 deg, post manual: ER: 28 deg; progressing  3. Goal: Pt to report < 5/10 pain at worst to increase ease with ADLs. Status at last note/certification:  Pain at worst in the last week: 6/10; on average: 6-7/10   4. Goal: Pt to report FOTO score of 52 pts to show improved function and quality of life. Status at last note/certification:Progressin    Frequency / Duration: Patient to be seen 2 times per week for 5 weeks:    Assessment / Recommendations:Current Status/summary of treatment:  Pt is a 68y.o. year old female who has been receiving skilled OP PT services at [facility] with Pain in right shoulder [M25.511]  Closed fracture of head of right humerus, initial encounter Julieta Rowe s/p Right reverse TSA on 22. Patient is currently 8 weeks post op and and is progressing within MD protocol restrictions. Pt has been seen for initial evaluation and 11 follow up visits  Functional and Objective measures taken:   PROM Right shoulder flexion: 100 deg (p!) abduction: 70 deg ER: 25 deg  After pec release: abduction/scaption: 90 deg; ER: 28 deg     Pt reports next MD appointment on 22. From visit on 22: AAROM Right shoulder flexion: 82 deg     Patient has initiated AAROM exercises for right shoulder with some pain reported.    Pt will continue to benefit from skilled OP PT 2 x per week for 5 weeks in order to address remaining and above mentioned impairments to return to PLOF and maximize patient's functional status and independence. Certification Period: 7/21/22- 8/19/22    Martha Donahue, PT 7/21/2022 6:03 PM    ________________________________________________________________________  I certify that the above Therapy Services are being furnished while the patient is under my care. I agree with the treatment plan and certify that this therapy is necessary. [] I have read the above and request that my patient continue as recommended.   [] I have read the above report and request that my patient continue therapy with the following changes/special instructions: ______________________________________  [] I have read the above report and request that my patient be discharged from therapy    Physician's Signature:____________Date:_________TIME:________     MAICO Watson  ** Signature, Date and Time must be completed for valid certification **    Please sign and return to [facility] - [address]  [phone] fax

## 2022-07-21 NOTE — PROGRESS NOTES
PT DAILY TREATMENT NOTE     Patient Name: Ying Thakur  Date:2022  : 1945  [x]  Patient  Verified  Payor: Ruth Reese / Plan: [employer] / Product Type: Managed Care Medicare /    In [id number] pm Out time:1416 pm  Total Treatment Time (min): 44  Visit #: 7 of 10    Medicare/BCBS Only   Total Timed Codes (min):  44 1:1 Treatment Time:  44       Treatment Area: Pain in right shoulder [M25.511]  Closed fracture of head of right humerus, initial encounter [S42.291A]    SUBJECTIVE  Pain Level (0-10 scale): 6  Any medication changes, allergies to medications, adverse drug reactions, diagnosis change, or new procedure performed?: [x] No    [] Yes (see summary sheet for update)  Subjective functional status/changes:   [] No changes reported  Pt reports soreness/pain at side of right shoulder    OBJECTIVE    17 min Therapeutic Exercise:  [x] See flow sheet :   Rationale: increase ROM and increase strength to improve the patients ability to perform daily tasks, grooming    8 min Therapeutic Activity:  [x]  See flow sheet : FOTO, goals, PN assess    Rationale: increase ROM and increase strength  to improve the patients ability to perform ADL's, grooming with greater ease. 19 min Manual Therapy:  STM to right deltoid, biceps, triceps. Right pec release. PROM flexion, scaption, ER   The manual therapy interventions were performed at a separate and distinct time from the therapeutic activities interventions.   Rationale: decrease pain, increase ROM and increase tissue extensibility to improve shoulder mobity and function          With   [x] TE   [] TA   [] neuro   [] other: Patient Education: [x] Review HEP    [] Progressed/Changed HEP based on:   [] positioning   [] body mechanics   [] transfers   [] heat/ice application    [] other:      Other Objective/Functional Measures:     Pain at worst in the last week: 6/10; on average: 6-7/10  Subjective % improvement: 30%  Functional improvements: My wrist and elbow feel better, sleeping tolerance  Functional limitations: still cant  my arm by myself to move  FOTO: 40     PROM Right shoulder flexion: 100 deg (p!) abduction: 70 deg ER: 25 deg  After pec release: abduction/scaption: 90 deg; ER: 28 deg    Pt reports next MD appointment on 7/26/22. Pain Level (0-10 scale) post treatment: 4    ASSESSMENT/Changes in Function: See PN    Patient will continue to benefit from skilled PT services to modify and progress therapeutic interventions, address functional mobility deficits, address ROM deficits, address strength deficits, analyze and address soft tissue restrictions, analyze and cue movement patterns, analyze and modify body mechanics/ergonomics, assess and modify postural abnormalities, address imbalance/dizziness and instruct in home and community integration to attain remaining goals. []  See Plan of Care  [x]  See progress note/recertification  []  See Discharge Summary         Progress towards goals / Updated goals:  Goal: Pt to be compliant with initial HEP to improve cervical, right shoulder, elbow, wrist mobility  Status at last note/certification: met: pt reports compliance with HEP   Goal: Pt to increase right shoulder ROM in Flex, ABD to at least 90 deg without increased pain to progress towards AAROM activities. Status at last note/certification: progressing.  PROM: flex: 85 degrees, and abd/scaption: 45 degrees (6/23/22)   Current: PROM shoulder flexion: 90 deg (visual approximation) (6/30/22) progressing; 7/7/22: AAROM Right shoulder flexion: 82 deg.  7/21/22: PROM Right shoulder flexion: 100 deg (p!) abduction: 70 deg   After pec release: abduction/scaption: 90 deg; ER: 28 deg, met   Goal: Pt to increase right shoulder ER ROM in scapular plane to 45 deg without increased pain for ease of reaching, ADLs when cleared by MD.  Status at last note/certification: not met Unable to assess secondary to protocol, restriction (6/23/22)   Current: pre manual: PROM: ER: 25 deg, post manual: ER: 28 deg; progressing (7/21/22) not met   Goal: Pt to report no difficulty with washing same side of face for ease of bathing, grooming tasks. Status at last note/certification: Not met. Pt unable to perform hygiene with right side at this time per surgical precautions. (6/23/22)  Current: Patient reports no difficulty - she is taking a shower- washing hair (7/7/22) met   Goal: Pt to report < 5/10 pain at worst to increase ease with ADLs. Status at last note/certification:  progressing; 6-7/10 pain at worst (6/23/22)  Current: Pain at worst in the last week: 6/10 (6/30/22) Pain at worst in the last week: 6/10; on average: 6-7/10 (7/21/22) not met    Goal: Pt to report FOTO score of 52 pts to show improved function and quality of life.   Status at last note/certification:Progressing: FOTO 30 pts (6/23/22)  Current: 40, progressing (7/21/22) not met     PLAN  []  Upgrade activities as tolerated     [x]  Continue plan of care  []  Update interventions per flow sheet       []  Discharge due to:_  [x]  Other: see JANE Winslow, PT 7/21/2022  6:00 PM     Future Appointments   Date Time Provider Diana Burton   7/25/2022  1:30 PM Sapna Monroe [facility]   7/26/2022 11:15 AM MAICO Perez [facility] BS AMB   7/28/2022  1:30 PM Srikanth Crowley [facility]   8/1/2022  1:30 PM Sapna Monroe [facility]   8/4/2022  1:30 PM Erica Bowens

## 2022-07-25 ENCOUNTER — APPOINTMENT (OUTPATIENT)
Dept: PHYSICAL THERAPY | Age: 77
End: 2022-07-25
Payer: MEDICARE

## 2022-07-25 ENCOUNTER — TELEPHONE (OUTPATIENT)
Dept: PHYSICAL THERAPY | Age: 77
End: 2022-07-25

## 2022-07-26 ENCOUNTER — OFFICE VISIT (OUTPATIENT)
Dept: ORTHOPEDIC SURGERY | Age: 77
End: 2022-07-26
Payer: MEDICARE

## 2022-07-26 VITALS — BODY MASS INDEX: 23.45 KG/M2 | WEIGHT: 127.4 LBS | HEIGHT: 62 IN | TEMPERATURE: 97.7 F

## 2022-07-26 DIAGNOSIS — S42.291D CLOSED FRACTURE OF HEAD OF RIGHT HUMERUS WITH ROUTINE HEALING, SUBSEQUENT ENCOUNTER: Primary | ICD-10-CM

## 2022-07-26 PROCEDURE — 99024 POSTOP FOLLOW-UP VISIT: CPT | Performed by: PHYSICIAN ASSISTANT

## 2022-07-26 PROCEDURE — 73030 X-RAY EXAM OF SHOULDER: CPT | Performed by: PHYSICIAN ASSISTANT

## 2022-07-26 NOTE — PROGRESS NOTES
Brandon Gray  1945     HISTORY OF PRESENT ILLNESS  Brandon Gray is a 68 y.o. female who presents today for reevaluation s/p Right reverse total shoulder arthroplasty on 5/20/22. Patient has been going to PT. Describes pain as a 3/10. Overall doing better. She does report a lump on the right side of her chest near the clavicle. She also felt a lump in her right upper arm which has since gone away. She reports concerns about her weight and worsening hair loss and has an upcoming appointment scheduled with her Endocrinologist and a liver specialist. Patient denies any fever, chills, chest pain, shortness of breath or calf pain. The remainder of the review of systems is negative. There are no new illness or injuries to report since last seen in the office. No changes in medications, allergies, social or family history. PHYSICAL EXAM:   Visit Vitals  Temp 97.7 °F (36.5 °C) (Temporal)   Ht 5' 2\" (1.575 m)   Wt 127 lb 6.4 oz (57.8 kg)   BMI 23.30 kg/m²        The patient is a well-developed, well-nourished female in no acute distress. The patient is alert and oriented times three. The patient appears to be well groomed. Mood and affect are normal.  ORTHOPEDIC EXAM of right shoulder:  Inspection: swelling present,  Bruising not present  Incision well healed  Passive glenohumeral abduction 0-50 degrees  Stability: Stable  Strength: n/a  2+ distal pulses    IMAGING: XR of the right shoulder with 3 views obtained in the office dated 7/26/2022 was reviewed and read by myself reveals: reverse total shoulder arthroplasty in position. Fracture fragment in place. Slight  callus formation    3 view xray images of Right shoulder on 6/24/2022 read and reviewed by myself reveal reverse total shoulder arthroplasty in position. Fracture fragment in place. no callus formation     IMPRESSION:  S/P Right reverse total shoulder arthroplasty  Encounter Diagnosis   Name Primary?     Closed fracture of head of right humerus with routine healing, subsequent encounter Yes         PLAN:   1. Patient improving post operatively. 2. Will continue with PT. Ross for scar massage. Was instructed on activity restrictions. Was provided with note stating it is medically necessary for her to have medical transport.     RTC 6 weeks    Scribed by Sancho Villagomeztingvincent Hdz) as dictated by ERICA Raza PA-C [facility]

## 2022-07-26 NOTE — LETTER
NOTIFICATION RETURN TO WORK / SCHOOL    7/26/2022 11:42 AM    Ms. Brandon Montoya  [address] [facility] [id number]      To Whom It May Concern:    Brandon Montoya is currently under the care of [address] [place] [address] Chuy Gavin. It is medically necessary for the patient to have medical transport at this point postoperatively. If there are questions or concerns please have the patient contact our office.     Sincerely,      Jem Has, PA

## 2022-07-28 ENCOUNTER — TELEPHONE (OUTPATIENT)
Dept: PHYSICAL THERAPY | Age: 77
End: 2022-07-28

## 2022-07-28 ENCOUNTER — APPOINTMENT (OUTPATIENT)
Dept: PHYSICAL THERAPY | Age: 77
End: 2022-07-28
Payer: MEDICARE

## 2022-08-01 ENCOUNTER — APPOINTMENT (OUTPATIENT)
Dept: PHYSICAL THERAPY | Age: 77
End: 2022-08-01
Payer: MEDICARE

## 2022-08-01 ENCOUNTER — TELEPHONE (OUTPATIENT)
Dept: PHYSICAL THERAPY | Age: 77
End: 2022-08-01

## 2022-08-04 ENCOUNTER — APPOINTMENT (OUTPATIENT)
Dept: PHYSICAL THERAPY | Age: 77
End: 2022-08-04
Payer: MEDICARE

## 2022-08-08 ENCOUNTER — HOSPITAL ENCOUNTER (OUTPATIENT)
Dept: PHYSICAL THERAPY | Age: 77
Discharge: HOME | End: 2022-08-08
Payer: MEDICARE

## 2022-08-08 PROCEDURE — 97140 MANUAL THERAPY 1/> REGIONS: CPT

## 2022-08-08 PROCEDURE — 97110 THERAPEUTIC EXERCISES: CPT

## 2022-08-08 NOTE — PROGRESS NOTES
Gastroenterology Note             Pre-operative History and Physical    Patient: Ann Meigs  : 1968  CSN:     History Obtained From:  patient and/or guardian. HISTORY OF PRESENT ILLNESS:    The patient is a 47 y.o. 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 this we have given him a previous trial without stents and this has not worked    Past Medical History:    Past Medical History:   Diagnosis Date    Alcohol abuse     Arthritis     BPH (benign prostatic hyperplasia)     CAD (coronary artery disease)     CABG (5 ) 10/2001    Carotid stenosis     Esophageal reflux     Hyperlipidemia     Hypertension     Pancreatitis      Past Surgical History:    Past Surgical History:   Procedure Laterality Date    APPENDECTOMY      CARDIAC SURGERY      10/21 CABG    CAROTID ENDARTERECTOMY Right     ERCP N/A 3/15/2022    ERCP STENT REMOVAL/EXCHANGE insertion of 1 single pigtail 5f 5cm pancreatic stent and insertion of 2 7Fx5cm biliary stents (1 [place] 1 Marinus Binet). performed by Andrew Felty, MD at [address] [place] [address]      left [facility]    PANCREAS SURGERY  2018     Medications Prior to Admission:   No current facility-administered medications on file prior to encounter. Current Outpatient Medications on File Prior to Encounter   Medication Sig Dispense Refill    aspirin 325 MG tablet Take 325 mg by mouth daily      Pancrelipase, Lip-Prot-Amyl, (CREON PO) Take by mouth 3 times daily (with meals)      gabapentin (NEURONTIN) 100 MG capsule Take 100 mg by mouth 3 times daily.       atorvastatin (LIPITOR) 20 MG tablet Take 20 mg by mouth daily      metoprolol succinate (TOPROL XL) 25 MG extended release tablet Take 25 mg by mouth daily      vitamin B-1 (THIAMINE) 100 MG tablet Take 100 mg by mouth daily      folic acid (FOLVITE) 1 PT DAILY TREATMENT NOTE     Patient Name: Duran Noguera  Date:2022  : 1945  [x]  Patient  Verified  Payor: Gwendolyn Maddenradha / Plan: [employer] [place] / Product Type: Managed Care Medicare /    In time:1:35  Out time: 2:15  Total Treatment Time (min): 40  Visit #: 1 of 10    Medicare/BCBS Only   Total Timed Codes (min):  40 1:1 Treatment Time:  40       Treatment Area: Pain in right shoulder [M25.511]  Closed fracture of head of right humerus, initial encounter [S42.291A]    SUBJECTIVE  Pain Level (0-10 scale): 5  Any medication changes, allergies to medications, adverse drug reactions, diagnosis change, or new procedure performed?: [x] No    [] Yes (see summary sheet for update)  Subjective functional status/changes:   [] No changes reported  I don't know if I've been doing too much since I was here last but my arm is sore and across  my neck. OBJECTIVE      25 min Therapeutic Exercise:  [] See flow sheet :   Rationale: increase ROM and increase strength to improve the patients ability to perform daily tasks, reaching    15 min Manual Therapy:  STM to pronator teres, biceps and brachialis; deltoid, pec major. SL scap mobs with trigger point release to medial scapular border (subscap release) Scap mobs   The manual therapy interventions were performed at a separate and distinct time from the therapeutic activities interventions.   Rationale: decrease pain, increase ROM, and increase tissue extensibility to improve shoulder, scapular  mobity, improve reach, ADL's          With   [] TE   [] TA   [] neuro   [] other: Patient Education: [x] Review HEP    [] Progressed/Changed HEP based on:   [] positioning   [] body mechanics   [] transfers   [] heat/ice application    [] other:      Other Objective/Functional Measures:   Functional deficits: cant brush teeth with right UE     Pain Level (0-10 scale) post treatment: 0    ASSESSMENT/Changes in Function: pt seen today to address right shoulder pain. Pt reports increased c/o pain and stiffness since last session, extended absence due to transportation issues. Palpable tightness sin right shoulder quadrant, rhomboids, subscap and anterior shoulder muscles. Unable to use UE to brush teeth or open/close refrigerator door. Patient will continue to benefit from skilled PT services to modify and progress therapeutic interventions, address functional mobility deficits, address ROM deficits, address strength deficits, analyze and address soft tissue restrictions, analyze and cue movement patterns, analyze and modify body mechanics/ergonomics, assess and modify postural abnormalities, address imbalance/dizziness, and instruct in home and community integration to attain remaining goals. []  See Plan of Care  []  See progress note/recertification  []  See Discharge Summary         Progress towards goals / Updated goals:  Goal: Pt to increase right shoulder A/AAROM in Flex, ABD to at least 120 deg without increased pain for ease with grooming and overhead reaching. Status at last note/certification: 47: AAROM Right shoulder flexion: 82 deg.  22: PROM Right shoulder flexion: 100 deg (p!) abduction: 70 deg   After pec release: abduction/scaption: 90 deg  2. Goal: Pt to increase right shoulder ER ROM in scapular plane to 45 deg without increased pain for ease of reaching, ADLs when cleared by MD.  Status at last note/certification: pre manual: PROM: ER: 25 deg, post manual: ER: 28 deg; progressing  3. Goal: Pt to report < 5/10 pain at worst to increase ease with ADLs. Status at last note/certification:  Pain at worst in the last week: 6/10; on average: 6-7/10   4. Goal: Pt to report FOTO score of 52 pts to show improved function and quality of life.   Status at last note/certification:Progressin    PLAN  [x]  Upgrade activities as tolerated     []  Continue plan of care  []  Update interventions per flow sheet       [] Discharge due to:_  []  Other:_      Manuel Felton, PTA 8/8/2022  1:37 PM    Future Appointments   Date Time Provider Diana Burton   8/11/2022  3:00 PM [facility]   8/15/2022  1:30 PM Bita Nguyen, [facility]   8/18/2022  3:00 PM [facility]   8/22/2022  1:30 PM Regi Sims, [facility]   8/25/2022  3:00 PM [facility]   8/29/2022  1:30 PM Regi Sims, [facility]   9/1/2022  3:00 PM [place], [address]   9/6/2022 10:45 AM Shaggy Velasquez PA [facility] BS AMB

## 2022-08-11 ENCOUNTER — HOSPITAL ENCOUNTER (OUTPATIENT)
Dept: PHYSICAL THERAPY | Age: 77
Discharge: HOME | End: 2022-08-11
Payer: MEDICARE

## 2022-08-11 PROCEDURE — 97140 MANUAL THERAPY 1/> REGIONS: CPT

## 2022-08-11 PROCEDURE — 97110 THERAPEUTIC EXERCISES: CPT

## 2022-08-11 PROCEDURE — 97530 THERAPEUTIC ACTIVITIES: CPT

## 2022-08-11 NOTE — PROGRESS NOTES
PT DAILY TREATMENT NOTE     Patient Name: Scarlett Pinon  Date:2022  : 1945  [x]  Patient  Verified  Payor: Maya Shoulder / Plan: [employer] / Product Type: Managed Care Medicare /    In time:230  Out time:315  Total Treatment Time (min): 45  Visit #: 2 of 10    Medicare/BCBS Only   Total Timed Codes (min):  45 1:1 Treatment Time:  45       Treatment Area: Pain in right shoulder [M25.511]  Closed fracture of head of right humerus, initial encounter [S42.291A]    SUBJECTIVE  Pain Level (0-10 scale): 4/10  Any medication changes, allergies to medications, adverse drug reactions, diagnosis change, or new procedure performed?: [x] No    [] Yes (see summary sheet for update)  Subjective functional status/changes:   [] No changes reported  Pt reports she continues to wear her sling when she leaves the house in an attempt to protect her arm. OBJECTIVE    22 min Therapeutic Exercise:  [] See flow sheet :   Rationale: increase ROM, increase strength, improve coordination, and improve balance to improve the patients ability to tolerate functional mobility and daily routine     8 min Therapeutic Activity:  []  See flow sheet :Discussed importance of d/c sling to achieve increased joint mobility and normal arm swing with gait and functional mobility    Rationale: increase ROM, increase strength, improve coordination, and improve balance  to improve the patients ability to tolerate ADLs and daily routine. 15 min Manual Therapy:  STM/TPR to right upper trap, levator scap, and pectoralis, and biceps muscle, Scapular mobilizations in all directions. The manual therapy interventions were performed at a separate and distinct time from the therapeutic activities interventions.   Rationale: decrease pain, increase ROM, increase tissue extensibility, decrease trigger points, and increase postural awareness to tolerate functional mobility and daily routine      With   [] TE   [] TA   [] neuro   [] other: Patient Education: [x] Review HEP    [] Progressed/Changed HEP based on:   [] positioning   [] body mechanics   [] transfers   [] heat/ice application    [] other:      Pain Level (0-10 scale) post treatment: 3/10    ASSESSMENT/Changes in Function: Pt seen by PT to address right shoulder pain, strength, ROM, and functional mobility. Pt with good tolerance of the session with minimal to no lasting increase in pain or discomfort. Pt challenged with movement of right arm demonstrating decreased arm swing with gait and maintaining increased adduction and elbow flexion at rest. PT provided intermittent verbal/tactile cues for optimal form and alignment. Patient will continue to benefit from skilled PT services to modify and progress therapeutic interventions, address functional mobility deficits, address ROM deficits, address strength deficits, analyze and address soft tissue restrictions, analyze and cue movement patterns, analyze and modify body mechanics/ergonomics, assess and modify postural abnormalities, address imbalance/dizziness, and instruct in home and community integration to attain remaining goals. []  See Plan of Care  []  See progress note/recertification  []  See Discharge Summary         Progress towards goals / Updated goals:  Goal: Pt to increase right shoulder A/AAROM in Flex, ABD to at least 120 deg without increased pain for ease with grooming and overhead reaching. Status at last note/certification: 5/2/88: AAROM Right shoulder flexion: 82 deg.  7/21/22: PROM Right shoulder flexion: 100 deg (p!) abduction: 70 deg   After pec release: abduction/scaption: 90 deg  2. Goal: Pt to increase right shoulder ER ROM in scapular plane to 45 deg without increased pain for ease of reaching, ADLs when cleared by MD.  Status at last note/certification: pre manual: PROM: ER: 25 deg, post manual: ER: 28 deg; progressing  3.  Goal: Pt to report < 5/10 pain at worst to increase ease with ADLs. Status at last note/certification:  Pain at worst in the last week: 6/10; on average: 6-7/10   4. Goal: Pt to report FOTO score of 52 pts to show improved function and quality of life.   Status at last note/certification:Progressin    PLAN  [x]  Upgrade activities as tolerated     [x]  Continue plan of care  []  Update interventions per flow sheet       []  Discharge due to:_  []  Other:_      Deysi Chowdary, PT 2022  2:44 PM    Future Appointments   Date Time Provider Diana Burton   2022  3:00 PM Julio Minor, PT [facility]   8/15/2022  1:30 PM Fior Srinivasan, PTA [facility]   2022  3:00 PM Robbie Muster [employer] [facility]   2022  1:30 PM Alex Jimenez, PT [facility]   2022  3:00 PM Robbie [facility]   2022  1:30 PM Alex Jimenez PT [facility]   2022  3:00 PM Gavin Muster [employer] [facility]   2022 10:45 AM Brena Bowling, Zenia Boxer, PA [facility] AM[facility]

## 2022-08-18 ENCOUNTER — HOSPITAL ENCOUNTER (OUTPATIENT)
Dept: PHYSICAL THERAPY | Age: 77
Discharge: HOME | End: 2022-08-18
Payer: MEDICARE

## 2022-08-18 PROCEDURE — 97140 MANUAL THERAPY 1/> REGIONS: CPT

## 2022-08-18 PROCEDURE — 97530 THERAPEUTIC ACTIVITIES: CPT

## 2022-08-18 PROCEDURE — 97110 THERAPEUTIC EXERCISES: CPT

## 2022-08-18 NOTE — PROGRESS NOTES
PT DAILY TREATMENT NOTE     Patient Name: Ra Sanchez  Date:2022  : 1945  [x]  Patient  Verified  Payor: Ricardo Reid / Plan: [employer] / Product Type: Managed Care Medicare /    In time:3:00  Out time:3:45  Total Treatment Time (min): 45  Visit #: 3 of 10    Medicare/BCBS Only   Total Timed Codes (min):  45 1:1 Treatment Time:  45       Treatment Area: Pain in right shoulder [M25.511]  Closed fracture of head of right humerus, initial encounter [S42.291A]    SUBJECTIVE  Pain Level (0-10 scale): 3  Any medication changes, allergies to medications, adverse drug reactions, diagnosis change, or new procedure performed?: [x] No    [] Yes (see summary sheet for update)  Subjective functional status/changes:   [] No changes reported  I did try vacuuming, but wasn't very good  mostly using left UE    OBJECTIVE      23 min Therapeutic Exercise:  [] See flow sheet :   Rationale: increase ROM and increase strength to improve the patients ability to perform daily tasks , grooming    12 min Therapeutic Activity:  []  See flow sheet :   Rationale: increase strength and improve coordination  to improve the patients ability to perform reaching     10 min Manual Therapy:  STM to anterior deltoid, scar. Manual stretching, gently into flexion/scaption and ER   The manual therapy interventions were performed at a separate and distinct time from the therapeutic activities interventions. Rationale: decrease pain, increase ROM, and increase tissue extensibility to perform ADL's, grooming          With   [] TE   [] TA   [] neuro   [] other: Patient Education: [x] Review HEP    [] Progressed/Changed HEP based on:   [] positioning   [] body mechanics   [] transfers   [] heat/ice application    [] other:      Other Objective/Functional Measures:   Functional Gains: reach face to wash, use to assist left UE to hold pot, cup. Write some if UE is supported.   Hold a book, reach to faucet but can't turn knob  Deficits: difficulty washing hair,using left primarily   60% improved    Strength: flexion/scaption 3-/5  Pain Level (0-10 scale) post treatment: 2    ASSESSMENT/Changes in Function: see goals    Patient will continue to benefit from skilled PT services to modify and progress therapeutic interventions, address functional mobility deficits, address ROM deficits, address strength deficits, analyze and address soft tissue restrictions, analyze and cue movement patterns, analyze and modify body mechanics/ergonomics, assess and modify postural abnormalities, address imbalance/dizziness, and instruct in home and community integration to attain remaining goals. []  See Plan of Care  []  See progress note/recertification  []  See Discharge Summary         Progress towards goals / Updated goals:  Goal: Pt to be compliant with initial HEP to improve cervical, right shoulder, elbow, wrist mobility  Status at last note/certification: met: pt reports compliance with HEP   Goal: Pt to increase right shoulder ROM in Flex, ABD to at least 90 deg without increased pain to progress towards AAROM activities. Status at last note/certification: progressing. PROM: flex: 85 degrees, and abd/scaption: 45 degrees (6/23/22)   Current: PROM shoulder flexion: 110 degrees, scaption 85 ; AROM flexion 58 degrees, scaption 48  Goal: Pt to increase right shoulder ER ROM in scapular plane to 45 deg without increased pain for ease of reaching, ADLs when cleared by MD.  Status at last note/certification: not met Unable to assess secondary to protocol, restriction (6/23/22)   Current: progressing 30 degrees ER at 45 degrees abduction   Goal: Pt to report no difficulty with washing same side of face for ease of bathing, grooming tasks. Status at last note/certification: Not met.  Pt unable to perform hygiene with right side at this time per surgical precautions. (6/23/22)  Current: Patient reports no difficulty - she is taking a shower- washing hair (7/7/22) met   Goal: Pt to report < 5/10 pain at worst to increase ease with ADLs. Status at last note/certification:  progressing; 6-7/10 pain at worst (6/23/22)  Current: Pain at worst in the last week: 3/10 (8/18/2022) progressing   Goal: Pt to report FOTO score of 52 pts to show improved function and quality of life.   Status at last note/certification:Progressing: FOTO 30 pts (6/23/22)  Current: Progressing  FOTO 42     PLAN  [x]  Upgrade activities as tolerated     []  Continue plan of care  []  Update interventions per flow sheet       []  Discharge due to:_  []  Other:_      Tali Terry, TEE 8/18/2022  2:36 PM    Future Appointments   Date Time Provider Diana Burton   8/18/2022  3:00 PM Roscoe Caras [facility]   8/22/2022  1:30 PM Angel Cooper, PT [facility]   8/25/2022  3:00 PM Roscoe Caras [facility]   8/29/2022  1:30 PM Angel Cooper PT [facility]   9/1/2022  3:00 PM [place], [address]   9/6/2022 10:45 AM Mikael Yepez Speaker, PA [facility] AMB

## 2022-08-18 NOTE — PROGRESS NOTES
[facility] - [facility], [address] [place] [address]  [phone] fax    Continued Plan of Care/ Re-certification for Physical Therapy Services      Patient name: Blanco Subramanian Start of Care:  2022   Referral source: Leda Low, Razia Mora : 1945   Medical/Treatment Diagnosis: Pain in right shoulder [M25.511]  Closed fracture of head of right humerus, initial encounter [S42.291A]  Payor: Xiao Lovelace / Plan: [employer] / Product Type: Managed Care Medicare /  Onset Date:22 (surgery)            Prior Hospitalization: see medical history Provider#: [id number]   Medications: Verified on Patient Summary List    Comorbidities:  Arthritis; Back pain; Headaches; Thyroid problems  Prior Level of Function:Retired; lives in studio apartment alone; manages home independently; walking program for exercise, completes stained glass projects    Visits from Start of Care: 15    Missed Visits: 2    The Plan of Care and following information is based on the patient's current status:  Goal: Pt to be compliant with initial HEP to improve cervical, right shoulder, elbow, wrist mobility  Status at last note/certification: met: pt reports compliance with HEP   Goal: Pt to increase right shoulder ROM in Flex, ABD to at least 90 deg without increased pain to progress towards AAROM activities. Status at last note/certification: progressing.  PROM: flex: 85 degrees, and abd/scaption: 45 degrees (22)   Current: PROM shoulder flexion: 110 degrees, scaption 85 ; AROM flexion 58 degrees, scaption 48, not met   Goal: Pt to increase right shoulder ER ROM in scapular plane to 45 deg without increased pain for ease of reaching, ADLs when cleared by MD.  Status at last note/certification: not met Unable to assess secondary to protocol, restriction (22)   Current: progressing 30 degrees ER at 45 degrees abduction, not met   Goal: Pt to report no difficulty with washing same side of face for ease of bathing, grooming tasks. Status at last note/certification: Not met. Pt unable to perform hygiene with right side at this time per surgical precautions. (6/23/22)  Current: Patient reports no difficulty - she is taking a shower- washing hair (7/7/22) met   Goal: Pt to report < 5/10 pain at worst to increase ease with ADLs. Status at last note/certification:  progressing; 6-7/10 pain at worst (6/23/22)  Current: Pain at worst in the last week: 3/10 (8/18/2022) met  Goal: Pt to report FOTO score of 52 pts to show improved function and quality of life. Status at last note/certification:Progressing: FOTO 30 pts (6/23/22)  Current: Progressing  FOTO 42 , not met    Key functional changes: Patient has attended 15 sessions of skilled PT to address   Pain in right shoulder [M25.511], Closed fracture of head of right humerus, initial encounter [S42.291A]. Patient currently 13 weeks s/p Right reverse TSA on 5/20/22. She demonstrates improved PROM, and overall pain has diminished. Functional Gains: reach face to wash, use to assist left UE to hold pot, cup. Write some if UE is supported.   Hold a book, reach to faucet but can't turn knob  Deficits: difficulty washing hair,using left primarily   60% improved     Problems/ barriers to goal attainment: none     Problem List: pain affecting function, decrease ROM, decrease strength, decrease ADL/ functional abilitiies, decrease activity tolerance, and decrease flexibility/ joint mobility    Treatment Plan:  Therapeutic exercise, Therapeutic activities, Neuromuscular re-education, Physical agent/modality, Manual therapy, Aquatic therapy, Patient education, Self Care training, Functional mobility training, and Home safety training                 Patient Goal (s) has been updated and includes: \"improve use of my arm\"     Goals for this certification period to be accomplished in 10 treatments:  Goal: Pt to increase right shoulder ER ROM in scapular plane to 45 deg without increased pain for ease of reaching, ADLs  Status at last note/certification: 30 degrees ER at 45 degrees abduction   2. Goal: Pt to report FOTO score of 52 pts to show improved function and quality of life. Status at last note/certification:  FOTO 42   3. Increase Right Sh AROM in flexion/scaption to at least 110 to facilitate ADLs, overhead reaching. Status at last certification/assessment: AROM flexion 58 degrees, scaption 48  4. Increase Right Sh strength grossly to at least 3+/5 to facilitate ADL's, lifting, carrying. Status at last certification/assessment: flexion/scaption: at least 2+/5 to 3-/5     Frequency / Duration: Patient to be seen 2 times per week for 5 weeks:    Assessment / Recommendations:Patient will benefit from continued skilled PT to address ROM, strength, functional use of UE for ADL's, household tasks and grooming. Certification Period: 8/19/2022 to 9/17/2022    Maranda Little, PTA 8/18/2022 3:43 PM  Ellis Oreilly, PT 8/18/22 4:05 PM     ________________________________________________________________________  I certify that the above Therapy Services are being furnished while the patient is under my care. I agree with the treatment plan and certify that this therapy is necessary. [] I have read the above and request that my patient continue as recommended.   [] I have read the above report and request that my patient continue therapy with the following changes/special instructions: ______________________________________  [] I have read the above report and request that my patient be discharged from therapy    Physician's Signature:____________Date:_________TIME:________     MAICO Ponce  ** Signature, Date and Time must be completed for valid certification **    Please sign and return to [facility] - Lenore Carrion  [facility], [address] [place] [address]  [phone] fax

## 2022-08-22 ENCOUNTER — HOSPITAL ENCOUNTER (OUTPATIENT)
Dept: PHYSICAL THERAPY | Age: 77
Discharge: HOME | End: 2022-08-22
Payer: MEDICARE

## 2022-08-22 PROCEDURE — 97112 NEUROMUSCULAR REEDUCATION: CPT

## 2022-08-22 PROCEDURE — 97110 THERAPEUTIC EXERCISES: CPT

## 2022-08-22 PROCEDURE — 97530 THERAPEUTIC ACTIVITIES: CPT

## 2022-08-22 NOTE — PROGRESS NOTES
PT DAILY TREATMENT NOTE     Patient Name: Kayla Armstrong  Date:2022  : 1945  [x]  Patient  Verified  Payor: Raul Fung / Plan: [facility] MEDICARE ADVANTAGE / Product Type: Managed Care Medicare /    In time:1335 pm  Out time:1423 pm  Total Treatment Time (min): 48  Visit #: 1 of 10    Medicare/BCBS Only   Total Timed Codes (min):  43 1:1 Treatment Time:  43       Treatment Area: Pain in right shoulder [M25.511]  Closed fracture of head of right humerus, initial encounter [S42.760A]    SUBJECTIVE  Pain Level (0-10 scale): 3  Any medication changes, allergies to medications, adverse drug reactions, diagnosis change, or new procedure performed?: [x] No    [] Yes (see summary sheet for update)  Subjective functional status/changes:   [] No changes reported  Pt reports soreness in right shoulder.      \"I'm getting to reach higher\"    OBJECTIVE    Modality rationale: decrease pain to improve the patients ability to perform overhead reaching and UE functional activities with decreased discomfort    Min Type Additional Details    [] Estim:  []Unatt       []IFC  []Premod                        []Other:  []w/ice   []w/heat  Position:   Location:     [] Estim: []Att    []TENS instruct  []NMES                    []Other:  []w/US   []w/ice   []w/heat  Position:  Location:    []  Traction: [] Cervical       []Lumbar                       [] Prone          []Supine                       []Intermittent   []Continuous Lbs:  [] before manual  [] after manual    []  Ultrasound: []Continuous   [] Pulsed                           []1MHz   []3MHz W/cm2:  Location:    []  Iontophoresis with dexamethasone         Location: [] Take home patch   [] In clinic   5 [x]  Ice     []  heat  []  Ice massage  []  Laser   []  Anodyne Position: sitting  Location: right shoulder    []  Laser with stim  []  Other:  Position:  Location:    []  Vasopneumatic Device    []  Right     []  Left  Pre-treatment girth:  Post-treatment girth:  Measured at (location):  Pressure:       [] lo [] med [] hi   Temperature: [] lo [] med [] hi   [x] Skin assessment post-treatment:  [x]intact []redness- no adverse reaction    []redness - adverse reaction:       25 min Therapeutic Exercise:  [x] See flow sheet :   Rationale: increase ROM and increase strength to improve the patients ability to perform ADLs with improved shoulder/elbow strength and mobility. 8 min Therapeutic Activity:  [x]  See flow sheet:   Wall push ups, wall wipes, reclined salutes    Rationale: increase ROM, increase strength, improve coordination, improve balance, and increase proprioception  to improve the patients ability to perform functional overhead/forward lifts. Patient education: updated HEP     10 min Neuromuscular Re-education:  [x]  See flow sheet: SA punches, prone I, rows/ext    Rationale: increase ROM, increase strength, improve coordination, improve balance and increase proprioception  to improve the patients ability to perform overhead functional lifts with improved scapular stabilization and neutral cervical posture. With   [x] TE   [x] TA   [x] neuro   [] other: Patient Education: [x] Review HEP    [] Progressed/Changed HEP based on:   [] positioning   [] body mechanics   [] transfers   [] heat/ice application    [] other:      Other Objective/Functional Measures:   Progressed program per flow sheet for strengthening     AROM supine: flexion: 113 deg     Pain Level (0-10 scale) post treatment: 5/10    ASSESSMENT/Changes in Function: Able to perform progressed exercises with reports of slight increased pain (5/10). Demonstration and skilled cues provided to perform all exercises with proper form. Pt was instructed in updated HEP. Pt was given hand out detailing exercises and instructed in modification of exercises to tolerance, and in performing exercises safely. Pt verbalized understanding.      Patient will continue to benefit from skilled PT services to modify and progress therapeutic interventions, address functional mobility deficits, address ROM deficits, address strength deficits, analyze and address soft tissue restrictions, analyze and cue movement patterns, analyze and modify body mechanics/ergonomics, assess and modify postural abnormalities, address imbalance/dizziness and instruct in home and community integration to attain remaining goals. []  See Plan of Care  []  See progress note/recertification  []  See Discharge Summary         Progress towards goals / Updated goals:  Goal: Pt to increase right shoulder ER ROM in scapular plane to 45 deg without increased pain for ease of reaching, ADLs  Status at last note/certification: 30 degrees ER at 45 degrees abduction   Current:   2. Goal: Pt to report FOTO score of 52 pts to show improved function and quality of life. Status at last note/certification:  FOTO 42   Current:   3. Increase Right Sh AROM in flexion/scaption to at least 110 to facilitate ADLs, overhead reaching. Status at last certification/assessment: AROM flexion 58 degrees, scaption 48  Current: AROM supine: flexion: 113 deg 8/22/22   4. Increase Right Sh strength grossly to at least 3+/5 to facilitate ADL's, lifting, carrying.    Status at last certification/assessment: flexion/scaption: at least 2+/5 to 3-/5   Current: goal in progress- progressed program per flow sheet (8/22/22)     PLAN  [x]  Upgrade activities as tolerated     [x]  Continue plan of care  []  Update interventions per flow sheet       []  Discharge due to:_  []  Other:_      Cyrus Johnson, PT 8/22/2022 3:12 PM     Future Appointments   Date Time Provider Diana Burton   8/25/2022  3:00 PM [facility]   8/29/2022  1:30 PM Wendy Sam PT [facility]   9/1/2022  3:00 PM [place] [address]   9/6/2022 10:45 AM [facility], MAICO Garzon [facility] BS AMB

## 2022-08-25 ENCOUNTER — HOSPITAL ENCOUNTER (OUTPATIENT)
Dept: PHYSICAL THERAPY | Age: 77
Discharge: HOME | End: 2022-08-25
Payer: MEDICARE

## 2022-08-25 PROCEDURE — 97140 MANUAL THERAPY 1/> REGIONS: CPT

## 2022-08-25 PROCEDURE — 97110 THERAPEUTIC EXERCISES: CPT

## 2022-08-25 PROCEDURE — 97112 NEUROMUSCULAR REEDUCATION: CPT

## 2022-08-25 NOTE — PROGRESS NOTES
PT DAILY TREATMENT NOTE     Patient Name: Dena Herrmann  Date:2022  : 1945  [x]  Patient  Verified  Payor: Bridger Mathews / [facility] / Product Type: Managed Care Medicare /    In time:3:00  Out time:3:45  Total Treatment Time (min): 45  Visit #: 2 of 10    Medicare/BCBS Only   Total Timed Codes (min):  45 1:1 Treatment Time:  45       Treatment Area: Pain in right shoulder [M25.511]  Closed fracture of head of right humerus, initial encounter [S42.291A]    SUBJECTIVE  Pain Level (0-10 scale): 5-6  Any medication changes, allergies to medications, adverse drug reactions, diagnosis change, or new procedure performed?: [x] No    [] Yes (see summary sheet for update)  Subjective functional status/changes:   [] No changes reported  I finally brushed my teeth with my right arm today and drove some. OBJECTIVE      23 min Therapeutic Exercise:  [] See flow sheet :   Rationale: increase ROM and increase strength to improve the patients ability to perform daily tasks, reaching, dressing     12 min Neuromuscular Re-education:  []  See flow sheet :   Rationale: increase strength and improve coordination  to improve the patients ability to increase shoulder stability    10 min Manual Therapy:  STM to right biceps, deltoid, pec major and coracobrachialis. Manual stretching for flexion, scaption   The manual therapy interventions were performed at a separate and distinct time from the therapeutic activities interventions.   Rationale: decrease pain, increase ROM, and increase tissue extensibility to improve ease of reaching, ADL's,           With   [] TE   [] TA   [] neuro   [] other: Patient Education: [x] Review HEP    [] Progressed/Changed HEP based on:   [] positioning   [] body mechanics   [] transfers   [] heat/ice application    [] other:      Other Objective/Functional Measures: ex's per card     Pain Level (0-10 scale) post treatment: 4    ASSESSMENT/Changes in Function: pt continues to have pain and significant tightness along pec major limiting flexion, abduction ROM. Improvements have enabled her to use UE for brushing teeth and driving short distances. Patient will continue to benefit from skilled PT services to modify and progress therapeutic interventions, address functional mobility deficits, address ROM deficits, address strength deficits, analyze and address soft tissue restrictions, analyze and cue movement patterns, analyze and modify body mechanics/ergonomics, assess and modify postural abnormalities, address imbalance/dizziness, and instruct in home and community integration to attain remaining goals. []  See Plan of Care  []  See progress note/recertification  []  See Discharge Summary         Progress towards goals / Updated goals:  Goal: Pt to be compliant with initial HEP to improve cervical, right shoulder, elbow, wrist mobility  Status at last note/certification: met: pt reports compliance with HEP   Goal: Pt to increase right shoulder ROM in Flex, ABD to at least 90 deg without increased pain to progress towards AAROM activities. Status at last note/certification: progressing. PROM: flex: 85 degrees, and abd/scaption: 45 degrees (6/23/22)   Current: PROM shoulder flexion: 90 deg (visual approximation) (6/30/22) progressing; 7/7/22: AAROM Right shoulder flexion: 82 deg, progressing    Goal: Pt to increase right shoulder ER ROM in scapular plane to 45 deg without increased pain for ease of reaching, ADLs when cleared by MD.  Status at last note/certification: not met Unable to assess secondary to protocol, restriction (6/23/22)   Current:  Goal: Pt to report no difficulty with washing same side of face for ease of bathing, grooming tasks. Status at last note/certification: Not met.  Pt unable to perform hygiene with right side at this time per surgical precautions. (6/23/22)  Current: Patient reports no difficulty - she is taking a shower- washing hair (7/7/22) met   Goal: Pt to report < 5/10 pain at worst to increase ease with ADLs. Status at last note/certification:  progressing; 6-7/10 pain at worst (6/23/22)  Current: Pain at worst in the last week: 6/10 (6/30/22) progressing   Goal: Pt to report FOTO score of 52 pts to show improved function and quality of life.   Status at last note/certification:Progressing: FOTO 30 pts (6/23/22)  Current:    PLAN  [x]  Upgrade activities as tolerated     []  Continue plan of care  []  Update interventions per flow sheet       []  Discharge due to:_  []  Other:_      Felecia Garcia, TEE 8/25/2022  3:01 PM    Future Appointments   Date Time Provider Diana Burton   8/29/2022  1:30 PM Natasha Gomez PT [facility]   9/1/2022  3:00 PM [place] [address]   9/6/2022 10:45 AM Makenzie Cain PA [facility] BS AMB

## 2022-08-29 ENCOUNTER — HOSPITAL ENCOUNTER (OUTPATIENT)
Dept: PHYSICAL THERAPY | Age: 77
Discharge: HOME | End: 2022-08-29
Payer: MEDICARE

## 2022-08-29 PROCEDURE — 97140 MANUAL THERAPY 1/> REGIONS: CPT

## 2022-08-29 PROCEDURE — 97110 THERAPEUTIC EXERCISES: CPT

## 2022-08-29 NOTE — QM NOTE
PT DAILY TREATMENT NOTE     Patient Name: Lenny Haney  Date:2022  : 1945  [x]  Patient  Verified  Payor: Haylee Manzano / Plan: [employer] / Product Type: Managed Care Medicare /    In time:1:31  Out time:2:19  Total Treatment Time (min): 48  Visit #: 4 of 10    Medicare/BCBS Only   Total Timed Codes (min):  48 1:1 Treatment Time:  48       Treatment Area: Pain in right shoulder [M25.511]  Closed fracture of head of right humerus, initial encounter [S42.291A]    SUBJECTIVE  Pain Level (0-10 scale): 6-710  Any medication changes, allergies to medications, adverse drug reactions, diagnosis change, or new procedure performed?: [x] No    [] Yes (see summary sheet for update)  Subjective functional status/changes:   [] No changes reported  \"I have reached the point of discouragement. I continue to get this pain in the arm. It feels like the muscle grabs and tightens up and its hard to continue to keep moving. It seems like the more I try to do or stretch, it worsens. \"    OBJECTIVE    33 min Therapeutic Exercise:  [] See flow sheet :   Rationale: increase ROM and increase strength to improve the patients ability to perform daily tasks, grooming    15 min Manual Therapy: Reclined STM to right deltoid, biceps; manual stretching with gentle long axis distraction in Flex, Scaption   The manual therapy interventions were performed at a separate and distinct time from the therapeutic activities interventions. Rationale: decrease pain, increase ROM and increase tissue extensibility to improve shoulder mobility and function          With   [] TE   [] TA   [] neuro   [] other: Patient Education: [x] Review HEP    [] Progressed/Changed HEP based on:   [] positioning   [] body mechanics   [] transfers   [] heat/ice application    [] other:      Other Objective/Functional Measures: Performed abbreviated exercise interventions due to increased pain this date.      Pain Level (0-10 scale) post treatment: 5/10    ASSESSMENT/Changes in Function: Pt arrived in more distress this date and requested therapy interventions be lessened in intensity and repetitions due to increased pain in right arm. Pt exhibited visible discomfort and frequently grabbed at right arm during session. Pt's exercises were modified but discomfort still noted. Performed manual interventions to reduce muscle tightness and pain. Pt able to report mild decrease in pain symptoms post session. Pt declined modalities to do at home. Pt to follow up with MD next week and was advised to alert MD of symptoms. Pt in agreement. Patient will continue to benefit from skilled PT services to modify and progress therapeutic interventions, address functional mobility deficits, address ROM deficits, address strength deficits, analyze and address soft tissue restrictions, analyze and cue movement patterns, analyze and modify body mechanics/ergonomics, assess and modify postural abnormalities, address imbalance/dizziness and instruct in home and community integration to attain remaining goals. []  See Plan of Care  []  See progress note/recertification  []  See Discharge Summary         Progress towards goals / Updated goals:  Goal: Pt to increase right shoulder A/AAROM in Flex, ABD to at least 120 deg without increased pain for ease with grooming and overhead reaching. Status at last note/certification: 4/2/92: AAROM Right shoulder flexion: 82 deg. Current: progressing - 7/21/22: PROM Right shoulder flexion: 100 deg (p!) abduction: 70 deg   After pec release: abduction/scaption: 90 deg  2. Goal: Pt to increase right shoulder ER ROM in scapular plane to 45 deg without increased pain for ease of reaching, ADLs when cleared by MD.  Status at last note/certification: pre manual: PROM: ER: 25 deg, post manual: ER: 28 deg; progressing  Current:  3. Goal: Pt to report < 5/10 pain at worst to increase ease with ADLs.   Status at last note/certification:  Pain at worst in the last week: 6/10; on average: 6-7/10   Current: not met - still 6-7/10 pain at worst (22)  4. Goal: Pt to report FOTO score of 52 pts to show improved function and quality of life.   Status at last note/certification:Progressin  Current: reassess at MD note (22)    PLAN  [x]  Upgrade activities as tolerated     []  Continue plan of care  []  Update interventions per flow sheet       []  Discharge due to:_  []  Other:_      Shalom Andre, PT 2022  1:42 PM    Future Appointments   Date Time Provider Diana Burton   2022  3:00 PM Robbie Loza [facility]   2022 10:45 AM [place], Zenia Boxer, PA [facility] AMB

## 2022-09-01 ENCOUNTER — HOSPITAL ENCOUNTER (OUTPATIENT)
Dept: PHYSICAL THERAPY | Age: 77
Discharge: HOME | End: 2022-09-01
Payer: MEDICARE

## 2022-09-01 PROCEDURE — 97110 THERAPEUTIC EXERCISES: CPT

## 2022-09-01 PROCEDURE — 97140 MANUAL THERAPY 1/> REGIONS: CPT

## 2022-09-01 NOTE — PROGRESS NOTES
PT DAILY TREATMENT NOTE     Patient Name: Scarlett Pinon  Date:2022  : 1945  [x]  Patient  Verified  Payor: Maya Shoulder / Plan: [place] [employer] / Product Type: Managed Care Medicare /    In time:2:30  Out time:3:10  Total Treatment Time (min): 40  Visit #: 3 of 10    Medicare/BCBS Only   Total Timed Codes (min):  40 1:1 Treatment Time:  40       Treatment Area: Pain in right shoulder [M25.511]  Closed fracture of head of right humerus, initial encounter [S42.291A]    SUBJECTIVE  Pain Level (0-10 scale): 4-5  Any medication changes, allergies to medications, adverse drug reactions, diagnosis change, or new procedure performed?: [x] No    [] Yes (see summary sheet for update)  Subjective functional status/changes:   [] No changes reported  The pain is a little better after last session when we backed off some if the ex's    OBJECTIVE      25 min Therapeutic Exercise:  [] See flow sheet :   Rationale: increase ROM and increase strength to improve the patients ability to perform grooming, ADL's, household tasks able       15 min Manual Therapy:  STM to right deltoid, pec major/minor, UT  with long axis traction and oscillations    The manual therapy interventions were performed at a separate and distinct time from the therapeutic activities interventions. Rationale: decrease pain, increase ROM, and increase tissue extensibility to improve UE function, ease of reaching and ADL's          With   [] TE   [] TA   [] neuro   [] other: Patient Education: [x] Review HEP    [] Progressed/Changed HEP based on:   [] positioning   [] body mechanics   [] transfers   [] heat/ice application    [] other:      Other Objective/Functional Measures: ex's per card     Pain Level (0-10 scale) post treatment: 4    ASSESSMENT/Changes in Function: modified program to reduce strain on shoulder and reduce pain provocation. Slight assist with eccentric lowering for supine AROM flexion.   Difficulty initiating supine flexion but once started, can perform slowly. Patient will continue to benefit from skilled PT services to modify and progress therapeutic interventions, address functional mobility deficits, address ROM deficits, address strength deficits, analyze and address soft tissue restrictions, analyze and cue movement patterns, analyze and modify body mechanics/ergonomics, assess and modify postural abnormalities, address imbalance/dizziness, and instruct in home and community integration to attain remaining goals. []  See Plan of Care  []  See progress note/recertification  []  See Discharge Summary         Progress towards goals / Updated goals:  Goal: Pt to increase right shoulder A/AAROM in Flex, ABD to at least 120 deg without increased pain for ease with grooming and overhead reaching. Status at last note/certification: 1/3/99: AAROM Right shoulder flexion: 82 deg. Current: progressing - 22: PROM Right shoulder flexion: 100 deg (p!) abduction: 70 deg   After pec release: abduction/scaption: 90 deg  2. Goal: Pt to increase right shoulder ER ROM in scapular plane to 45 deg without increased pain for ease of reaching, ADLs when cleared by MD.  Status at last note/certification: pre manual: PROM: ER: 25 deg, post manual: ER: 28 deg; progressing  Current:  3. Goal: Pt to report < 5/10 pain at worst to increase ease with ADLs. Status at last note/certification:  Pain at worst in the last week: 6/10; on average: 6-7/10   Current: not met - still 6-7/10 pain at worst (22)  4. Goal: Pt to report FOTO score of 52 pts to show improved function and quality of life.   Status at last note/certification:Progressin  Current: reassess at MD note (22)    PLAN  [x]  Upgrade activities as tolerated     []  Continue plan of care  []  Update interventions per flow sheet       []  Discharge due to:_  []  Other:_      Farmer Shoulders, PTA 2022  2:28 PM    Future Appointments   Date Time Provider Diana Burton   9/1/2022  3:00 PM Duc Brooks [facility]   9/6/2022 10:45 AM Rosmery Martinez PA [facility] AM[facility]

## 2022-09-02 ENCOUNTER — APPOINTMENT (OUTPATIENT)
Dept: PHYSICAL THERAPY | Age: 77
End: 2022-09-02
Payer: MEDICARE

## 2022-09-06 ENCOUNTER — TRANSCRIBE ORDER (OUTPATIENT)
Dept: SCHEDULING | Age: 77
End: 2022-09-06

## 2022-09-06 ENCOUNTER — OFFICE VISIT (OUTPATIENT)
Dept: ORTHOPEDIC SURGERY | Age: 77
End: 2022-09-06
Payer: MEDICARE

## 2022-09-06 VITALS
BODY MASS INDEX: 23.92 KG/M2 | OXYGEN SATURATION: 98 % | TEMPERATURE: 97.5 F | HEART RATE: 77 BPM | RESPIRATION RATE: 16 BRPM | WEIGHT: 130 LBS | HEIGHT: 62 IN

## 2022-09-06 DIAGNOSIS — M85.80 OSTEOPENIA: Primary | ICD-10-CM

## 2022-09-06 DIAGNOSIS — S42.291D CLOSED FRACTURE OF HEAD OF RIGHT HUMERUS WITH ROUTINE HEALING, SUBSEQUENT ENCOUNTER: Primary | ICD-10-CM

## 2022-09-06 DIAGNOSIS — Z78.0 ASYMPTOMATIC MENOPAUSAL STATE: ICD-10-CM

## 2022-09-06 DIAGNOSIS — N64.4 BREAST PAIN: Primary | ICD-10-CM

## 2022-09-06 PROCEDURE — G8536 NO DOC ELDER MAL SCRN: HCPCS | Performed by: PHYSICIAN ASSISTANT

## 2022-09-06 PROCEDURE — 1123F ACP DISCUSS/DSCN MKR DOCD: CPT | Performed by: PHYSICIAN ASSISTANT

## 2022-09-06 PROCEDURE — G8427 DOCREV CUR MEDS BY ELIG CLIN: HCPCS | Performed by: PHYSICIAN ASSISTANT

## 2022-09-06 PROCEDURE — G8420 CALC BMI NORM PARAMETERS: HCPCS | Performed by: PHYSICIAN ASSISTANT

## 2022-09-06 PROCEDURE — 99213 OFFICE O/P EST LOW 20 MIN: CPT | Performed by: PHYSICIAN ASSISTANT

## 2022-09-06 PROCEDURE — G8432 DEP SCR NOT DOC, RNG: HCPCS | Performed by: PHYSICIAN ASSISTANT

## 2022-09-06 PROCEDURE — 1101F PT FALLS ASSESS-DOCD LE1/YR: CPT | Performed by: PHYSICIAN ASSISTANT

## 2022-09-06 PROCEDURE — G8399 PT W/DXA RESULTS DOCUMENT: HCPCS | Performed by: PHYSICIAN ASSISTANT

## 2022-09-06 PROCEDURE — 1090F PRES/ABSN URINE INCON ASSESS: CPT | Performed by: PHYSICIAN ASSISTANT

## 2022-09-06 NOTE — PROGRESS NOTES
Caio Cedeno  1945     HISTORY OF PRESENT ILLNESS  Caio Cedeno is a 68 y.o. female who presents today for reevaluation s/p Right reverse total shoulder arthroplasty on 5/20/22. Patient has been going to PT. Describes pain as a 3/10. Overall doing better. She is very happy with her progress today. Patient denies any fever, chills, chest pain, shortness of breath or calf pain. The remainder of the review of systems is negative. There are no new illness or injuries to report since last seen in the office. No changes in medications, allergies, social or family history. PHYSICAL EXAM:   There were no vitals taken for this visit. The patient is a well-developed, well-nourished female in no acute distress. The patient is alert and oriented times three. The patient appears to be well groomed. Mood and affect are normal.  ORTHOPEDIC EXAM of right shoulder:  Inspection: swelling present,  Bruising not present  Incision well healed  Passive glenohumeral abduction 0-80 degrees  Stability: Stable  Strength: n/a  2+ distal pulses    IMAGING: XR of the right shoulder with 3 views obtained in the office dated 7/26/2022 was reviewed and read by myself reveals: reverse total shoulder arthroplasty in position. Fracture fragment in place. Slight  callus formation    3 view xray images of Right shoulder on 6/24/2022 read and reviewed by myself reveal reverse total shoulder arthroplasty in position. Fracture fragment in place. no callus formation     IMPRESSION:  S/P Right reverse total shoulder arthroplasty  Encounter Diagnosis   Name Primary? Closed fracture of head of right humerus with routine healing, subsequent encounter Yes         PLAN:   1. Patient continues to improve postoperatively. She will continue with physical therapy. Stressed no increases in pain. Risk factors include: n/a  2. No cortisone injection indicated today   3. yes Physical/Occupational Therapy indicated today  4. No diagnostic test indicated today:   5. No durable medical equipment indicated today  6. No referral indicated today   7. No medications indicated today:   8.  No Narcotic indicated today         RTC PRN        ERICA [phone]

## 2022-09-08 ENCOUNTER — HOSPITAL ENCOUNTER (OUTPATIENT)
Dept: PHYSICAL THERAPY | Age: 77
Discharge: HOME | End: 2022-09-08
Payer: MEDICARE

## 2022-09-08 PROCEDURE — 97112 NEUROMUSCULAR REEDUCATION: CPT

## 2022-09-08 PROCEDURE — 97110 THERAPEUTIC EXERCISES: CPT

## 2022-09-08 NOTE — PROGRESS NOTES
PT DAILY TREATMENT NOTE     Patient Name: Blanco Subramanian  Date:2022  : 1945  [x]  Patient  Verified  Payor: Xiao Lovelace / Plan: [place] [employer]AGE / Product Type: Managed Care Medicare /    In time:300  Out time:340  Total Treatment Time (min): 40  Visit #: 5 of 10    Medicare/BCBS Only   Total Timed Codes (min):  40 1:1 Treatment Time:  40       Treatment Area: Pain in right shoulder [M25.511]  Closed fracture of head of right humerus, initial encounter [S42.291A]    SUBJECTIVE  Pain Level (0-10 scale): 310  Any medication changes, allergies to medications, adverse drug reactions, diagnosis change, or new procedure performed?: [x] No    [] Yes (see summary sheet for update)  Subjective functional status/changes:   [] No changes reported  Pt reports no change since last session. OBJECTIVE    25 min Therapeutic Exercise:  [] See flow sheet :   Rationale: increase ROM, increase strength, improve coordination, and improve balance to improve the patients ability to tolerate functional mobility and daily routine. 15 min Neuromuscular Re-education:  []  See flow sheet :   Rationale: increase strength, improve coordination, improve balance, and increase proprioception  to improve the patients ability to tolerate postural correction and functional mobility. With   [] TE   [] TA   [] neuro   [] other: Patient Education: [x] Review HEP    [] Progressed/Changed HEP based on:   [] positioning   [] body mechanics   [] transfers   [] heat/ice application    [] other:         Pain Level (0-10 scale) post treatment: 4/10    ASSESSMENT/Changes in Function: Pt seen by PT to address right shoulder pain, strength, postural correction with functional mobility. Pt with good tolerance of the session with minimal to no lasting increase in pain or discomfort. Pt challenged with shoulder flexion secondary to weakness and decreased joint mobility.  PT provided intermittent verbal/tactile cues for optimal form and alignment. Patient will continue to benefit from skilled PT services to modify and progress therapeutic interventions, address functional mobility deficits, address ROM deficits, address strength deficits, analyze and address soft tissue restrictions, analyze and cue movement patterns, analyze and modify body mechanics/ergonomics, assess and modify postural abnormalities, address imbalance/dizziness, and instruct in home and community integration to attain remaining goals. []  See Plan of Care  []  See progress note/recertification  []  See Discharge Summary         Progress towards goals / Updated goals:  Goal: Pt to increase right shoulder A/AAROM in Flex, ABD to at least 120 deg without increased pain for ease with grooming and overhead reaching. Status at last note/certification: 13: AAROM Right shoulder flexion: 82 deg. Current: progressing - 22: PROM Right shoulder flexion: 100 deg (p!) abduction: 70 deg   After pec release: abduction/scaption: 90 deg  2. Goal: Pt to increase right shoulder ER ROM in scapular plane to 45 deg without increased pain for ease of reaching, ADLs when cleared by MD.  Status at last note/certification: pre manual: PROM: ER: 25 deg, post manual: ER: 28 deg; progressing  Current:  3. Goal: Pt to report < 5/10 pain at worst to increase ease with ADLs. Status at last note/certification:  Pain at worst in the last week: 6/10; on average: 6-7/10   Current: not met - still 6-7/10 pain at worst (22)  4. Goal: Pt to report FOTO score of 52 pts to show improved function and quality of life.   Status at last note/certification:Progressin  Current: reassess at MD note (22)     PLAN  [x]  Upgrade activities as tolerated     [x]  Continue plan of care  []  Update interventions per flow sheet       []  Discharge due to:_  []  Other:_      Monica Baptiste, PT 2022  3:30 PM    Future Appointments   Date Time Provider Diana Josephine   9/12/2022  1:30 PM Trevino Ohms, [facility]   9/15/2022  1:30 PM Trevino Ohms, [facility]   9/19/2022  1:30 PM Trevino Ohms, [facility]   9/22/2022  1:30 PM Trevino Ohms, [facility]   9/26/2022  1:30 PM [facility]   9/29/2022  1:30 PM Trevino Ohms, [facility]

## 2022-09-12 ENCOUNTER — HOSPITAL ENCOUNTER (OUTPATIENT)
Dept: PHYSICAL THERAPY | Age: 77
Discharge: HOME | End: 2022-09-12
Payer: MEDICARE

## 2022-09-12 NOTE — PROGRESS NOTES
PT DAILY TREATMENT NOTE     Patient Name: Dena Herrmann  Date:2022  : 1945  [x]  Patient  Verified  Payor: Bridger Mathews / Plan: [place] [employer]AGE / Product Type: Managed Care Medicare /    In time:130  Out time:210  Total Treatment Time (min): 40  Visit #: 6 of 10    Medicare/BCBS Only   Total Timed Codes (min):  40 1:1 Treatment Time:  40       Treatment Area: Pain in right shoulder [M25.511]  Closed fracture of head of right humerus, initial encounter [S42.291A]    SUBJECTIVE  Pain Level (0-10 scale): 3/10  Any medication changes, allergies to medications, adverse drug reactions, diagnosis change, or new procedure performed?: [x] No    [] Yes (see summary sheet for update)  Subjective functional status/changes:   [] No changes reported  Pt reports no change since last session. OBJECTIVE      15 min Therapeutic Exercise:  [] See flow sheet :   Rationale: increase ROM, increase strength, improve coordination, and improve balance to improve the patients ability to tolerate functional mobility and daily routine      15 min Neuromuscular Re-education:  []  See flow sheet :   Rationale: increase strength, improve coordination, improve balance, and increase proprioception  to improve the patients ability to tolerate postural correction with functional mobility. 10 min Manual Therapy:  STM to right deltoid, pec major/minor, UT    The manual therapy interventions were performed at a separate and distinct time from the therapeutic activities interventions.   Rationale: decrease pain, increase ROM, and increase tissue extensibility to improve UE function, ease of reaching and ADL's        With   [] TE   [] TA   [] neuro   [] other: Patient Education: [x] Review HEP    [] Progressed/Changed HEP based on:   [] positioning   [] body mechanics   [] transfers   [] heat/ice application    [] other:      Pain Level (0-10 scale) post treatment: 3/10    ASSESSMENT/Changes in Function: Pt seen by PT to address right shoulder pain, ROM, strength, and postural correction with functional mobility. Pt with good tolerance of the session with minimal to no lasting increase in pain or discomfort. Pt challenged with scaption, exercises with increased pain with lowering arm. PT provided intermittent verbal/tactile cues for optimal form and alignment. Patient will continue to benefit from skilled PT services to modify and progress therapeutic interventions, address functional mobility deficits, address ROM deficits, address strength deficits, analyze and address soft tissue restrictions, analyze and cue movement patterns, analyze and modify body mechanics/ergonomics, assess and modify postural abnormalities, address imbalance/dizziness, and instruct in home and community integration to attain remaining goals. []  See Plan of Care  []  See progress note/recertification  []  See Discharge Summary         Progress towards goals / Updated goals:  Goal: Pt to increase right shoulder A/AAROM in Flex, ABD to at least 120 deg without increased pain for ease with grooming and overhead reaching. Status at last note/certification: 7/5/43: AAROM Right shoulder flexion: 82 deg. Current: progressing - 7/21/22: PROM Right shoulder flexion: 100 deg (p!) abduction: 70 deg   After pec release: abduction/scaption: 90 deg  2. Goal: Pt to increase right shoulder ER ROM in scapular plane to 45 deg without increased pain for ease of reaching, ADLs when cleared by MD.  Status at last note/certification: pre manual: PROM: ER: 25 deg, post manual: ER: 28 deg; progressing  Current:  3. Goal: Pt to report < 5/10 pain at worst to increase ease with ADLs. Status at last note/certification:  Pain at worst in the last week: 6/10; on average: 6-7/10   Current: not met - still 6-7/10 pain at worst (8/29/22)  4. Goal: Pt to report FOTO score of 52 pts to show improved function and quality of life.   Status at last note/certification:Progressin  Current: reassess at MD note (22)       PLAN  [x]  Upgrade activities as tolerated     [x]  Continue plan of care  []  Update interventions per flow sheet       []  Discharge due to:_  []  Other:_      Hero Matthews, PT 2022  1:38 PM    Future Appointments   Date Time Provider Diana Burton   9/15/2022  1:30 PM Ines Leiva [facility] [address]   2022  1:30 PM Ines Leiva, [facility] [address]   2022  1:30 PM Ines Leiva [facility] [address]   2022  1:30 PM Lizz Jasmine [facility] [address]   2022  1:30 PM Ines Leiva [facility] [address]

## 2022-09-15 ENCOUNTER — HOSPITAL ENCOUNTER (OUTPATIENT)
Dept: PHYSICAL THERAPY | Age: 77
Discharge: HOME | End: 2022-09-15
Payer: MEDICARE

## 2022-09-15 PROCEDURE — 97112 NEUROMUSCULAR REEDUCATION: CPT

## 2022-09-15 PROCEDURE — 97140 MANUAL THERAPY 1/> REGIONS: CPT

## 2022-09-15 PROCEDURE — 97110 THERAPEUTIC EXERCISES: CPT

## 2022-09-15 NOTE — PROGRESS NOTES
PT DAILY TREATMENT NOTE     Patient Name: Parul Key  Date:9/15/2022  : 1945  [x]  Patient  Verified  Payor: Amy Millan / Plan: [place] [employer]ICARE ADVANTAGE / Product Type: Managed Care Medicare /    In time:134  Out time:227  Total Treatment Time (min): 48  Visit #: 7 of 10    Medicare/BCBS Only   Total Timed Codes (min):  53 1:1 Treatment Time:  48       Treatment Area: Pain in right shoulder [M25.511]  Closed fracture of head of right humerus, initial encounter [S42.291A]    SUBJECTIVE  Pain Level (0-10 scale): 2/10  Any medication changes, allergies to medications, adverse drug reactions, diagnosis change, or new procedure performed?: [x] No    [] Yes (see summary sheet for update)  Subjective functional status/changes:   [] No changes reported  Pt wants to continue with PT.     OBJECTIVE    18 min Therapeutic Exercise:  [] See flow sheet :   Rationale: increase ROM, increase strength, improve coordination, and improve balance to improve the patients ability to tolerate functional mobility and daily routine. 10 min Neuromuscular Re-education:  []  See flow sheet :   Rationale: increase strength, improve coordination, improve balance, and increase proprioception  to improve the patients ability to tolerate scapular muscle activation for ability to tolerate postural correction with functional mobility. 25 min Manual Therapy:  STM/TPR to right subscapularis muscle, biceps muscle, upper trap region, scapular mobilizations, levator scap region. The manual therapy interventions were performed at a separate and distinct time from the therapeutic activities interventions.   Rationale: decrease pain, increase ROM, increase tissue extensibility, decrease trigger points, and increase postural awareness to tolerate functional mobility    With   [] TE   [] TA   [] neuro   [] other: Patient Education: [x] Review HEP    [] Progressed/Changed HEP based on:   [] positioning   [] body mechanics   [] transfers   [] heat/ice application    [] other:      Other Objective/Functional Measures: Reassessed goals. Pain Level (0-10 scale) post treatment: 2/10    ASSESSMENT/Changes in Function: Pt has attended skilled therapy 22 and has made good progress thus far. Pt reports the following:    Functional Gains: sleeping, doing hair, Getting dressed, walking with arm relaxed,   Functional Deficits: reaching back or behind, carrying weighted items, reaching overhead, driving  % improvement: 75%  Pain   Average: 3/10       Best: 2/10     Worst: 4/10  Patient Goal: \" I want to be able to reach the shelves and drive\"   Pt would benefit from continued skilled therapy to address ROM, strength, and functional mobility. Patient will continue to benefit from skilled PT services to modify and progress therapeutic interventions, address functional mobility deficits, address ROM deficits, address strength deficits, analyze and address soft tissue restrictions, analyze and cue movement patterns, analyze and modify body mechanics/ergonomics, assess and modify postural abnormalities, address imbalance/dizziness, and instruct in home and community integration to attain remaining goals. [x]  See Plan of Care  []  See progress note/recertification  []  See Discharge Summary         Progress towards goals / Updated goals:  Goal: Pt to increase right shoulder A/AAROM in Flex, ABD to at least 120 deg without increased pain for ease with grooming and overhead reaching. Status at last note/certification: Progressing: right shoulder flex: 108 deg. Abd: 75 deg: (9/15/22)     Goal: Pt to increase right shoulder ER ROM in scapular plane to 45 deg without increased pain for ease of reaching, ADLs when cleared by MD.  Status at last note/certification: Progressing: ER: 40 deg (9/15/22)    Goal: Pt to report FOTO score of 52 pts to show improved function and quality of life.   Status at last note/certification:Progressing: FOTO 41 (9/15/22)     Goal: Pt to place a 5lb weight on a shelf overhead for improved tolerance of household chores.    Status at last note/certification:Unable to perform      PLAN  []  Upgrade activities as tolerated     []  Continue plan of care  []  Update interventions per flow sheet       []  Discharge due to:_  []  Other:_      [place], PT 9/15/2022  2:03 PM    Future Appointments   Date Time Provider Diana Burton   9/19/2022  1:30 PM Boyd Gould, PT [facility]   9/22/2022  1:30 PM Boyd Gould PT [facility]   9/26/2022  1:30 PM Stefany Pizano [employer] [facility]   9/29/2022  1:30 PM Boyd Gould, PT [facility]

## 2022-09-15 NOTE — PROGRESS NOTES
[facility] - [facility], [address]  [phone] fax    Continued Plan of Care/ Re-certification for Physical Therapy Services      Patient name: Austin Dorado Start of Care: 22   Referral source: Sherice Gandhi : 1945   Medical/Treatment Diagnosis: Pain in right shoulder [M25.511]  Closed fracture of head of right humerus, initial encounter [S42.323R]  Payor: Cathy Sessions / Plan: [address] [place] [address] / Product Type: Managed Care Medicare /  Onset Date:22(surgery)     Prior Hospitalization: see medical history Provider#: [id number]   Medications: Verified on Patient Summary List    Comorbidities:  Arthritis; Back pain; Headaches; Thyroid problems  Prior Level of Function:Retired; lives in studio apartment alone; manages home independently; walking program for exercise, completes stained glass projects    Visits from Start of Care: 22    Missed Visits: 2    The Plan of Care and following information is based on the patient's current status:  Goal: Pt to increase right shoulder A/AAROM in Flex, ABD to at least 120 deg without increased pain for ease with grooming and overhead reaching. Status at last note/certification: progressing - 22: PROM Right shoulder flexion: 100 deg (p!) abduction: 70 deg; After pec release: abduction/scaption: 90 deg  Current: Progressing: right shoulder flex: 108 deg. Abd: 75 deg: (9/15/22)     2. Goal: Pt to increase right shoulder ER ROM in scapular plane to 45 deg without increased pain for ease of reaching, ADLs when cleared by MD.  Status at last note/certification: pre manual: PROM: ER: 25 deg, post manual: ER: 28 deg; progressing  Current:Progressin deg ER (9/15/22)     3. Goal: Pt to report < 5/10 pain at worst to increase ease with ADLs.   Status at last note/certification:  not met - still 6-7/10 pain at worst (22)  Current: met: pt reports 4/10 at worst (9/15/22)  4. Goal: Pt to report FOTO score of 52 pts to show improved function and quality of life. Status at last note/certification:Progressin  Current: Progressing: FOTO 41 (9/15/22)     Problems/ barriers to goal attainment: none     Problem List: pain affecting function, decrease ROM, decrease strength, edema affecting function, impaired gait/ balance, decrease ADL/ functional abilitiies, decrease activity tolerance, decrease flexibility/ joint mobility, and decrease transfer abilities    Treatment Plan: Therapeutic exercise, Therapeutic activities, Neuromuscular re-education, Physical agent/modality, Gait/balance training, Manual therapy, Aquatic therapy, Patient education, Self Care training, Functional mobility training, Home safety training, and Stair training     Patient Goal (s) has been updated and includes: \"I want to be able to reach the shelves and drive\"     Goals for this certification period to be accomplished in 5 weeks:  Goal: Pt to increase right shoulder A/AAROM in Flex, ABD to at least 120 deg without increased pain for ease with grooming and overhead reaching. Status at last note/certification: Progressing: right shoulder flex: 108 deg. Abd: 75 deg: (9/15/22)     Goal: Pt to increase right shoulder ER ROM in scapular plane to 45 deg without increased pain for ease of reaching, ADLs when cleared by MD.  Status at last note/certification: Progressing: ER: 40 deg (9/15/22)    Goal: Pt to report FOTO score of 52 pts to show improved function and quality of life. Status at last note/certification:Progressing: FOTO 41 (9/15/22)     Goal: Pt to place a 5lb weight on a shelf overhead for improved tolerance of household chores. Status at last note/certification:Unable to perform    Frequency / Duration: Patient to be seen 2 times per week for 5 weeks:    Assessment / Recommendations:Pt has attended skilled therapy 22 and has made good progress thus far.   Pt reports the following: Functional Gains: sleeping, doing hair, Getting dressed, walking with arm relaxed,   Functional Deficits: reaching back or behind, carrying weighted items, reaching overhead, driving  % improvement: 75%  Pain   Average: 3/10       Best: 2/10     Worst: 4/10  Patient Goal: \" I want to be able to reach the shelves and drive\"   Pt would benefit from continued skilled therapy to address ROM, strength, and functional mobility. Certification Period: 9/15/22-10/13/22    Zuly Mclean, PT 9/15/2022 1:36 PM    ________________________________________________________________________  I certify that the above Therapy Services are being furnished while the patient is under my care. I agree with the treatment plan and certify that this therapy is necessary. [] I have read the above and request that my patient continue as recommended.   [] I have read the above report and request that my patient continue therapy with the following changes/special instructions: ______________________________________  [] I have read the above report and request that my patient be discharged from therapy    Physician's Signature:____________Date:_________TIME:________     MAICO Hooks  ** Signature, Date and Time must be completed for valid certification **    Please sign and return to [facility] - [address]  [phone] fax

## 2022-09-19 ENCOUNTER — HOSPITAL ENCOUNTER (OUTPATIENT)
Dept: PHYSICAL THERAPY | Age: 77
Discharge: HOME | End: 2022-09-19
Payer: MEDICARE

## 2022-09-19 ENCOUNTER — TRANSCRIBE ORDER (OUTPATIENT)
Dept: SCHEDULING | Age: 77
End: 2022-09-19

## 2022-09-19 DIAGNOSIS — N63.0 BREAST MASS: ICD-10-CM

## 2022-09-19 DIAGNOSIS — N64.4 PAIN IN BREAST: Primary | ICD-10-CM

## 2022-09-19 PROCEDURE — 97140 MANUAL THERAPY 1/> REGIONS: CPT

## 2022-09-19 PROCEDURE — 97110 THERAPEUTIC EXERCISES: CPT

## 2022-09-19 NOTE — PROGRESS NOTES
PT DAILY TREATMENT NOTE     Patient Name: Austin Dorado  Date:2022  : 1945  [x]  Patient  Verified  Payor: Cathy Sessions / Plan: David Doyle / Product Type: Managed Care Medicare /    In time:1:32  Out time:2:15  Total Treatment Time (min): 43  Visit #: 1 of 10    Medicare/BCBS Only   Total Timed Codes (min):   1:1 Treatment Time:         Treatment Area: Pain in right shoulder [M25.511]  Closed fracture of head of right humerus, initial encounter [S42.291A]    SUBJECTIVE  Pain Level (0-10 scale): 4  Any medication changes, allergies to medications, adverse drug reactions, diagnosis change, or new procedure performed?: [x] No    [] Yes (see summary sheet for update)  Subjective functional status/changes:   [] No changes reported  My right bicep is very sore. OBJECTIVE    30 min Therapeutic Exercise:  [] See flow sheet :   Rationale: increase ROM and increase strength to improve the patients ability to reach overhead. 13 min Manual Therapy:  Supine: left passive shoulder abduction/ flexion. STM the the left anterior/ medial deltoid, left biceps. Subscapular release. TPR to the teres major/minor. The manual therapy interventions were performed at a separate and distinct time from the therapeutic activities interventions. Rationale: decrease pain, increase ROM, increase tissue extensibility, decrease trigger points, and increase postural awareness to improve functional UE ROM. With   [x] TE   [] TA   [] neuro   [] other: Patient Education: [x] Review HEP    [] Progressed/Changed HEP based on:   [] positioning   [] body mechanics   [] transfers   [] heat/ice application    [] other:      Other Objective/Functional Measures: exercises per chart. Pain Level (0-10 scale) post treatment: 3    ASSESSMENT/Changes in Function: Pt reports to skilled therapy session with decreased functional strength/ ROM in the right UE.  Pt was able to tolerate serratus punches with a 1 pound weight the engage the serratus anterior and improve scapulohumeral rhythm. Tenderness and taut bands were palpable in the right teres major/ minor and anterior deltoid, but  noted improvement as manual progressed. Tactile cues were provided during ER/IR walkouts to engage the supraspinatus and improve ease with reaching overhead. Session tolerated well with no increases in pain. All post treatment modalities were declined. Patient will continue to benefit from skilled PT services to modify and progress therapeutic interventions, address functional mobility deficits, address ROM deficits, address strength deficits, analyze and address soft tissue restrictions, analyze and cue movement patterns, analyze and modify body mechanics/ergonomics, and assess and modify postural abnormalities to attain remaining goals. [x]  See Plan of Care  []  See progress note/recertification  []  See Discharge Summary         Progress towards goals / Updated goals:  Goal: Pt to increase right shoulder A/AAROM in Flex, ABD to at least 120 deg without increased pain for ease with grooming and overhead reaching. Status at last note/certification: Progressing: right shoulder flex: 108 deg. Abd: 75 deg: (9/15/22)      Goal: Pt to increase right shoulder ER ROM in scapular plane to 45 deg without increased pain for ease of reaching, ADLs when cleared by MD.  Status at last note/certification: Progressing: ER: 40 deg (9/15/22)     Goal: Pt to report FOTO score of 52 pts to show improved function and quality of life. Status at last note/certification:Progressing: FOTO 41 (9/15/22)      Goal: Pt to place a 5lb weight on a shelf overhead for improved tolerance of household chores.    Status at last note/certification:Unable to perform    PLAN  [x]  Upgrade activities as tolerated     [x]  Continue plan of care  []  Update interventions per flow sheet       []  Discharge due to:_  []  Other:_      Clyde Son, PTA 9/19/2022  1:34 PM    Future Appointments   Date Time Provider Diana Burton   9/22/2022  1:30 PM Yanelis Sellers [facility]   9/26/2022  1:30 PM Cass Orourke [facility]   9/29/2022  1:30 PM Yanelis Sellers PT [facility]   10/7/2022  1:15 PM [facility] RM 3 3D [facility]   10/7/2022  1:45 PM [facility] US RM 1 [facility]

## 2022-09-20 ENCOUNTER — TELEPHONE (OUTPATIENT)
Dept: PHYSICAL THERAPY | Age: 77
End: 2022-09-20

## 2022-09-22 ENCOUNTER — APPOINTMENT (OUTPATIENT)
Dept: PHYSICAL THERAPY | Age: 77
End: 2022-09-22
Payer: MEDICARE

## 2022-09-23 ENCOUNTER — HOSPITAL ENCOUNTER (OUTPATIENT)
Dept: PHYSICAL THERAPY | Age: 77
Discharge: HOME | End: 2022-09-23
Payer: MEDICARE

## 2022-09-23 PROCEDURE — 97110 THERAPEUTIC EXERCISES: CPT

## 2022-09-23 PROCEDURE — 97140 MANUAL THERAPY 1/> REGIONS: CPT

## 2022-09-23 NOTE — PROGRESS NOTES
PT DAILY TREATMENT NOTE     Patient Name: Marko Body  Date:2022  : 1945  [x]  Patient  Verified  Payor: Dalila Way / Plan: [employer] / Product Type: Managed Care Medicare /    In time:300  Out time:340  Total Treatment Time (min): 40  Visit #: 2 of 10    Medicare/BCBS Only   Total Timed Codes (min):  40 1:1 Treatment Time:  40       Treatment Area: Pain in right shoulder [M25.511]  Closed fracture of head of right humerus, initial encounter [S42.291A]    SUBJECTIVE  Pain Level (0-10 scale): 1/10 (sitting still)  Any medication changes, allergies to medications, adverse drug reactions, diagnosis change, or new procedure performed?: [x] No    [] Yes (see summary sheet for update)  Subjective functional status/changes:   [] No changes reported  Pt reports she has been able to do a little more. OBJECTIVE    25 min Therapeutic Exercise:  [] See flow sheet :   Rationale: increase ROM, increase strength, improve coordination, and improve balance to improve the patients ability to tolerate functional mobility and daily routine. 15 min Manual Therapy:  STM/TPR to upper trap, and deltoid region   The manual therapy interventions were performed at a separate and distinct time from the therapeutic activities interventions. Rationale: decrease pain, increase ROM, increase tissue extensibility, decrease trigger points, and increase postural awareness to tolerate functional mobility and daily routine. With   [] TE   [] TA   [] neuro   [] other: Patient Education: [x] Review HEP    [] Progressed/Changed HEP based on:   [] positioning   [] body mechanics   [] transfers   [] heat/ice application    [] other:      Pain Level (0-10 scale) post treatment: 0/10    ASSESSMENT/Changes in Function: Pt seen by PT to address right shoulder pain, strength, ROM, and postural correction with functional mobility.  Pt with good tolerance of the session with minimal to no lasting increase in pain or discomfort. Pt challenged with shoulder flexion and external rotation. PT provided intermittent verbal/tactile cues for optimal form and alignment. Patient will continue to benefit from skilled PT services to modify and progress therapeutic interventions, address functional mobility deficits, address ROM deficits, address strength deficits, analyze and address soft tissue restrictions, analyze and cue movement patterns, analyze and modify body mechanics/ergonomics, assess and modify postural abnormalities, address imbalance/dizziness, and instruct in home and community integration to attain remaining goals. []  See Plan of Care  []  See progress note/recertification  []  See Discharge Summary         Progress towards goals / Updated goals:  Goal: Pt to increase right shoulder A/AAROM in Flex, ABD to at least 120 deg without increased pain for ease with grooming and overhead reaching. Status at last note/certification: : AAROM Right shoulder flexion: 82 deg. Current: progressing - 22: PROM Right shoulder flexion: 100 deg (p!) abduction: 70 deg   After pec release: abduction/scaption: 90 deg  2. Goal: Pt to increase right shoulder ER ROM in scapular plane to 45 deg without increased pain for ease of reaching, ADLs when cleared by MD.  Status at last note/certification: pre manual: PROM: ER: 25 deg, post manual: ER: 28 deg; progressing  Current:  3. Goal: Pt to report < 5/10 pain at worst to increase ease with ADLs. Status at last note/certification:  Pain at worst in the last week: 6/10; on average: 6-7/10   Current: not met - still 6-7/10 pain at worst (22)  4. Goal: Pt to report FOTO score of 52 pts to show improved function and quality of life.   Status at last note/certification:Progressin  Current: reassess at [place] note (22)       PLAN  [x]  Upgrade activities as tolerated     [x]  Continue plan of care  []  Update interventions per flow sheet []  Discharge due to:_  []  Other:_      Zuly Mclean, PT 9/23/2022  3:15 PM    Future Appointments   Date Time Provider Diana Burton   9/26/2022  1:30 PM Stanley [facility]   9/29/2022  1:30 PM Rickie Cardona PT [facility]   10/7/2022  1:15 PM [facility] RM 3 3D [facility]   10/7/2022  1:45 PM [facility] US RM 1 [facility]

## 2022-09-26 ENCOUNTER — HOSPITAL ENCOUNTER (OUTPATIENT)
Dept: PHYSICAL THERAPY | Age: 77
Discharge: HOME | End: 2022-09-26
Payer: MEDICARE

## 2022-09-26 PROCEDURE — 97140 MANUAL THERAPY 1/> REGIONS: CPT

## 2022-09-26 PROCEDURE — 97110 THERAPEUTIC EXERCISES: CPT

## 2022-09-26 NOTE — PROGRESS NOTES
PT DAILY TREATMENT NOTE     Patient Name: Alfa Mueller  Date:2022  : 1945  [x]  Patient  Verified  Payor: Nima Master / [facility] / Product Type: Managed Care Medicare /    In time:1:35  Out time:2:15  Total Treatment Time (min): 40  Visit #: 3 of 10    Medicare/BCBS Only   Total Timed Codes (min):  40 1:1 Treatment Time:  40       Treatment Area: Pain in right shoulder [M25.511]  Closed fracture of head of right humerus, initial encounter [S42.291A]    SUBJECTIVE  Pain Level (0-10 scale): 0  Any medication changes, allergies to medications, adverse drug reactions, diagnosis change, or new procedure performed?: [x] No    [] Yes (see summary sheet for update)  Subjective functional status/changes:   [] No changes reported  I'm still driving short distances, trying to get more comfortable for my trip to [place] next month    OBJECTIVE      30 min Therapeutic Exercise:  [] See flow sheet :   Rationale: increase ROM and increase strength to improve the patients ability to perform grooming, ADL's       10 min Manual Therapy:  STM to right deltoid, biceps. Manual stretching into flexion,scaption   The manual therapy interventions were performed at a separate and distinct time from the therapeutic activities interventions.   Rationale: increase ROM and increase tissue extensibility to improve ease of UE  function, driving, grooming          With   [] TE   [] TA   [] neuro   [] other: Patient Education: [x] Review HEP    [] Progressed/Changed HEP based on:   [] positioning   [] body mechanics   [] transfers   [] heat/ice application    [] other:      Other Objective/Functional Measures: ex's per card     Pain Level (0-10 scale) post treatment: 0    ASSESSMENT/Changes in Function: pt is demonstrates impoved functional use of UE as she reports less diffuclty with driving, partly due to confidence, but able to use UE to turn steering wheel with left assisting    Patient will continue to benefit from skilled PT services to modify and progress therapeutic interventions, address functional mobility deficits, address ROM deficits, address strength deficits, analyze and address soft tissue restrictions, analyze and cue movement patterns, analyze and modify body mechanics/ergonomics, assess and modify postural abnormalities, address imbalance/dizziness, and instruct in home and community integration to attain remaining goals. []  See Plan of Care  []  See progress note/recertification  []  See Discharge Summary         Progress towards goals / Updated goals:  Goal: Pt to increase right shoulder A/AAROM in Flex, ABD to at least 120 deg without increased pain for ease with grooming and overhead reaching. Status at last note/certification: 14: AAROM Right shoulder flexion: 82 deg. Current: progressing - 22: PROM Right shoulder flexion: 100 deg (p!) abduction: 70 deg   After pec release: abduction/scaption: 90 deg  2. Goal: Pt to increase right shoulder ER ROM in scapular plane to 45 deg without increased pain for ease of reaching, ADLs when cleared by MD.  Status at last note/certification: pre manual: PROM: ER: 25 deg, post manual: ER: 28 deg; progressing  Current:  3. Goal: Pt to report < 5/10 pain at worst to increase ease with ADLs. Status at last note/certification:  Pain at worst in the last week: 6/10; on average: 6-7/10   Current: not met - still 6-7/10 pain at worst (22)  4. Goal: Pt to report FOTO score of 52 pts to show improved function and quality of life.   Status at last note/certification:Progressin  Current: reassess at [place] note (22)    PLAN  []  Upgrade activities as tolerated     []  Continue plan of care  []  Update interventions per flow sheet       []  Discharge due to:_  []  Other:_      Catrachito Slaughter, PTA 2022  1:38 PM    Future Appointments   Date Time Provider Diana Burton   2022  1:30 PM Rachna Rod, PT [facility] 10/7/2022  1:15 PM [facility] RM 3 3D [facility]   10/7/2022  1:45 PM [facility] US RM 1 [facility]

## 2022-09-29 ENCOUNTER — HOSPITAL ENCOUNTER (OUTPATIENT)
Dept: PHYSICAL THERAPY | Age: 77
Discharge: HOME | End: 2022-09-29
Payer: MEDICARE

## 2022-09-29 PROCEDURE — 97110 THERAPEUTIC EXERCISES: CPT

## 2022-09-29 PROCEDURE — 97140 MANUAL THERAPY 1/> REGIONS: CPT

## 2022-09-29 NOTE — PROGRESS NOTES
PT DAILY TREATMENT NOTE     Patient Name: Jeanette Ramos  Date:2022  : 1945  [x]  Patient  Verified  Payor: Glenn Loaiza / Plan: [employer] / Product Type: Managed Care Medicare /    In time:132  Out time:215  Total Treatment Time (min): 43  Visit #: 4 of 10    Medicare/BCBS Only   Total Timed Codes (min):  43 1:1 Treatment Time:  43       Treatment Area: Pain in right shoulder [M25.511]  Closed fracture of head of right humerus, initial encounter [S42.291A]    SUBJECTIVE  Pain Level (0-10 scale): 2/10  Any medication changes, allergies to medications, adverse drug reactions, diagnosis change, or new procedure performed?: [x] No    [] Yes (see summary sheet for update)  Subjective functional status/changes:   [] No changes reported  Pt reports no change since last session. OBJECTIVE    20 min Therapeutic Exercise:  [] See flow sheet :   Rationale: increase ROM, increase strength, improve coordination, and improve balance to improve the patients ability to tolerate functional mobility and daily routine     23 min Manual Therapy:  STM/TPR to left infraspinatus, subscapularis, biceps, and deltoid region, Scapular mobilization in all directions and gentle PROM of right shoulder into flexion with distraction. The manual therapy interventions were performed at a separate and distinct time from the therapeutic activities interventions. Rationale: decrease pain, increase ROM, increase tissue extensibility, decrease trigger points, and increase postural awareness to tolerate functional mobility and daily routine.              With   [] TE   [] TA   [] neuro   [] other: Patient Education: [x] Review HEP    [] Progressed/Changed HEP based on:   [] positioning   [] body mechanics   [] transfers   [] heat/ice application    [] other:        Pain Level (0-10 scale) post treatment: 3/10    ASSESSMENT/Changes in Function: Pt seen by PT to address right shoulder pain, strength, ROM, and functional mobility. Pt with good tolerance of the session with minimal to no lasting increase in pain or discomfort. Ani Stands Pt challenged with shoulder flexion with decreased ROM noted with wall slides however pt with improved ROM following pulleys exercises. PT provided intermittent verbal/tactile cues for optimal form and alignment. Patient will continue to benefit from skilled PT services to modify and progress therapeutic interventions, address functional mobility deficits, address ROM deficits, address strength deficits, analyze and address soft tissue restrictions, analyze and cue movement patterns, analyze and modify body mechanics/ergonomics, assess and modify postural abnormalities, address imbalance/dizziness, and instruct in home and community integration to attain remaining goals. []  See Plan of Care  []  See progress note/recertification  []  See Discharge Summary         Progress towards goals / Updated goals:  Goal: Pt to increase right shoulder A/AAROM in Flex, ABD to at least 120 deg without increased pain for ease with grooming and overhead reaching. Status at last note/certification: 0/9/59: AAROM Right shoulder flexion: 82 deg. Current: progressing - 7/21/22: PROM Right shoulder flexion: 100 deg (p!) abduction: 70 deg   After pec release: abduction/scaption: 90 deg  2. Goal: Pt to increase right shoulder ER ROM in scapular plane to 45 deg without increased pain for ease of reaching, ADLs when cleared by MD.  Status at last note/certification: pre manual: PROM: ER: 25 deg, post manual: ER: 28 deg; progressing  Current:  3. Goal: Pt to report < 5/10 pain at worst to increase ease with ADLs. Status at last note/certification:  Pain at worst in the last week: 6/10; on average: 6-7/10   Current: not met - still 6-7/10 pain at worst (8/29/22)  4. Goal: Pt to report FOTO score of 52 pts to show improved function and quality of life.   Status at last note/certification:Progressing: 40  Current: reassess at [place] note (8/29/22)       PLAN  [x]  Upgrade activities as tolerated     [x]  Continue plan of care  []  Update interventions per flow sheet       []  Discharge due to:_  []  Other:_      Abida Hendrix, PT 9/29/2022  1:34 PM    Future Appointments   Date Time Provider Diana Burton   10/6/2022  1:30 PM Imelda Rivera, [facility] [address]   10/7/2022  1:15 PM [facility] RM 3 3D [facility]   10/7/2022  1:45 PM [facility] US RM 1 [facility]   10/10/2022  1:30 PM Tariq Maria [facility] [address]   10/13/2022  1:30 PM Yolanda Andre, [facility] [address]   10/17/2022  1:30 PM Yolanda Andre, [facility] [address]   10/20/2022  1:30 PM Imelda Rivera, [facility] [address]   10/24/2022  1:30 PM Yolanda Andre, [facility] [address]   10/27/2022  1:30 PM Yolanda Andre, [facility] [address]

## 2022-10-03 ENCOUNTER — APPOINTMENT (OUTPATIENT)
Dept: PHYSICAL THERAPY | Age: 77
End: 2022-10-03
Payer: MEDICARE

## 2022-10-06 ENCOUNTER — TELEPHONE (OUTPATIENT)
Dept: PHYSICAL THERAPY | Age: 77
End: 2022-10-06

## 2022-10-06 ENCOUNTER — APPOINTMENT (OUTPATIENT)
Dept: PHYSICAL THERAPY | Age: 77
End: 2022-10-06
Payer: MEDICARE

## 2022-10-07 ENCOUNTER — HOSPITAL ENCOUNTER (OUTPATIENT)
Dept: MAMMOGRAPHY | Age: 77
Discharge: HOME | End: 2022-10-07
Attending: STUDENT IN AN ORGANIZED HEALTH CARE EDUCATION/TRAINING PROGRAM
Payer: MEDICARE

## 2022-10-07 ENCOUNTER — HOSPITAL ENCOUNTER (OUTPATIENT)
Dept: ULTRASOUND IMAGING | Age: 77
Discharge: HOME | End: 2022-10-07
Attending: STUDENT IN AN ORGANIZED HEALTH CARE EDUCATION/TRAINING PROGRAM
Payer: MEDICARE

## 2022-10-07 DIAGNOSIS — N63.0 BREAST MASS: ICD-10-CM

## 2022-10-07 DIAGNOSIS — N64.4 PAIN IN BREAST: ICD-10-CM

## 2022-10-07 DIAGNOSIS — N64.4 BREAST PAIN: ICD-10-CM

## 2022-10-07 PROCEDURE — 77066 DX MAMMO INCL CAD BI: CPT

## 2022-10-07 PROCEDURE — 76642 ULTRASOUND BREAST LIMITED: CPT

## 2022-10-10 ENCOUNTER — HOSPITAL ENCOUNTER (OUTPATIENT)
Dept: PHYSICAL THERAPY | Age: 77
Discharge: HOME | End: 2022-10-10
Payer: MEDICARE

## 2022-10-10 PROCEDURE — 97140 MANUAL THERAPY 1/> REGIONS: CPT

## 2022-10-10 PROCEDURE — 97110 THERAPEUTIC EXERCISES: CPT

## 2022-10-10 NOTE — PROGRESS NOTES
PT DAILY TREATMENT NOTE     Patient Name: Randall Acharya  Date:10/10/2022  : 1945  [x]  Patient  Verified  Payor: Sarahi Stands / Plan: [place] [employer] [place] / Product Type: Managed Care Medicare /    In time:1:30  Out time:2:13  Total Treatment Time (min): 43  Visit #: 5 of 10    Medicare/BCBS Only   Total Timed Codes (min):  43 1:1 Treatment Time:  43       Treatment Area: Pain in right shoulder [M25.511]  Closed fracture of head of right humerus, initial encounter [S42.291A]    SUBJECTIVE  Pain Level (0-10 scale): 1  Any medication changes, allergies to medications, adverse drug reactions, diagnosis change, or new procedure performed?: [x] No    [] Yes (see summary sheet for update)  Subjective functional status/changes:   [] No changes reported  I was able to reach into a cabinet, but didn't trust myself to grab and lower cup    OBJECTIVE      33 min Therapeutic Exercise:  [] See flow sheet :   Rationale: increase ROM and increase strength to improve the patients ability to more easily perform reaching, ADL's     10 min Manual Therapy:  STM to right lat insertion, sub scap, serratus anterior. Manual stretching to increase flexion and scaption   The manual therapy interventions were performed at a separate and distinct time from the therapeutic activities interventions. Rationale: decrease pain, increase ROM, and increase tissue extensibility to improve ROM, ease of reach, ADL's          With   [] TE   [] TA   [] neuro   [] other: Patient Education: [x] Review HEP    [] Progressed/Changed HEP based on:   [] positioning   [] body mechanics   [] transfers   [] heat/ice application    [] other:      Other Objective/Functional Measures: added 1# weight to SL ER     Pain Level (0-10 scale) post treatment: 0    ASSESSMENT/Changes in Function: pt seen today to provide skilled therapy for strength and ROM in right shoulder.  Limited AROM abduction greater than flexion with c/o pulling axilla. Manual  interventions to lat insertion, sub scap and serratus anterior  Decrease c/o tightness after session     Patient will continue to benefit from skilled PT services to modify and progress therapeutic interventions, address functional mobility deficits, address ROM deficits, address strength deficits, analyze and address soft tissue restrictions, analyze and cue movement patterns, analyze and modify body mechanics/ergonomics, assess and modify postural abnormalities, address imbalance/dizziness, and instruct in home and community integration to attain remaining goals. []  See Plan of Care  []  See progress note/recertification  []  See Discharge Summary         Progress towards goals / Updated goals:  Goal: Pt to increase right shoulder A/AAROM in Flex, ABD to at least 120 deg without increased pain for ease with grooming and overhead reaching. Status at last note/certification: 18: AAROM Right shoulder flexion: 82 deg. Current: progressing - 22: PROM Right shoulder flexion: 100 deg (p!) abduction: 70 deg   After pec release: abduction/scaption: 90 deg  2. Goal: Pt to increase right shoulder ER ROM in scapular plane to 45 deg without increased pain for ease of reaching, ADLs when cleared by MD.  Status at last note/certification: pre manual: PROM: ER: 25 deg, post manual: ER: 28 deg; progressing  Current:  3. Goal: Pt to report < 5/10 pain at worst to increase ease with ADLs. Status at last note/certification:  Pain at worst in the last week: 6/10; on average: 6-7/10   Current: not met - still 6-7/10 pain at worst (22)  4. Goal: Pt to report FOTO score of 52 pts to show improved function and quality of life.   Status at last note/certification:Progressin  Current: reassess at MD note (22)    PLAN  [x]  Upgrade activities as tolerated     []  Continue plan of care  []  Update interventions per flow sheet       []  Discharge due to:_  []  Other:_      Estephania Garner PTA 10/10/2022  1:36 PM    Future Appointments   Date Time Provider Diana Burton   10/13/2022  1:30 PM Domitila Andre [facility], [facility]   10/17/2022  1:30 PM Jes Domitila Area, [facility]   10/20/2022  1:30 PM Germán Beavers, [facility]   10/24/2022  1:30 PM Jes Domitila Area, [facility]   10/27/2022  1:30 PM Jes Domitila [facility], [facility]

## 2022-10-13 ENCOUNTER — APPOINTMENT (OUTPATIENT)
Dept: PHYSICAL THERAPY | Age: 77
End: 2022-10-13
Payer: MEDICARE

## 2022-10-14 ENCOUNTER — HOSPITAL ENCOUNTER (OUTPATIENT)
Dept: PHYSICAL THERAPY | Age: 77
Discharge: HOME | End: 2022-10-14
Payer: MEDICARE

## 2022-10-14 PROCEDURE — 97110 THERAPEUTIC EXERCISES: CPT

## 2022-10-14 PROCEDURE — 97140 MANUAL THERAPY 1/> REGIONS: CPT

## 2022-10-14 PROCEDURE — 97112 NEUROMUSCULAR REEDUCATION: CPT

## 2022-10-14 NOTE — PROGRESS NOTES
PT DAILY TREATMENT NOTE     Patient Name: Ian Shah  Date:10/14/2022  : 1945  [x]  Patient  Verified  Payor: Jordin Meyer / Plan: [place] [employer]AGE / Product Type: Managed Care Medicare /    In time:2:05  Out time:2:45  Total Treatment Time (min): 40  Visit #: 6 of 10    Medicare/BCBS Only   Total Timed Codes (min):  40 1:1 Treatment Time:  40       Treatment Area: Pain in right shoulder [M25.511]  Closed fracture of head of right humerus, initial encounter [S42.291A]    SUBJECTIVE  Pain Level (0-10 scale): 1  Any medication changes, allergies to medications, adverse drug reactions, diagnosis change, or new procedure performed?: [x] No    [] Yes (see summary sheet for update)  Subjective functional status/changes:   [] No changes reported  I really don't have much pain  Some soreness under my arm but much improved    OBJECTIVE      20 min Therapeutic Exercise:  [] See flow sheet :   Rationale: increase ROM and increase strength to improve the patients ability to more easily perform daily tasks,  grooming     10 min Neuromuscular Re-education:  []  See flow sheet :   Rationale: increase strength and improve coordination  to improve the patients ability to increase shoulder and scapular stability    10 min Manual Therapy:  STM to right deltoid, teres major/minor. Manual stretching into flexion and scaption   The manual therapy interventions were performed at a separate and distinct time from the therapeutic activities interventions.   Rationale: increase ROM and increase tissue extensibility to improve shoulder mobility for ADL's, functional tasks, reaching          With   [] TE   [] TA   [] neuro   [] other: Patient Education: [x] Review HEP    [] Progressed/Changed HEP based on:   [] positioning   [] body mechanics   [] transfers   [] heat/ice application    [] other:      Other Objective/Functional Measures: wall clocks with YTB     Pain Level (0-10 scale) post treatment: 0    ASSESSMENT/Changes in Function: pt seen today to address ROM and strength for functional tasks. Mild discomfort along lateral ribs/axilla with palpable tenderness in serratus anterior. Progressing toward goals, with minimal to no pain. Patient will continue to benefit from skilled PT services to modify and progress therapeutic interventions, address functional mobility deficits, address ROM deficits, address strength deficits, analyze and address soft tissue restrictions, analyze and cue movement patterns, analyze and modify body mechanics/ergonomics, assess and modify postural abnormalities, address imbalance/dizziness, and instruct in home and community integration to attain remaining goals. []  See Plan of Care  []  See progress note/recertification  []  See Discharge Summary         Progress towards goals / Updated goals:  Goal: Pt to increase right shoulder A/AAROM in Flex, ABD to at least 120 deg without increased pain for ease with grooming and overhead reaching. Status at last note/certification: 0/5/10: AAROM Right shoulder flexion: 82 deg. Current: progressing - 22: PROM Right shoulder flexion: 100 deg (p!) abduction: 70 deg   After pec release: abduction/scaption: 90 deg  2. Goal: Pt to increase right shoulder ER ROM in scapular plane to 45 deg without increased pain for ease of reaching, ADLs when cleared by MD.  Status at last note/certification: pre manual: PROM: ER: 25 deg, post manual: ER: 28 deg; progressing  Current:  3. Goal: Pt to report < 5/10 pain at worst to increase ease with ADLs. Status at last note/certification:  Pain at worst in the last week: 6/10; on average: 6-7/10   Current: not met - still 6-7/10 pain at worst (22)  4. Goal: Pt to report FOTO score of 52 pts to show improved function and quality of life.   Status at last note/certification:Progressin  Current: reassess at MD note (22)    PLAN  []  Upgrade activities as tolerated     []  Continue plan of care  []  Update interventions per flow sheet       []  Discharge due to:_  []  Other:_      Fazal Alberts, TEE 10/14/2022  2:22 PM    Future Appointments   Date Time Provider Diana Burton   10/17/2022  1:30 PM Maura Andre, PT [facility]   10/20/2022  1:30 PM Roel Ferreira, PT [facility]   10/24/2022  1:30 PM Maura Andre, PT [facility]   10/27/2022  1:30 PM Maura Andre, PT [facility]

## 2022-10-17 ENCOUNTER — HOSPITAL ENCOUNTER (OUTPATIENT)
Dept: PHYSICAL THERAPY | Age: 77
Discharge: HOME | End: 2022-10-17
Payer: MEDICARE

## 2022-10-17 PROCEDURE — 97110 THERAPEUTIC EXERCISES: CPT

## 2022-10-17 PROCEDURE — 97530 THERAPEUTIC ACTIVITIES: CPT

## 2022-10-17 NOTE — PROGRESS NOTES
[facility] - [facility], [address]  [phone] fax    Continued Plan of Care/ Re-certification for Physical Therapy Services      Patient name: Teo Schmidt Start of Care: 22   Referral source: Roxana Yip, [address] : 1945   Medical/Treatment Diagnosis: Pain in right shoulder [M25.511]  Closed fracture of head of right humerus, initial encounter [R29.358B]  Payor: Layoscar Likes / Plan: [employer] / Product Type: Managed Care Medicare /  Onset Date:22(surgery)      Prior Hospitalization: see medical history Provider#: [id number]   Medications: Verified on Patient Summary List     Comorbidities:  Arthritis; Back pain; Headaches; Thyroid problems  Prior Level of Function:Retired; lives in studio apartment alone; manages home independently; walking program for exercise, completes stained glass projects    Visits from Start of Care: 29    Missed Visits: 3    The Plan of Care and following information is based on the patient's current status:  Goal: Pt to increase right shoulder A/AAROM in Flex, ABD to at least 120 deg without increased pain for ease with grooming and overhead reaching. Status at last note/certification: Progressing: right shoulder flex: 108 deg. Abd: 75 deg: (9/15/22)   Current: not met - Flex 100 deg, ABD 77 deg (10/17/22)     Goal: Pt to increase right shoulder ER ROM in scapular plane to 45 deg without increased pain for ease of reaching, ADLs when cleared by MD.  Status at last note/certification: Progressing: ER: 40 deg (9/15/22)  Current: progressing - ER 43 deg in scapular plane (10/17/22)     Goal: Pt to report FOTO score of 52 pts to show improved function and quality of life. Status at last note/certification:Progressing: FOTO 41 (9/15/22)   Current: met - FOTO 56 pts (10/17/22)     Goal: Pt to place a 5lb weight on a shelf overhead for improved tolerance of household chores. Status at last note/certification:Unable to perform  Current: not met - able to place an item of 1# wt on shelf above shoulder height (10/17/22)    Key functional changes: Pt has attended skilled therapy to address right shoulder pain, limited mobility, and strength S/P reverse TSA on 5/20/22. Pt's functional gains include being able to get  out of closet, grabbing and using toothbrush, cups, glasses with right hand, writing better with right hand, is able to don bra, is able to reach more behind back although still limited, is able to use spatula, vacuum, can use right hand to manipulate gear shift in car, is able to don seatbelt but requires some assistance to lock in place, is able to rest on right shoulder more now, grocery shop and carry items down at hand level. Pt's functional deficits include inability to don pullover shirt/sweater, lifting a weighted object and bringing it down from a shelf is difficult, still challenged with using curling iron, is unable to do stained glass projects. Pt gives herself an 80% improvement rating in function. Pt would benefit from continued skilled therapy to further improve right UE ROM, strength, and give updated HEP to ensure readiness for discharge and ability to manage care independently after discharge. Problems/ barriers to goal attainment: None     Problem List: pain affecting function, decrease ROM, decrease strength, edema affecting function, decrease ADL/ functional abilitiies, decrease activity tolerance, decrease flexibility/ joint mobility, and decrease transfer abilities    Treatment Plan: Therapeutic exercise, Neuromuscular reeducation, Manual therapy, Therapeutic activity, Self care/home management, and Electric stim unattended      Patient Goal (s) has been updated and includes: \"To keep getting more motion and strength. \"     Goals for this certification period to be accomplished in 2 weeks:  - Remaining unmet goals above.     Frequency / Duration: Patient to be seen 2 times per week for 2 weeks:    Assessment / Recommendations: Pt would benefit from continued skilled therapy to further improve right UE ROM, strength, and give updated HEP to ensure readiness for discharge and ability to manage care independently after discharge. Certification Period: 10/14/22 - 11/12/22    Sonali Andre, PT 10/17/2022 2:22 PM    ________________________________________________________________________  I certify that the above Therapy Services are being furnished while the patient is under my care. I agree with the treatment plan and certify that this therapy is necessary. [] I have read the above and request that my patient continue as recommended.   [] I have read the above report and request that my patient continue therapy with the following changes/special instructions: ______________________________________  [] I have read the above report and request that my patient be discharged from therapy    Physician's Signature:____________Date:_________TIME:________     MAICO Murillo  ** Signature, Date and Time must be completed for valid certification **    Please sign and return to [facility] - [address]  [phone] fax

## 2022-10-17 NOTE — PROGRESS NOTES
PT DAILY TREATMENT NOTE     Patient Name: Payton Dowling  Date:10/17/2022  : 1945  [x]  Patient  Verified  Payor: Shirley Aguilera / Plan: [facility] MEDICARE ADVANTAGE / Product Type: Managed Care Medicare /    In time:1:32  Out time:2:18  Total Treatment Time (min): 55  Visit #: 2 of 4    Medicare/BCBS Only   Total Timed Codes (min):  46 1:1 Treatment Time: 46        Treatment Area: Pain in right shoulder [M25.511]  Closed fracture of head of right humerus, initial encounter [S42.291A]    SUBJECTIVE  Pain Level (0-10 scale): 1/10  Any medication changes, allergies to medications, adverse drug reactions, diagnosis change, or new procedure performed?: [x] No    [] Yes (see summary sheet for update)  Subjective functional status/changes:   [] No changes reported  \"I am overall much better than I was. I know I won't be 100% but I the arm is much looser and the pain is better. \"    OBJECTIVE    28 min Therapeutic Exercise:  [] See flow sheet :   Rationale: increase ROM and increase strength to improve the patients ability to more easily perform daily tasks,  grooming     18 min Therapeutic Activity:  []  See flow sheet :   Rationale: increase strength and improve coordination  to improve the patients ability to raise arm overhead, lift and carry items, perform ADLs    With   [] TE   [] TA   [] neuro   [] other: Patient Education: [x] Review HEP    [] Progressed/Changed HEP based on:   [] positioning   [] body mechanics   [] transfers   [] heat/ice application    [] other:      Other Objective/Functional Measures: Performed exercises per flow sheet. Reassessed goals for retro re-certification note. Pain Level (0-10 scale) post treatment: 0/10    ASSESSMENT/Changes in Function: Pt has attended skilled therapy to address right shoulder pain, limited mobility, and strength S/P reverse TSA on 22.   Pt's functional gains include being able to get  out of closet, grabbing and using toothbrush, cups, glasses with right hand, writing better with right hand, is able to don bra, is able to reach more behind back although still limited, is able to use spatula, vacuum, can use right hand to manipulate gear shift in car, is able to don seatbelt but requires some assistance to lock in place, is able to rest on right shoulder more now, grocery shop and carry items down at hand level. Pt's functional deficits include inability to don pullover shirt/sweater, lifting a weighted object and bringing it down from a shelf is difficult, still challenged with using curling iron, is unable to do stained glass projects. Pt gives herself an 80% improvement rating in function. Pt would benefit from continued skilled therapy to further improve right UE ROM, strength, and give updated HEP to ensure readiness for discharge and ability to manage care independently after discharge. Patient will continue to benefit from skilled PT services to modify and progress therapeutic interventions, address functional mobility deficits, address ROM deficits, address strength deficits, analyze and address soft tissue restrictions, analyze and cue movement patterns, analyze and modify body mechanics/ergonomics, assess and modify postural abnormalities, address imbalance/dizziness, and instruct in home and community integration to attain remaining goals. []  See Plan of Care  [x]  See progress note/recertification  []  See Discharge Summary         Progress towards goals / Updated goals:  Goal: Pt to increase right shoulder A/AAROM in Flex, ABD to at least 120 deg without increased pain for ease with grooming and overhead reaching. Status at last note/certification: Progressing: right shoulder flex: 108 deg.  Abd: 75 deg: (9/15/22)   Current: not met - Flex 100 deg, ABD 77 deg (10/17/22)     Goal: Pt to increase right shoulder ER ROM in scapular plane to 45 deg without increased pain for ease of reaching, ADLs when cleared by MD.  Status at last note/certification: Progressing: ER: 40 deg (9/15/22)  Current: progressing - ER 43 deg in scapular plane (10/17/22)     Goal: Pt to report FOTO score of 52 pts to show improved function and quality of life. Status at last note/certification:Progressing: FOTO 41 (9/15/22)   Current: met - FOTO 56 pts (10/17/22)     Goal: Pt to place a 5lb weight on a shelf overhead for improved tolerance of household chores.    Status at last note/certification:Unable to perform  Current: not met - able to place an item of 1# wt on shelf above shoulder height (10/17/22)    PLAN  [x]  Upgrade activities as tolerated     [x]  Continue plan of care  []  Update interventions per flow sheet       []  Discharge due to:_  []  Other:_      Johnny Andre, PT 10/17/2022  2:22 PM    Future Appointments   Date Time Provider Diana Burton   10/20/2022  1:30 PM Josse Barnes, PT [facility]   10/24/2022  1:30 PM Johnny Andre, [facility]   10/27/2022  1:30 PM Johnny Andre, [facility]

## 2022-10-20 ENCOUNTER — HOSPITAL ENCOUNTER (OUTPATIENT)
Dept: PHYSICAL THERAPY | Age: 77
Discharge: HOME | End: 2022-10-20
Payer: MEDICARE

## 2022-10-20 PROCEDURE — 97110 THERAPEUTIC EXERCISES: CPT

## 2022-10-20 PROCEDURE — 97112 NEUROMUSCULAR REEDUCATION: CPT

## 2022-10-20 NOTE — PROGRESS NOTES
Physical Therapy Discharge Instructions      [facility] - [facility], [address] [place] [address]  [phone] fax    Patient: Oliver Rogers  : 1945      Continue Home Exercise Program daily. Continue with    [x] Ice  as needed      [x] Heat       10-20 minutes with appropriate layering and perform skin check afterwards. Follow up with MD:     [] Upon completion of therapy     [x] As needed      Recommendations:     [x]   Return to activity with home exercise program        Additional Comments: it was great working with you! Please call if you have any questions.            Jocelyn Loredo, PT 10/20/2022 1:48 PM

## 2022-10-20 NOTE — PROGRESS NOTES
PT DISCHARGE DAILY NOTE AND [id number]    Patient name: Payton Dowling Start of Care:  22   Referral source: Sherice Kidd : 1945   Medical/Treatment Diagnosis: Pain in right shoulder [M25.511]  Closed fracture of head of right humerus, initial encounter [S42.291A] Onset Date:22(surgery)     Prior Hospitalization: see medical history Provider#: [id number]   Medications: Verified on Patient Summary List    Comorbidities:  Arthritis; Back pain; Headaches;  Thyroid problems  Prior Level of Function:Retired; lives in studio apartment alone; manages home independently; walking program for exercise, completes stained glass projects    Visits from Start of Care: 30    Missed Visits: 3    Reporting Period : 10/17/22 to 10/20/22     Date:10/20/2022  : 1945  [x]  Patient  Verified  Payor: Shirley Aguilera / Plan: Edgardo Squires / Product Type: Managed Care Medicare /    In time:1335 pm  Out time:1414 pm  Total Treatment Time (min): 39  Visit #: 1 of 4    Medicare/BCBS Only   Total Timed Codes (min):  39 1:1 Treatment Time:  39       SUBJECTIVE  Pain Level (0-10 scale): 1  Any medication changes, allergies to medications, adverse drug reactions, diagnosis change, or new procedure performed?: [x] No    [] Yes (see summary sheet for update)  Subjective functional status/changes:   [] No changes reported  \"Mild discomfort\"    OBJECTIVE      29 min Therapeutic Exercise:  [x] See flow sheet :   Rationale: increase ROM and increase strength to improve the patients ability to perform ADL's, grooming     10 min Neuromuscular Re-education:  [x]  See flow sheet :   Rationale: increase ROM, increase strength, and improve coordination  to improve the patients ability to perform ADL's with improved GHJ and scapular control           With   [x] TE   [] TA   [x] neuro   [] other: Patient Education: [x] Review HEP    [] Progressed/Changed HEP based on:   [] positioning   [] body mechanics [] transfers   [] heat/ice application    [x] other: d/c instructions, safe performance of therex, issued orange band for door therex. Other Objective/Functional Measures:   Pain at worst in the last week: 2/10; on average: 1/10  Subjective % improvement since start of care: 75-80%  Functional improvements: able to fold bed, brushing teeth, improved ability to lay on right shoulder. Functional limitations: \"can't get into pullovers yet\", soreness in underarm. AROM flexion right: 90 deg, abduction: 75 deg; AAROM: 100 deg, abduction: 76 deg  ER in supine at 45 deg abduction: 23 deg; sittin deg     Strength (UE):   Right (/5)   GHJ   Flexion 3+             Extension 4             ER 3+             IR 3+        Pain Level (0-10 scale) post treatment: 1    Summary of Care:  Goal: Pt to increase right shoulder A/AAROM in Flex, ABD to at least 120 deg without increased pain for ease with grooming and overhead reaching. Status at last note/certification:   Flex 100 deg, ABD 77 deg (10/17/22) AROM flexion right: 90 deg, abduction: 75 deg; AAROM: 100 deg, abduction: 76 deg (10/20/22) not met      Goal: Pt to increase right shoulder ER ROM in scapular plane to 45 deg without increased pain for ease of reaching, ADLs when cleared by MD.  Status at last note/certification: progressing - ER 43 deg in scapular plane (10/17/22) ER in supine at 45 deg abduction: 23 deg; sittin deg (10/20/22) not met    ASSESSMENT/Changes in Function:   Pt attended therapy consistently for 30 visits including initial evaluation for the treatment of Pain in right shoulder [M25.511]  Closed fracture of head of right humerus, initial encounter Lindsey Pritchett . At this point, the patient reports 75-80% improvement since [facility] with specific improvements in the following areas: ability to use toothbrush, ease with using right arm for cooking. Continued limitations include inability reaching overhead, not doing stained glass projects. Continued right shoulder decreased strength. The patient has 1demonstrated improvement in shoulder FOTO score from 4 pts at initial evaluation to 56 pts on 10/17/22, demonstrating improved function in the home and community. Pt reports independence with HEP provided. The patient is appropriate for discharge at this time with a comprehensive HEP and will follow up with our office about any questions that arise following discharge.      Thank you for this referral.       PLAN  [x]Discontinue therapy: [x]Patient is progressing toward set goals      []Patient is non-compliant or has abdicated      []Due to lack of appreciable progress towards set goals    Zora Abdul, PT 10/20/2022 2:32 PM     [] I have read the above report and request that my patient continue therapy with the following changes/special instructions:  [] I have read the above report and request that my patient be discharged from therapy    Physician's Signature:____________Date:_________TIME:________    MAICO Jo     ** Signature, Date and Time must be completed for valid certification **

## 2022-10-24 ENCOUNTER — APPOINTMENT (OUTPATIENT)
Dept: PHYSICAL THERAPY | Age: 77
End: 2022-10-24
Payer: MEDICARE

## 2022-10-27 ENCOUNTER — APPOINTMENT (OUTPATIENT)
Dept: PHYSICAL THERAPY | Age: 77
End: 2022-10-27
Payer: MEDICARE

## 2023-03-22 ENCOUNTER — TRANSCRIBE ORDERS (OUTPATIENT)
Facility: HOSPITAL | Age: 78
End: 2023-03-22

## 2023-03-22 DIAGNOSIS — M85.80 OSTEOPENIA, UNSPECIFIED LOCATION: ICD-10-CM

## 2023-03-22 DIAGNOSIS — Z78.0 ASYMPTOMATIC MENOPAUSAL STATE: Primary | ICD-10-CM

## 2023-03-28 ENCOUNTER — HOSPITAL ENCOUNTER (OUTPATIENT)
Facility: HOSPITAL | Age: 78
Discharge: HOME | End: 2023-03-31
Payer: MEDICARE

## 2023-03-28 ENCOUNTER — APPOINTMENT (OUTPATIENT)
Facility: HOSPITAL | Age: 78
End: 2023-03-28
Payer: MEDICARE

## 2023-03-28 DIAGNOSIS — Z78.0 ASYMPTOMATIC MENOPAUSAL STATE: ICD-10-CM

## 2023-03-28 DIAGNOSIS — M85.80 OSTEOPENIA, UNSPECIFIED LOCATION: ICD-10-CM

## 2023-03-28 PROCEDURE — 77080 DXA BONE DENSITY AXIAL: CPT

## 2023-04-21 ENCOUNTER — HOSPITAL ENCOUNTER (OUTPATIENT)
Facility: HOSPITAL | Age: 78
End: 2023-04-21
Payer: MEDICARE

## 2023-04-21 ENCOUNTER — HOSPITAL ENCOUNTER (OUTPATIENT)
Facility: HOSPITAL | Age: 78
Discharge: HOME | End: 2023-04-21
Payer: MEDICARE

## 2023-04-21 DIAGNOSIS — R92.8 ABNORMAL MAMMOGRAM: ICD-10-CM

## 2023-04-21 PROCEDURE — 76642 ULTRASOUND BREAST LIMITED: CPT

## 2024-07-15 NOTE — PROGRESS NOTES
[facility] - Lenore Packer [address]  [phone] fax  Plan of Care/ Statement of Necessity for Physical Therapy Services    Patient name: Amira Horton Start of Care: 2022   Referral source: Vickie JacksonSallyma : 1945    Medical Diagnosis: Pain in right shoulder [M25.511]  Closed fracture of head of right humerus, initial encounter Zahira Espinosa  Payor: Leonardo Mora / Plan: [employer] / Product Type: Managed Care Medicare /  Onset Date:22 (surgery)    Treatment Diagnosis: Right Shoulder Pain   Prior Hospitalization: see medical history Provider#: [id number]   Medications: Verified on Patient summary List    Comorbidities: Arthritis; Back pain; Headaches; Thyroid problems   Prior Level of Function: Retired; lives in studio apartment alone; manages home independently; walking program for exercise, completes stained glass projects      The Plan of Care and following information is based on the information from the initial evaluation. Assessment/ key information: Pt is a 68 y.o. female who presents with c/o right shoulder pain, impaired right shoulder, elbow, wrist mobility, impaired right shoulder, elbow strength, S/P right reverse TSA on 22. Functional deficits include: inability to cook, clean and maintain home, disrupted sleep in alternate positions, not performing walking program for exercise, unable to complete stain glass projects, and inability to drive. Pt educated on post-op rehab protocol and precautions and given comprehensive, phase I appropriate HEP with good understanding. Pt would benefit from skilled PT to address above deficits to improve Pt's functional mobility and independence in accordance with MD protocol for ability to return to full function and better quality of life.     Evaluation Complexity History MEDIUM  Complexity : 1-2 comorbidities / personal factors will impact the outcome/ POC ; Examination MEDIUM Complexity : 3 Standardized tests and measures addressing body structure, function, activity limitation and / or participation in recreation  ;Presentation MEDIUM Complexity : Evolving with changing characteristics  ; Clinical Decision Making HIGH Complexity : FOTO score of 1- 25   Overall Complexity Rating: MEDIUM  Problem List: pain affecting function, decrease ROM, decrease strength, edema affecting function, decrease ADL/ functional abilitiies, decrease activity tolerance, decrease flexibility/ joint mobility and decrease transfer abilities   Treatment Plan may include any combination of the following: Therapeutic exercise, Therapeutic activities, Neuromuscular re-education, Physical agent/modality, Manual therapy, Patient education, Self Care training and Functional mobility training  Patient / Family readiness to learn indicated by: asking questions and interest  Persons(s) to be included in education: patient (P) and family support person (FSP);list brother  Barriers to Learning/Limitations: None  Patient Goal (s): Be able to drive, get back to normal, independent living.   Patient Self Reported Health Status: good  Rehabilitation Potential: good    Short Term Goals: To be accomplished in 1 weeks:  Goal: Pt to be compliant with initial HEP to improve cervical, right shoulder, elbow, wrist mobility  Status at last note/certification: Established and reviewed with Pt  Long Term Goals: To be accomplished in 5 weeks:  Goal: Pt to increase right shoulder ROM in Flex, ABD to at least 90 deg without increased pain to progress towards AAROM activities.   Status at last note/certification: PROM in supine - Flex 35 deg, ABD 38 deg  Goal: Pt to increase right shoulder ER ROM in scapular plane to 45 deg without increased pain for ease of reaching, ADLs when cleared by MD.  Status at last note/certification: supine ER in scapular plane - 0 deg PROM - protocol restriction  Goal: Pt to report no difficulty with washing same side of face for ease of bathing, grooming tasks. Status at last note/certification: unable to perform  Goal: Pt to report < 5/10 pain at worst to increase ease with ADLs. Status at last note/certification: 62/51 pain at worst  Goal: Pt to report FOTO score of 52 pts to show improved function and quality of life. Status at last note/certification: FOTO 4 pts     Frequency / Duration: Patient to be seen 2 times per week for 5 weeks. Patient/ Caregiver education and instruction: Diagnosis, prognosis, exercises   [x]  Plan of care has been reviewed with PTA    Certification Period: 5/25/22 - 6/23/22  Sonali Andre, PT 5/25/2022 11:01 AM  _____________________________________________________________________  I certify that the above Therapy Services are being furnished while the patient is under my care. I agree with the treatment plan and certify that this therapy is necessary.     [de-identified] Signature:____________Date:_________TIME:________     MAICO Nair  ** Signature, Date and Time must be completed for valid certification **    Please sign and return to [facility] [address]  [phone] fax IOL term

## (undated) DEVICE — NEEDLE SUT SZ 2 S STL MAYO CATGUT 1/2 CIR TAPR PT

## (undated) DEVICE — PACK PROCEDURE SURG TOT HIP BSHR LF

## (undated) DEVICE — BANDAGE,GAUZE,BULKEE II,4.5"X4.1YD,STRL: Brand: MEDLINE

## (undated) DEVICE — 3M™ MEDIPORE™ H SOFT CLOTH SURGICAL TAPE 2862, 2 INCH X 10 YARD (5CM X 9,1M), 12 ROLLS/CASE: Brand: 3M™ MEDIPORE™

## (undated) DEVICE — Device

## (undated) DEVICE — SUTURE VCRL SZ 3-0 L27IN ABSRB UD L36MM CT-1 1/2 CIR J258H

## (undated) DEVICE — BRACE SHLDR M FA L135 145IN UNIV AIRMESH BRTH SLNG 15DEG

## (undated) DEVICE — SUTURE VCRL SZ 2-0 L27IN ABSRB VLT L36MM CT-1 1/2 CIR J339H

## (undated) DEVICE — ARTHREX SUCTION COBB

## (undated) DEVICE — STERILE POLYISOPRENE POWDER-FREE SURGICAL GLOVES: Brand: PROTEXIS

## (undated) DEVICE — 2108 SERIES SAGITTAL BLADE (24.8 X 1.24 X 80.1MM)

## (undated) DEVICE — HANDPIECE SET WITH HIGH FLOW TIP AND SUCTION TUBE: Brand: INTERPULSE

## (undated) DEVICE — SUTURE FIBERWIRE SZ 2 W/ TAPERED NEEDLE BLUE L38IN NONABSORB BLU L26.5MM 1/2 CIRCLE AR7200

## (undated) DEVICE — SPONGE LAP 18X18IN STRL -- 5/PK

## (undated) DEVICE — NEEDLE SUT PASS FOR ROT CUF LABRAL REP MULTFI SCORPION

## (undated) DEVICE — APPLICATOR BNDG 1MM ADH PREMIERPRO EXOFIN

## (undated) DEVICE — BLANKET WRM W40.2XL55.9IN IORT LO BODY + MISTRAL AIR

## (undated) DEVICE — NEEDLE SPNL 22GA L3.5IN BLK HUB S STL REG WALL FIT STYL W/

## (undated) DEVICE — DRAIN SURG L0.75IN TRCR

## (undated) DEVICE — PREP SKN CHLRAPRP APL 26ML STR --

## (undated) DEVICE — SET PIN MOD GLEN SYS

## (undated) DEVICE — DEPRESSOR TNG L6IN NONSTERILE WOOD SMOOTH SPLNT FREE

## (undated) DEVICE — KIT CLN UP BON SECOURS MARYV

## (undated) DEVICE — PAD,ABDOMINAL,8"X10",ST,LF: Brand: MEDLINE

## (undated) DEVICE — GAUZE,SPONGE,4"X4",16PLY,STRL,LF,10/TRAY: Brand: MEDLINE

## (undated) DEVICE — BLANKET WRM AD W50XL85.8IN PACU FULL BODY FORC AIR

## (undated) DEVICE — GARMENT,MEDLINE,DVT,INT,CALF,MED, GEN2: Brand: MEDLINE

## (undated) DEVICE — INTENDED FOR TISSUE SEPARATION, AND OTHER PROCEDURES THAT REQUIRE A SHARP SURGICAL BLADE TO PUNCTURE OR CUT.: Brand: BARD-PARKER ®  SAFETY SCALPED

## (undated) DEVICE — PAD,NON-ADHERENT,3X8,STERILE,LF,1/PK: Brand: MEDLINE

## (undated) DEVICE — SUTURE VCRL SZ 0 L27IN ABSRB UD L36MM CT-1 1/2 CIR J260H

## (undated) DEVICE — KIT SUT SZ 2 L38IN FIBERWIRE W/ TAPR NDL STR NIT LOOP MIC

## (undated) DEVICE — 4-PORT MANIFOLD: Brand: NEPTUNE 2

## (undated) DEVICE — SOLUTION IRRIG 3000ML 0.9% SOD CHL FLX CONT 0797208] ICU MEDICAL INC]

## (undated) DEVICE — DRESSING FOAM W6XL6.75IN SHAL GRANULATING WND SKIN TEAR

## (undated) DEVICE — SOLUTION IV 1000ML 0.9% SOD CHL